# Patient Record
Sex: FEMALE | Race: BLACK OR AFRICAN AMERICAN | Employment: OTHER | URBAN - METROPOLITAN AREA
[De-identification: names, ages, dates, MRNs, and addresses within clinical notes are randomized per-mention and may not be internally consistent; named-entity substitution may affect disease eponyms.]

---

## 2017-06-05 ENCOUNTER — HOSPITAL ENCOUNTER (OUTPATIENT)
Dept: LAB | Age: 77
Discharge: HOME | End: 2017-06-05

## 2017-06-05 ENCOUNTER — OFFICE VISIT (OUTPATIENT)
Dept: FAMILY MEDICINE CLINIC | Age: 77
End: 2017-06-05

## 2017-06-05 VITALS
WEIGHT: 155.8 LBS | OXYGEN SATURATION: 99 % | DIASTOLIC BLOOD PRESSURE: 71 MMHG | SYSTOLIC BLOOD PRESSURE: 143 MMHG | TEMPERATURE: 98.1 F | BODY MASS INDEX: 28.5 KG/M2 | HEART RATE: 73 BPM

## 2017-06-05 DIAGNOSIS — E11.9 TYPE 2 DIABETES MELLITUS WITHOUT COMPLICATION, WITHOUT LONG-TERM CURRENT USE OF INSULIN (HCC): ICD-10-CM

## 2017-06-05 DIAGNOSIS — E03.9 UNSPECIFIED HYPOTHYROIDISM: ICD-10-CM

## 2017-06-05 DIAGNOSIS — Z13.1 SCREENING FOR DIABETES MELLITUS (DM): ICD-10-CM

## 2017-06-05 DIAGNOSIS — I10 ESSENTIAL HYPERTENSION: Primary | ICD-10-CM

## 2017-06-05 DIAGNOSIS — J45.20 MILD INTERMITTENT ASTHMA WITHOUT COMPLICATION: ICD-10-CM

## 2017-06-05 LAB
ALBUMIN SERPL BCP-MCNC: 4.2 G/DL (ref 3.5–5)
ALBUMIN/GLOB SERPL: 1.1 {RATIO} (ref 1.1–2.2)
ALP SERPL-CCNC: 87 U/L (ref 45–117)
ALT SERPL-CCNC: 23 U/L (ref 12–78)
ANION GAP BLD CALC-SCNC: 9 MMOL/L (ref 5–15)
AST SERPL W P-5'-P-CCNC: 21 U/L (ref 15–37)
BILIRUB SERPL-MCNC: 0.6 MG/DL (ref 0.2–1)
BUN SERPL-MCNC: 11 MG/DL (ref 6–20)
BUN/CREAT SERPL: 11 (ref 12–20)
CALCIUM SERPL-MCNC: 10.5 MG/DL (ref 8.5–10.1)
CHLORIDE SERPL-SCNC: 101 MMOL/L (ref 97–108)
CO2 SERPL-SCNC: 32 MMOL/L (ref 21–32)
CREAT SERPL-MCNC: 1.03 MG/DL (ref 0.55–1.02)
ERYTHROCYTE [DISTWIDTH] IN BLOOD BY AUTOMATED COUNT: 14.4 % (ref 11.5–14.5)
EST. AVERAGE GLUCOSE BLD GHB EST-MCNC: 126 MG/DL
GLOBULIN SER CALC-MCNC: 3.7 G/DL (ref 2–4)
GLUCOSE POC: NORMAL MG/DL
GLUCOSE SERPL-MCNC: 58 MG/DL (ref 65–100)
HBA1C MFR BLD: 6 % (ref 4.2–6.3)
HCT VFR BLD AUTO: 41.5 % (ref 35–47)
HGB BLD-MCNC: 14.3 G/DL (ref 11.5–16)
MCH RBC QN AUTO: 28.7 PG (ref 26–34)
MCHC RBC AUTO-ENTMCNC: 34.5 G/DL (ref 30–36.5)
MCV RBC AUTO: 83.3 FL (ref 80–99)
PLATELET # BLD AUTO: 371 K/UL (ref 150–400)
POTASSIUM SERPL-SCNC: 4 MMOL/L (ref 3.5–5.1)
PROT SERPL-MCNC: 7.9 G/DL (ref 6.4–8.2)
RBC # BLD AUTO: 4.98 M/UL (ref 3.8–5.2)
SODIUM SERPL-SCNC: 142 MMOL/L (ref 136–145)
TSH SERPL DL<=0.05 MIU/L-ACNC: 0.12 UIU/ML (ref 0.36–3.74)
WBC # BLD AUTO: 7.3 K/UL (ref 3.6–11)

## 2017-06-05 PROCEDURE — 80053 COMPREHEN METABOLIC PANEL: CPT | Performed by: NURSE PRACTITIONER

## 2017-06-05 PROCEDURE — 85027 COMPLETE CBC AUTOMATED: CPT | Performed by: NURSE PRACTITIONER

## 2017-06-05 PROCEDURE — 83036 HEMOGLOBIN GLYCOSYLATED A1C: CPT | Performed by: NURSE PRACTITIONER

## 2017-06-05 PROCEDURE — 84443 ASSAY THYROID STIM HORMONE: CPT | Performed by: NURSE PRACTITIONER

## 2017-06-05 RX ORDER — HYDROCHLOROTHIAZIDE 12.5 MG/1
12.5 TABLET ORAL DAILY
Qty: 90 TAB | Refills: 1 | Status: SHIPPED | OUTPATIENT
Start: 2017-06-05 | End: 2017-11-10 | Stop reason: SDUPTHER

## 2017-06-05 RX ORDER — LEVOTHYROXINE SODIUM 88 UG/1
88 TABLET ORAL DAILY
Qty: 30 TAB | Refills: 5 | Status: SHIPPED | OUTPATIENT
Start: 2017-06-05 | End: 2017-06-29 | Stop reason: SDUPTHER

## 2017-06-05 RX ORDER — METFORMIN HYDROCHLORIDE 1000 MG/1
1000 TABLET ORAL 2 TIMES DAILY WITH MEALS
Qty: 180 TAB | Refills: 1 | Status: SHIPPED | OUTPATIENT
Start: 2017-06-05 | End: 2017-11-10 | Stop reason: SDUPTHER

## 2017-06-05 RX ORDER — VALSARTAN 160 MG/1
160 TABLET ORAL DAILY
Qty: 90 TAB | Refills: 1 | Status: SHIPPED | OUTPATIENT
Start: 2017-06-05 | End: 2017-11-10 | Stop reason: SDUPTHER

## 2017-06-05 RX ORDER — AMLODIPINE BESYLATE 10 MG/1
10 TABLET ORAL DAILY
Qty: 90 TAB | Refills: 1 | Status: SHIPPED | OUTPATIENT
Start: 2017-06-05 | End: 2018-01-26 | Stop reason: SDUPTHER

## 2017-06-05 NOTE — PROGRESS NOTES
Subjective:     Chief Complaint   Patient presents with    Joint Pain    Medication Refill        She  is a 67 y.o. female who presents for evaluation of chronic cough and asthma exacerbation. Was evaluated in ED in [facility] and given neb Tx and new Rx for allergy medication. Returned from [place] one week ago and notes her cough has been productive  For the last 3-4 weeks. Was previously dry. C/o of widespread joint pains s/p fall approx 13 years ago. Has widespread knee, ankle and hip pains. Was previously on Exforge HCTZ 160/10/12. 5. Notes infrequent albuterol use, last given in [facility] clinic/ED otherwise MDI was last used 3-4 months ago. Overall feels like cough is getting better. Took malaria prophylaxis while in [place]. No widespread chills/flu/fever S&S. Does not routinely check sugars, has been on Metformin dose x 11 years. Needs refills of her current HTN, DM and thyroid Rx.       ROS  Gen - no fever/chills  Resp - no dyspnea or cough  CV - no chest pain or GILBERT  Rest per HPI    Past Medical History:   Diagnosis Date    Asthma     CAD (coronary artery disease)     high cholestrol    Diabetes ([facility] [address]     Diabetes (HCC)     NIDDM    Hypertension     Other ill-defined conditions     hypothyroidism    Other ill-defined conditions     increased cholesterol    Thyroid disease      Past Surgical History:   Procedure Laterality Date    HX GYN      2 c-sections    HX GYN       x 2    HX OTHER SURGICAL      thyroid removed    HX OTHER SURGICAL      thyroidectomy - partial    [place] EGD INTRMURAL NEEDLE ASPIR/BIOP ALTERED ANATOMY  10/31/2011          Current Outpatient Prescriptions on File Prior to Visit   Medication Sig Dispense Refill    metFORMIN (GLUCOPHAGE) 1,000 mg tablet Take 1,000 mg by mouth two (2) times a day.  rosuvastatin (CRESTOR) 10 mg tablet Take 10 mg by mouth nightly.       HYDROcodone-acetaminophen (NORCO) 5-325 mg per tablet Take 1 Tab by mouth every four (4) hours as needed for Pain. Max Daily Amount: 6 Tabs. 20 Tab 0    amlodipine (NORVASC) 10 mg tablet Take 10 mg by mouth daily.  candesartan-hydrochlorothiazide (ATACAND HCT) 16-12.5 mg per tablet Take 1 Tab by mouth daily.  metformin (GLUCOPHAGE) 1,000 mg tablet Take 1,000 mg by mouth two (2) times daily (with meals).  OTHER Gliclazide per pt for diabetes. Takes 60 mg before breakfast. Will bring medication in to verify. Medication from home of [place].  rosuvastatin (CRESTOR) 10 mg tablet Take 10 mg by mouth daily.  levothyroxine (SYNTHROID) 100 mcg tablet Take 100 mcg by mouth daily.  MULTIVITAMIN W-MINERALS/LUTEIN (CENTRUM SILVER PO) Take  by mouth. Takes one po daily       OTHER Salamol CFC free inhaler as needed. Medication from hometown of [facility].  ALBUTEROL SULFATE (VENTOLIN PO) Take  by mouth as needed.  rosuvastatin (CRESTOR) 5 mg tablet Take 10 mg by mouth daily.  candesartan-hydrochlorothiazide (ATACAND HCT) 16-12.5 mg per tablet Take 1 Tab by mouth daily.  amlodipine (NORVASC) 10 mg tablet Take 10 mg by mouth daily.  metformin (GLUCOPHAGE) 1,000 mg tablet Take 1,000 mg by mouth two (2) times a day. No current facility-administered medications on file prior to visit.          Objective:     Vitals:    06/05/17 1005   BP: 143/71   Pulse: 73   Temp: 98.1 °F (36.7 °C)   TempSrc: Oral   SpO2: 99%   Weight: 155 lb 12.8 oz (70.7 kg)       Physical Examination:  General appearance - alert, well appearing, and in no distress  Eyes -sclera anicteric  Neck - supple, no significant adenopathy, no thyromegaly  Chest - clear to auscultation, no wheezes, rales or rhonchi, symmetric air entry  Heart - normal rate, regular rhythm, normal S1, S2, no murmurs, rubs, clicks or gallops  Neurological - alert, oriented, no focal findings or movement disorder noted  Abdomen-BS present/WNL x 4 quads, non-tender/distended, soft,no organomegaly      Recent Results (from the past 12 hour(s))   AMB POC GLUCOSE BLOOD, BY GLUCOSE MONITORING DEVICE    Collection Time: 06/05/17 10:10 AM   Result Value Ref Range    Glucose POC 93 Non-fasting mg/dL       Assessment/ Plan:   Adis Chavez was seen today for joint pain and medication refill. Diagnoses and all orders for this visit:    Essential hypertension  -     valsartan (DIOVAN) 160 mg tablet; Take 1 Tab by mouth daily. -     hydroCHLOROthiazide (HYDRODIURIL) 12.5 mg tablet; Take 1 Tab by mouth daily. -     amLODIPine (NORVASC) 10 mg tablet; Take 1 Tab by mouth daily. Screening for diabetes mellitus (DM)  -     AMB POC GLUCOSE BLOOD, BY GLUCOSE MONITORING DEVICE    Mild intermittent asthma without complication  -     METABOLIC PANEL, COMPREHENSIVE; Future  -     CBC W/O DIFF; Future  -     XR CHEST PA LAT; Future    Unspecified hypothyroidism  -     TSH 3RD GENERATION; Future  -     levothyroxine (SYNTHROID) 88 mcg tablet; Take 1 Tab by mouth daily. Type 2 diabetes mellitus without complication, without long-term current use of insulin (HCC)  -     HEMOGLOBIN A1C WITH EAG; Future  -     METABOLIC PANEL, COMPREHENSIVE; Future  -     metFORMIN (GLUCOPHAGE) 1,000 mg tablet; Take 1 Tab by mouth two (2) times daily (with meals). Glucose well controlled and Pt was not observed coughing nor w/ SOB. Lungs CTA. Discussed getting CXR in 2-3 weeks if she is not still feeling improvement. Check labs. RTC in 6-8 weeks for f/u, dose adjustment of Rx. Change POC based on labs. Tylenol PRN for pains/aches. I have discussed the diagnosis with the patient and the intended plan as seen in the above orders. The patient has received an after-visit summary and questions were answered concerning future plans. I have discussed medication side effects and warnings with the patient as well. The patient verbalizes understanding and agreement with the plan.     Follow-up Disposition: Not on File

## 2017-06-05 NOTE — MR AVS SNAPSHOT
Visit Information Date & Time Provider Department Dept. Phone Encounter #  
 6/5/2017  9:45 AM Harmeet García NP [facility] [address] [facility] [address] [id number] Follow-up Instructions Return in about 6 weeks (around 7/17/2017). Upcoming Health Maintenance Date Due  
 FOOT EXAM Q1 9/4/1950 MICROALBUMIN Q1 9[birth year]4/1950 EYE EXAM RETINAL OR DILATED Q1 9/4/1949 DTaP/Tdap/Td series (1 - Tdap) 9/4/1960 ZOSTER VACCINE AGE 60> 9/4/1999 GLAUCOMA SCREENING Q2Y 9/4/2004 OSTEOPOROSIS SCREENING (DEXA) 9/4/2004 Pneumococcal 65+ Low/Medium Risk (1 of 2 - PCV13) 9/4/2004 MEDICARE YEARLY EXAM 9/4/2004 HEMOGLOBIN A1C Q6M 4/29/2012 LIPID PANEL Q1 10/29/2012 INFLUENZA AGE 9 TO ADULT 8/1/2017 Allergies as of 6/5/2017  Review Complete On: 6/5/2017 By: Harmeet García NP Severity Noted Reaction Type Reactions Aspirin  10/28/2011    Other (comments) Pt states she is asthmatic and \"knows not to take ASA\" Aspirin  11/04/2011    Unknown (comments) Unsure of reaction Shellfish Containing Products  11/04/2011    Other (comments) Asthma attack Shellfish Derived  04/25/2016    Hives Current Immunizations  Never Reviewed No immunizations on file. Not reviewed this visit You Were Diagnosed With   
  
 Codes Comments Screening for diabetes mellitus (DM)    -  Primary ICD-10-CM: Z13.1 ICD-9-CM: V77.1 Essential hypertension     ICD-10-CM: I10 
ICD-9-CM: 401.9 Mild intermittent asthma without complication     OAE-97-WK: J45.20 ICD-9-CM: 493.90 Unspecified hypothyroidism     ICD-10-CM: E03.9 ICD-9-CM: 900. 9 Type 2 diabetes mellitus without complication, without long-term current use of insulin (HCC)     ICD-10-CM: E11.9 ICD-9-CM: 250.00 Vitals  BP Pulse Temp Weight(growth percentile) SpO2 BMI  
 143/71 (BP 1 Location: Left arm, BP Patient Position: Sitting) 73 98.1 °F (36.7 °C) (Oral) 155 lb 12.8 oz (70.7 kg) 99% 28.5 kg/m2 OB Status Smoking Status Postmenopausal Never Smoker Vitals History BMI and BSA Data Body Mass Index Body Surface Area 28.5 kg/m 2 1.76 m 2 Preferred Pharmacy Pharmacy Name Phone [facility] [address] [place] [address], [address] [place] [address] [phone] Your Updated Medication List  
  
   
This list is accurate as of: 6/5/17 11:19 AM.  Always use your most recent med list. amLODIPine 10 mg tablet Commonly known as:  Delphina Peat Take 1 Tab by mouth daily. CENTRUM SILVER PO Take  by mouth. Takes one po daily  
  
 hydroCHLOROthiazide 12.5 mg tablet Commonly known as:  HYDRODIURIL Take 1 Tab by mouth daily. levothyroxine 88 mcg tablet Commonly known as:  SYNTHROID Take 1 Tab by mouth daily. metFORMIN 1,000 mg tablet Commonly known as:  GLUCOPHAGE Take 1 Tab by mouth two (2) times daily (with meals). OTHER Salamol CFC free inhaler as needed. Medication from [facility]. valsartan 160 mg tablet Commonly known as:  DIOVAN Take 1 Tab by mouth daily. VENTOLIN PO Take  by mouth as needed. Prescriptions Sent to Pharmacy Refills  
 metFORMIN (GLUCOPHAGE) 1,000 mg tablet 1 Sig: Take 1 Tab by mouth two (2) times daily (with meals). Class: Normal  
 Pharmacy: [address] Ph #: [phone] Route: Oral  
 levothyroxine (SYNTHROID) 88 mcg tablet 5 Sig: Take 1 Tab by mouth daily. Class: Normal  
 Pharmacy: [address] Ph #: [phone] Route: Oral  
 valsartan (DIOVAN) 160 mg tablet 1 Sig: Take 1 Tab by mouth daily. Class: Normal  
 Pharmacy: [address] Ph #: [phone]  Route: Oral  
 hydroCHLOROthiazide (HYDRODIURIL) 12.5 mg tablet 1  
 Sig: Take 1 Tab by mouth daily. Class: Normal  
 Pharmacy: [facility], [address] [place] [address] Ph #: [phone] Route: Oral  
 amLODIPine (NORVASC) 10 mg tablet 1 Sig: Take 1 Tab by mouth daily. Class: Normal  
 Pharmacy: [facility], [address] [place] [address] Ph #: [phone] Route: Oral  
  
We Performed the Following AMB POC GLUCOSE BLOOD, BY GLUCOSE MONITORING DEVICE [65495 CPT(R)] Follow-up Instructions Return in about 6 weeks (around 7/17/2017). To-Do List   
 06/05/2017 Lab:  CBC W/O DIFF   
  
 06/05/2017 Lab:  HEMOGLOBIN A1C WITH EAG   
  
 06/05/2017 Lab:  METABOLIC PANEL, COMPREHENSIVE   
  
 06/05/2017 Lab:  TSH 3RD GENERATION   
  
 06/08/2017 Imaging:  XR CHEST PA LAT Patient Instructions Learning About Asthma Triggers What are triggers? When you have asthma, certain things can make your symptoms worse. These are called triggers. They include: · Cigarette smoke or air pollution. · Things you are allergic to, such as: 
¨ Pollen, mold, or dust mites. ¨ Pet hair, skin, or saliva. · Illnesses, like colds, flu, or pneumonia. · Exercise. · Dry, cold air. How do triggers affect asthma? Triggers can make it harder for your lungs to work as they should and can lead to sudden difficulty breathing and other symptoms. When you are around a trigger, an asthma attack is more likely. If your symptoms are severe, you may need emergency treatment or have to go to the hospital for treatment. If you know what your triggers are and can avoid them, you may be able to prevent asthma attacks, reduce how often you have them, and make them less severe. What can you do to avoid triggers? The first thing is to know your triggers. When you are having symptoms, note the things around you that might be causing them.  Then look for patterns in what may be triggering your symptoms. Record your triggers on a piece of paper or in an asthma diary. When you have your list of possible triggers, work with your doctor to find ways to avoid them. You also can check how well your lungs are working by measuring your peak expiratory flow (PEF) throughout the day. Your PEF may drop when you are near things that trigger symptoms. Here are some ways to avoid a few common triggers. · Do not smoke or allow others to smoke around you. If you need help quitting, talk to your doctor about stop-smoking programs and medicines. These can increase your chances of quitting for good. · If there is a lot of pollution, pollen, or dust outside, stay at home and keep your windows closed. Use an air conditioner or air filter in your home. Check your local weather report or newspaper for air quality and pollen reports. · Get a flu shot every year. Talk to your doctor about getting a pneumococcal shot. Wash your hands often to prevent infections. · Avoid exercising outdoors in cold weather. If you are outdoors in cold weather, wear a scarf around your face and breathe through your nose. How can you manage an asthma attack? · If you have an asthma action plan, follow the plan. In general: ¨ Use your quick-relief inhaler as directed by your doctor. If your symptoms do not get better after you use your medicine, have someone take you to the emergency room. Call an ambulance if needed. ¨ If your doctor has given you other inhaled medicines or steroid pills, take them as directed. Where can you learn more? Go to [employer].be Enter J811 in the search box to learn more about \"Learning About Asthma Triggers. \"  
© 9136-0740 Healthwise, Incorporated. Care instructions adapted under license by [employer] (which disclaims liability or warranty for this information).  This care instruction is for use with your licensed healthcare professional. If you have questions about a medical condition or this instruction, always ask your healthcare professional. [address] any warranty or liability for your use of this information. Content Version: 01.5.224566; Last Revised: February 23, 2012 Introducing [facility] SERVICES! [employer] introduces TradeBlock patient portal. Now you can access parts of your medical record, email your doctor's office, and request medication refills online. 1. In your internet browser, go to [email or web address] 2. Click on the First Time User? Click Here link in the Sign In box. You will see the New Member Sign Up page. 3. Enter your TradeBlock Access Code exactly as it appears below. You will not need to use this code after youve completed the sign-up process. If you do not sign up before the expiration date, you must request a new code. · TradeBlock Access Code: [email or web address] Expires: 9/3/2017 11:19 AM 
 
4. Enter the last four digits of your Social Security Number (xxxx) and Date of Birth (mm/dd/yyyy) as indicated and click Submit. You will be taken to the next sign-up page. 5. Create a TradeBlock ID. This will be your TradeBlock login ID and cannot be changed, so think of one that is secure and easy to remember. 6. Create a TradeBlock password. You can change your password at any time. 7. Enter your Password Reset Question and Answer. This can be used at a later time if you forget your password. 8. Enter your e-mail address. You will receive e-mail notification when new information is available in [address] 9. Click Sign Up. You can now view and download portions of your medical record. 10. Click the Download Summary menu link to download a portable copy of your medical information. If you have questions, please visit the Frequently Asked Questions section of the TradeBlock website.  Remember, TradeBlock is NOT to be used for urgent needs. For medical emergencies, dial 911. Now available from your iPhone and Android! Please provide this summary of care documentation to your next provider. Your primary care clinician is listed as NONE. If you have any questions after today's visit, please call [phone].

## 2017-06-05 NOTE — PROGRESS NOTES
AVS printed and reviewed. Discussed CXR if needed and reviewed [address] pt registration/testing as a walk in client Mon-Fri 8a to5p. Discussed billing w/ hospital tests and encouraged applying for financial assistance for hospital bills . Pt shows  Care Card and encouraged to apply again once a bill is generated. Good Rx coupon given and discussed and Abdi's reviewed. Instructed to schedule f/u appt at registration prior to leaving today.

## 2017-06-05 NOTE — PROGRESS NOTES
Patient was an extremely difficult blood draw as veins were not easy to find at all. I sent what I could draw.

## 2017-06-05 NOTE — PROGRESS NOTES
Results for orders placed or performed in visit on 06/05/17   AMB POC GLUCOSE BLOOD, BY GLUCOSE MONITORING DEVICE   Result Value Ref Range    Glucose POC 93 Non-fasting mg/dL

## 2017-06-05 NOTE — PATIENT INSTRUCTIONS
Learning About Asthma Triggers  What are triggers? When you have asthma, certain things can make your symptoms worse. These are called triggers. They include:  · Cigarette smoke or air pollution. · Things you are allergic to, such as:  ¨ Pollen, mold, or dust mites. ¨ Pet hair, skin, or saliva. · Illnesses, like colds, flu, or pneumonia. · Exercise. · Dry, cold air. How do triggers affect asthma? Triggers can make it harder for your lungs to work as they should and can lead to sudden difficulty breathing and other symptoms. When you are around a trigger, an asthma attack is more likely. If your symptoms are severe, you may need emergency treatment or have to go to the hospital for treatment. If you know what your triggers are and can avoid them, you may be able to prevent asthma attacks, reduce how often you have them, and make them less severe. What can you do to avoid triggers? The first thing is to know your triggers. When you are having symptoms, note the things around you that might be causing them. Then look for patterns in what may be triggering your symptoms. Record your triggers on a piece of paper or in an asthma diary. When you have your list of possible triggers, work with your doctor to find ways to avoid them. You also can check how well your lungs are working by measuring your peak expiratory flow (PEF) throughout the day. Your PEF may drop when you are near things that trigger symptoms. Here are some ways to avoid a few common triggers. · Do not smoke or allow others to smoke around you. If you need help quitting, talk to your doctor about stop-smoking programs and medicines. These can increase your chances of quitting for good. · If there is a lot of pollution, pollen, or dust outside, stay at home and keep your windows closed. Use an air conditioner or air filter in your home. Check your local weather report or newspaper for air quality and pollen reports.   · Get a flu shot every year. Talk to your doctor about getting a pneumococcal shot. Wash your hands often to prevent infections. · Avoid exercising outdoors in cold weather. If you are outdoors in cold weather, wear a scarf around your face and breathe through your nose. How can you manage an asthma attack? · If you have an asthma action plan, follow the plan. In general:  ¨ Use your quick-relief inhaler as directed by your doctor. If your symptoms do not get better after you use your medicine, have someone take you to the emergency room. Call an ambulance if needed. ¨ If your doctor has given you other inhaled medicines or steroid pills, take them as directed. Where can you learn more? Go to [employer].be  Enter M564 in the search box to learn more about \"Learning About Asthma Triggers. \"   © 8908-6333 Healthwise, Incorporated. Care instructions adapted under license by [employer] (which disclaims liability or warranty for this information). This care instruction is for use with your licensed healthcare professional. If you have questions about a medical condition or this instruction, always ask your healthcare professional. [place] [address] any warranty or liability for your use of this information. Content Version: 57.7.490703;  Last Revised: February 23, 2012

## 2017-06-28 ENCOUNTER — OFFICE VISIT (OUTPATIENT)
Dept: FAMILY MEDICINE CLINIC | Age: 77
End: 2017-06-28

## 2017-06-28 ENCOUNTER — HOSPITAL ENCOUNTER (OUTPATIENT)
Dept: LAB | Age: 77
Discharge: HOME | End: 2017-06-28

## 2017-06-28 VITALS
WEIGHT: 157 LBS | SYSTOLIC BLOOD PRESSURE: 147 MMHG | BODY MASS INDEX: 31.65 KG/M2 | OXYGEN SATURATION: 97 % | TEMPERATURE: 98.1 F | HEIGHT: 59 IN | DIASTOLIC BLOOD PRESSURE: 70 MMHG | HEART RATE: 93 BPM

## 2017-06-28 DIAGNOSIS — E03.9 UNSPECIFIED HYPOTHYROIDISM: ICD-10-CM

## 2017-06-28 DIAGNOSIS — R09.89 PHLEGM IN THROAT: Primary | ICD-10-CM

## 2017-06-28 DIAGNOSIS — M25.551 BILATERAL HIP PAIN: ICD-10-CM

## 2017-06-28 DIAGNOSIS — Z71.89 COUNSELING AND COORDINATION OF CARE: Primary | ICD-10-CM

## 2017-06-28 DIAGNOSIS — M25.552 BILATERAL HIP PAIN: ICD-10-CM

## 2017-06-28 PROCEDURE — 84443 ASSAY THYROID STIM HORMONE: CPT | Performed by: NURSE PRACTITIONER

## 2017-06-28 RX ORDER — LORATADINE 10 MG/1
10 TABLET ORAL DAILY
Qty: 30 TAB | Refills: 3 | Status: SHIPPED | OUTPATIENT
Start: 2017-06-28 | End: 2018-11-30 | Stop reason: SDUPTHER

## 2017-06-28 NOTE — PROGRESS NOTES
Patient seen for discharge. We reviewed the AVS, prescription and pharmacy location. The patient understands to try Tylenol BID for a month, and if her hip pain is not improved, then to have an xray done. The provider will enter the xray order. She was given the resource sheet for locations of [address] and states that her Care Card  last month. She has the phone number to call to try to renew it and also understands that she can talk with a CAV  if she has difficulty with the Care Card program or medical bills. The patient has a follow-up appointment scheduled for 17.  Ashlee Navarro RN

## 2017-06-28 NOTE — PROGRESS NOTES
Assessment/Plan:       ICD-10-CM ICD-9-CM    1. Phlegm in throat R09.89 786.4 loratadine (CLARITIN) 10 mg tablet   2. Unspecified hypothyroidism E03.9 244.9 TSH 3RD GENERATION   3. Bilateral hip pain M25.551 719.45 XR HIP LT W OR WO PELV 2-3 VWS    M25.552  XR HIP RT W OR WO PELV 2-3 VWS       [facility]  Subjective:     Chief Complaint   Patient presents with    Hip Pain     bilateral leg pain    LOW BACK PAIN    Tristin Benedict is a 67 y.o. [place]  female. Was away Jan through May. Came back in May. Doing this since 2002. HPI: no more cough. Still has phlegm in throat. [place], will start taking one from [address] in July. Glucose 94, fasting. For 2 weeks has pain with standing up. Previously has had pain with the right knee, now it is pain, and on both sides. Nothing tried. Problem is not able to  place. 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 Has had thyroidectomy and 2 c-sections  She  has a past medical history of Asthma; CAD (coronary artery disease); Diabetes ([address] Diabetes ([address] Hypertension; Other ill-defined conditions; Other ill-defined conditions; and Thyroid disease. She also has no past medical history of Unspecified sleep apnea. She has Acute pancreatitis, Hypokalemia, DM (diabetes mellitus) ([address] HTN (hypertension), Unspecified hypothyroidism, Asthma, and Other and unspecified hyperlipidemia on her problem list.   Review of Systems: Negative for: fever, chest pain, shortness of breath, leg swelling, exertional dyspnea, palpitations. She takes Centrum vitamins. Current Medications:   Current Outpatient Prescriptions on File Prior to Visit   Medication Sig    metFORMIN (GLUCOPHAGE) 1,000 mg tablet Take 1 Tab by mouth two (2) times daily (with meals).  levothyroxine (SYNTHROID) 88 mcg tablet Take 1 Tab by mouth daily.  valsartan (DIOVAN) 160 mg tablet Take 1 Tab by mouth daily.     hydroCHLOROthiazide (HYDRODIURIL) 12.5 mg tablet Take 1 Tab by mouth daily.  amLODIPine (NORVASC) 10 mg tablet Take 1 Tab by mouth daily.  MULTIVITAMIN W-MINERALS/LUTEIN (CENTRUM SILVER PO) Take  by mouth. Takes one po daily     OTHER Salamol CFC free inhaler as needed. Medication from [facility].  ALBUTEROL SULFATE (VENTOLIN PO) Take  by mouth as needed. No current facility-administered medications on file prior to visit. Past Surgical History: She  has a past surgical history that includes other surgical; gyn; egd intrmural needle aspir/biop altered anatomy (10/31/2011); other surgical; and gyn. Social and Family History: She  reports that she has never smoked. She has never used smokeless tobacco. She reports that she drinks alcohol. She reports that she does not use illicit drugs. ; family history includes Hypertension in her father. Objective:     Vitals:    06/28/17 0929   BP: 147/70   Pulse: 93   Temp: 98.1 °F (36.7 °C)   TempSrc: Oral   SpO2: 97%   Weight: 157 lb (71.2 kg)   Height: 4' 11.45\" (1.51 m)    No LMP recorded. Patient is postmenopausal.   Wt Readings from Last 2 Encounters:   06/28/17 157 lb (71.2 kg)   06/05/17 155 lb 12.8 oz (70.7 kg)     No results found for any visits on 06/28/17. Physical Examination: mucus streaking of posterior pharynx, poor dentition of molar noted. General appearance - well developed, no acute distress. Chest - clear to auscultation. Heart - regular rate and rhythm without murmurs, rubs, or gallops. Abdomen - bowel sounds present x 4, NT, ND. Extremities - pulses intact. No peripheral edema. Assessment/Plan:   Bridgette Carranza was seen today for hip pain and low back pain. Diagnoses and all orders for this visit:    Phlegm in throat  -     loratadine (CLARITIN) 10 mg tablet; Take 1 Tab by mouth daily. For phlegm in throat    Unspecified hypothyroidism  -     TSH 3RD GENERATION;  Future    Bilateral hip pain  -     XR HIP LT W OR WO PELV 2-3 VWS; Future  - XR HIP RT W OR WO PELV 2-3 VWS; Future    resolves if not standing in one place for too long. Follow-up Disposition:  Return if symptoms worsen or fail to improve. Marylou Mandel Conception, DNP, FNP-BC, BC-ADM  Lory Camp expressed understanding of this plan.

## 2017-06-29 ENCOUNTER — TELEPHONE (OUTPATIENT)
Dept: FAMILY MEDICINE CLINIC | Age: 77
End: 2017-06-29

## 2017-06-29 DIAGNOSIS — E03.9 UNSPECIFIED HYPOTHYROIDISM: ICD-10-CM

## 2017-06-29 LAB — TSH SERPL DL<=0.05 MIU/L-ACNC: 0.05 UIU/ML (ref 0.36–3.74)

## 2017-06-29 RX ORDER — LEVOTHYROXINE SODIUM 75 UG/1
75 TABLET ORAL DAILY
Qty: 30 TAB | Refills: 5 | Status: SHIPPED | OUTPATIENT
Start: 2017-06-29 | End: 2017-09-15 | Stop reason: SDUPTHER

## 2017-06-29 NOTE — TELEPHONE ENCOUNTER
I left a message on the patient's self-identified voice mail that based on her lab results, I have lowered her thyroid medication dose and sent it in to her pharmacy. I provided [phone] [facility] office phone number if questions.   Corrie Smiley DNP, MSN, FNP-BC, BC-ADM

## 2017-07-12 ENCOUNTER — OFFICE VISIT (OUTPATIENT)
Dept: FAMILY MEDICINE CLINIC | Age: 77
End: 2017-07-12

## 2017-07-12 VITALS
BODY MASS INDEX: 32.03 KG/M2 | SYSTOLIC BLOOD PRESSURE: 150 MMHG | HEART RATE: 91 BPM | OXYGEN SATURATION: 97 % | DIASTOLIC BLOOD PRESSURE: 90 MMHG | TEMPERATURE: 98.5 F | WEIGHT: 161 LBS

## 2017-07-12 DIAGNOSIS — E11.9 TYPE 2 DIABETES MELLITUS WITHOUT COMPLICATION, WITHOUT LONG-TERM CURRENT USE OF INSULIN (HCC): Primary | ICD-10-CM

## 2017-07-12 DIAGNOSIS — M25.552 PAIN OF BOTH HIP JOINTS: ICD-10-CM

## 2017-07-12 DIAGNOSIS — M25.551 PAIN OF BOTH HIP JOINTS: ICD-10-CM

## 2017-07-12 LAB — GLUCOSE POC: NORMAL MG/DL

## 2017-07-12 RX ORDER — ACETAMINOPHEN 500 MG
1000 TABLET ORAL
Qty: 100 TAB | Refills: 2 | COMMUNITY
Start: 2017-07-12 | End: 2017-09-13

## 2017-07-12 NOTE — PROGRESS NOTES
Coordination of Care  1. Have you been to the ER, urgent care clinic since your last visit? Hospitalized since your last visit? No    2. Have you seen or consulted any other health care providers outside of the [address] [place] [address] since your last visit? Include any pap smears or colon screening.  No    Medications  Medication Reconciliation Performed: yes  Patient does not know need refills     Learning Assessment Complete? yes  Results for orders placed or performed in visit on 07/12/17   AMB POC GLUCOSE BLOOD, BY GLUCOSE MONITORING DEVICE   Result Value Ref Range    Glucose POC 70 nonfasting mg/dL

## 2017-07-12 NOTE — PATIENT INSTRUCTIONS
Hip Pain: Care Instructions  Your Care Instructions  Hip pain may be caused by many things, including overuse, a fall, or a twisting movement. Another cause of hip pain is arthritis. Your pain may increase when you stand up, walk, or squat. The pain may come and go or may be constant. Home treatment can help relieve hip pain, swelling, and stiffness. If your pain is ongoing, you may need more tests and treatment. Follow-up care is a key part of your treatment and safety. Be sure to make and go to all appointments, and call your doctor if you are having problems. Its also a good idea to know your test results and keep a list of the medicines you take. How can you care for yourself at home? · Take pain medicines exactly as directed. ¨ If the doctor gave you a prescription medicine for pain, take it as prescribed. ¨ If you are not taking a prescription pain medicine, ask your doctor if you can take an over-the-counter medicine. · Rest and protect your hip. Take a break from any activity, including standing or walking, that may cause pain. · Put ice or a cold pack against your hip for 10 to 20 minutes at a time. Try to do this every 1 to 2 hours for the next 3 days (when you are awake) or until the swelling goes down. Put a thin cloth between the ice and your skin. · Sleep on your healthy side with a pillow between your knees, or sleep on your back with pillows under your knees. · If there is no swelling, you can put moist heat, a heating pad, or a warm cloth on your hip. Do gentle stretching exercises to help keep your hip flexible. · Learn how to prevent falls. Have your vision and hearing checked regularly. Wear slippers or shoes with a nonskid sole. · Stay at a healthy weight. · Wear comfortable shoes. When should you call for help? Call 911 anytime you think you may need emergency care. For example, call if:  · You have sudden chest pain and shortness of breath, or you cough up blood.   · You are not able to stand or walk or bear weight. · Your buttocks, legs, or feet feel numb or tingly. · Your leg or foot is cool or pale or changes color. · You have severe pain. Call your doctor now or seek immediate medical care if:  · You have signs of infection, such as:  ¨ Increased pain, swelling, warmth, or redness in the hip area. ¨ Red streaks leading from the hip area. ¨ Pus draining from the hip area. ¨ A fever. · You have signs of a blood clot, such as:  ¨ Pain in your calf, back of the knee, thigh, or groin. ¨ Redness and swelling in your leg or groin. · You are not able to bend, straighten, or move your leg normally. · You have trouble urinating or having bowel movements. Watch closely for changes in your health, and be sure to contact your doctor if:  · You do not get better as expected. Where can you learn more? Go to http://harmony-young.info/. Enter C592 in the search box to learn more about \"Hip Pain: Care Instructions. \"  Current as of: March 20, 2017  Content Version: 11.3  © 8254-2780 [employer]. Care instructions adapted under license by [employer] (which disclaims liability or warranty for this information). If you have questions about a medical condition or this instruction, always ask your healthcare professional. [place] [address] any warranty or liability for your use of this information.

## 2017-07-12 NOTE — PROGRESS NOTES
Avs discussed with Tomasa Howard by Discharge Nurse Alycia Baum LPN. Dicussed medications prescribed today. Info given to locations for xrays.    AVS printed and given to patient Alycia Baum LPN

## 2017-07-12 NOTE — PROGRESS NOTES
Subjective:     Chief Complaint   Patient presents with    Back Pain     back pain,  pain radiates to her legs, pain is worse in right leg   X about 2 weeks        She  is a 67 y.o. female who presents for evaluation of  Bilateral knee and hip pain. No recent fall or injury prior to change in pain. Poor relief from tylenol 325mg. Has not increased the dose. Exacerbated by activity/standing, relieved with rest.     Pt did not go for hip xrays ordered from [address] [place] [address] on . Worse in R shoulder and R hip than L side. No other attempted remedies. Is taking prior HTN Rx (Exforge) and started new dose of synthroid. ROS  Gen - no fever/chills  Resp - no dyspnea or cough  CV - no chest pain or GILBERT  Rest per HPI    Past Medical History:   Diagnosis Date    Asthma     CAD (coronary artery disease)     high cholestrol    Diabetes ([address]     Diabetes (HCC)     NIDDM    Hypertension     Other ill-defined conditions     hypothyroidism    Other ill-defined conditions     increased cholesterol    Thyroid disease      Past Surgical History:   Procedure Laterality Date    HX GYN      2 c-sections    HX GYN       x 2    HX OTHER SURGICAL      thyroid removed    HX OTHER SURGICAL      thyroidectomy - partial    [place] EGD INTRMURAL NEEDLE ASPIR/BIOP ALTERED ANATOMY  10/31/2011          Current Outpatient Prescriptions on File Prior to Visit   Medication Sig Dispense Refill    levothyroxine (SYNTHROID) 75 mcg tablet Take 1 Tab by mouth daily. 30 Tab 5    loratadine (CLARITIN) 10 mg tablet Take 1 Tab by mouth daily. For phlegm in throat 30 Tab 3    metFORMIN (GLUCOPHAGE) 1,000 mg tablet Take 1 Tab by mouth two (2) times daily (with meals). 180 Tab 1    valsartan (DIOVAN) 160 mg tablet Take 1 Tab by mouth daily. 90 Tab 1    hydroCHLOROthiazide (HYDRODIURIL) 12.5 mg tablet Take 1 Tab by mouth daily. 90 Tab 1    amLODIPine (NORVASC) 10 mg tablet Take 1 Tab by mouth daily.  90 Tab 1    MULTIVITAMIN W-MINERALS/LUTEIN (CENTRUM SILVER PO) Take  by mouth. Takes one po daily       OTHER Salamol CFC free inhaler as needed. Medication from [facility].  ALBUTEROL SULFATE (VENTOLIN PO) Take  by mouth as needed. No current facility-administered medications on file prior to visit. Objective:     Vitals:    07/12/17 0951 07/12/17 0954 07/12/17 1019   BP: 168/74 178/81 150/90   Pulse: 91 91    Temp: 98.5 °F (36.9 °C)     TempSrc: Oral     SpO2: 97%     Weight: 161 lb (73 kg)         Physical Examination:  General appearance - alert, well appearing, and in no distress  Eyes -sclera anicteric  Neck - supple, no significant adenopathy, no thyromegaly  Chest - clear to auscultation, no wheezes, rales or rhonchi, symmetric air entry  Heart - normal rate, regular rhythm, normal S1, S2, no murmurs, rubs, clicks or gallops  Neurological - alert, oriented, no focal findings or movement disorder noted    Recent Results (from the past 12 hour(s))   AMB POC GLUCOSE BLOOD, BY GLUCOSE MONITORING DEVICE    Collection Time: 07/12/17 10:01 AM   Result Value Ref Range    Glucose POC 70 nonfasting mg/dL       Assessment/ Plan:   Graciela Gill was seen today for back pain. Diagnoses and all orders for this visit:    Type 2 diabetes mellitus without complication, without long-term current use of insulin ([facility])  -     AMB POC GLUCOSE BLOOD, BY GLUCOSE MONITORING DEVICE    Pain of both hip joints     ? Etiology of Pt's pain. Recommend Pt go for bilateral hip x-rays ASAP and discuss results at f/u in 2-3 weeks. Increase tylenol to 500mg TID and if no relief, max 1000mg TID. Allergic to ASA avoid NSAIDs. I have discussed the diagnosis with the patient and the intended plan as seen in the above orders. The patient has received an after-visit summary and questions were answered concerning future plans. I have discussed medication side effects and warnings with the patient as well.   The patient verbalizes understanding and agreement with the plan. Follow-up Disposition:  Return in about 2 weeks (around 7/26/2017).

## 2017-07-14 ENCOUNTER — HOSPITAL ENCOUNTER (OUTPATIENT)
Dept: GENERAL RADIOLOGY | Age: 77
Discharge: HOME | End: 2017-07-14
Payer: SUBSIDIZED

## 2017-07-14 DIAGNOSIS — M25.551 BILATERAL HIP PAIN: ICD-10-CM

## 2017-07-14 DIAGNOSIS — M25.552 BILATERAL HIP PAIN: ICD-10-CM

## 2017-07-14 PROCEDURE — 73521 X-RAY EXAM HIPS BI 2 VIEWS: CPT

## 2017-07-15 NOTE — PROGRESS NOTES
If patient is still having the hip pain, she needs to return for further evaluation. The x-ray did not show any broken bones.

## 2017-07-15 NOTE — PROGRESS NOTES
Dr. Vasyl Soares, in this 67year old female, is there concern for cancer with a calcified fibroid? If patient returns: review signs/symptoms. Focus PE on GYN evaluation of the fibroid and additional recommendations per Dr. Vasyl Soares.

## 2017-07-17 NOTE — PROGRESS NOTES
No, calcified usually indicates it hasn't changed in a long time and is sometimes seen in older women.

## 2017-07-17 NOTE — PROGRESS NOTES
Telephone call made to the patient to review the provider's result note. The patient states she is still having hip pain and would like to reschedule her 07-27-17 appointment because her children are not able to drive her that day. There are no open appointments anytime soon, so she plans to make the line this week because she wants to be seen again as soon as possible. Per her request, I canceled her 07-27-17 provider appointment at [facility].  Elizabeth Rudolph RN

## 2017-07-21 ENCOUNTER — OFFICE VISIT (OUTPATIENT)
Dept: FAMILY MEDICINE CLINIC | Age: 77
End: 2017-07-21

## 2017-07-21 VITALS
WEIGHT: 160 LBS | DIASTOLIC BLOOD PRESSURE: 83 MMHG | BODY MASS INDEX: 31.83 KG/M2 | TEMPERATURE: 98.7 F | SYSTOLIC BLOOD PRESSURE: 175 MMHG | HEART RATE: 94 BPM

## 2017-07-21 DIAGNOSIS — Z71.89 COUNSELING AND COORDINATION OF CARE: Primary | ICD-10-CM

## 2017-07-21 DIAGNOSIS — I10 ESSENTIAL HYPERTENSION: Primary | ICD-10-CM

## 2017-07-21 DIAGNOSIS — M16.0 PRIMARY OSTEOARTHRITIS OF BOTH HIPS: ICD-10-CM

## 2017-07-21 NOTE — PROGRESS NOTES
Met with Yannick Ng and completed [facility] application pending income verification.   Federica Naik

## 2017-07-21 NOTE — PROGRESS NOTES
Subjective:     Chief Complaint   Patient presents with    Other     Lab result        She  is a 67 y.o. female who presents for evaluation of continued bilateral hip pain. Has stopped taking Methyldopa from her [place] but is still taking Ex-forge. Will run out mid Aug.     Started taking new synthroid dose approx 2 weeks ago. Does not notice any symptoms with thyroid dose change. Notes improvement in taking 1g Tylenol BID but pain is still present, worse with standing. Has trouble standing in [employer] r/t pain. Went for x-rays earlier last week. ROS  Gen - no fever/chills  Resp - no dyspnea or cough  CV - no chest pain or GILBERT  Rest per HPI    Past Medical History:   Diagnosis Date    Asthma     CAD (coronary artery disease)     high cholestrol    Diabetes ([address]     Diabetes (HCC)     NIDDM    Hypertension     Other ill-defined conditions     hypothyroidism    Other ill-defined conditions     increased cholesterol    Thyroid disease      Past Surgical History:   Procedure Laterality Date    HX GYN      2 c-sections    HX GYN       x 2    HX OTHER SURGICAL      thyroid removed    HX OTHER SURGICAL      thyroidectomy - partial    [place] EGD INTRMURAL NEEDLE ASPIR/BIOP ALTERED ANATOMY  10/31/2011          Current Outpatient Prescriptions on File Prior to Visit   Medication Sig Dispense Refill    acetaminophen (TYLENOL) 500 mg tablet Take 2 Tabs by mouth three (3) times daily as needed for Pain. 100 Tab 2    levothyroxine (SYNTHROID) 75 mcg tablet Take 1 Tab by mouth daily. 30 Tab 5    loratadine (CLARITIN) 10 mg tablet Take 1 Tab by mouth daily. For phlegm in throat 30 Tab 3    metFORMIN (GLUCOPHAGE) 1,000 mg tablet Take 1 Tab by mouth two (2) times daily (with meals). 180 Tab 1    valsartan (DIOVAN) 160 mg tablet Take 1 Tab by mouth daily. 90 Tab 1    hydroCHLOROthiazide (HYDRODIURIL) 12.5 mg tablet Take 1 Tab by mouth daily.  90 Tab 1    amLODIPine (NORVASC) 10 mg tablet Take 1 Tab by mouth daily. 90 Tab 1    MULTIVITAMIN W-MINERALS/LUTEIN (CENTRUM SILVER PO) Take  by mouth. Takes one po daily       OTHER Salamol CFC free inhaler as needed. Medication from [facility].  ALBUTEROL SULFATE (VENTOLIN PO) Take  by mouth as needed. No current facility-administered medications on file prior to visit. Objective:     Vitals:    07/21/17 1108 07/21/17 1114   BP: 178/77 175/83   Pulse: 94 94   Temp: 98.7 °F (37.1 °C)    TempSrc: Oral    Weight: 160 lb (72.6 kg)        Physical Examination:  General appearance - alert, well appearing, and in no distress  Eyes -sclera anicteric  Neck - supple, no significant adenopathy, no thyromegaly  Chest - clear to auscultation, no wheezes, rales or rhonchi, symmetric air entry  Heart - normal rate, regular rhythm, normal S1, S2, no murmurs, rubs, clicks or gallops  Neurological - alert, oriented, no focal findings or movement disorder noted    7/14 film   EXAM:  XR HIPS BI W AP PELV     INDICATION:   Pain when standing up or standing in place.     COMPARISON: 4/25/2016.     FINDINGS: An AP view of the pelvis and frogleg lateral views of both hips  demonstrate no acute fracture or dislocation. Note is made of slight subluxation  at the pubic symphysis, unchanged. Minimal degenerative changes are seen in the  right hip. Degenerative changes are noted in the lower lumbar spine. A calcified  fibroid projects in the pelvis.     IMPRESSION   IMPRESSION:  Minimal degenerative changes in the right hip. Please see above  report. Assessment/ Plan:   Lasha Duran was seen today for other. Diagnoses and all orders for this visit:    Essential hypertension    Primary osteoarthritis of both hips  -     REFERRAL TO PHYSICAL THERAPY      Advised Pt to swap to US version/ingredients of ex-forge ASAP and we would recheck her BP in 4-6 weeks.      Refer to PT via AN for OA confirmed on x-rays of hips to improve strength/functioning and reduce pain. Check BMP and TSH at f/u to confirm renal tolerance and thyroid responding. Titrate tylenol to TID at 1g as tolerated. I have discussed the diagnosis with the patient and the intended plan as seen in the above orders. The patient has received an after-visit summary and questions were answered concerning future plans. I have discussed medication side effects and warnings with the patient as well. The patient verbalizes understanding and agreement with the plan. Follow-up Disposition:  Return in about 6 weeks (around 9/1/2017).

## 2017-07-21 NOTE — PROGRESS NOTES
Patient seen for discharge and no  needed per the patient. We reviewed the AVS and at her request, called her [address] on [facility] to make sure that she can still  the blood pressure medications ordered for her on 06-05-17. I spoke with Rachel Villasenor, pharmacist, who stated that they would have the amlodipine, HCTZ and Valsartan ready for the patient this afternoon. A Good Rx coupon for each prescription was printed and given to the patient. We also discussed the referral to Physical Therapy through [facility]. She will go now to registration to schedule a 4-6 week provider follow-up appointment and then to see Lisa for [facility].  Zenaida Hawley RN

## 2017-07-21 NOTE — PATIENT INSTRUCTIONS
Osteoarthritis: Care Instructions  Your Care Instructions    Arthritis is a common health problem in which the joints are inflamed. There are several kinds of arthritis. Osteoarthritis is caused by a breakdown of cartilage, the hard, thick tissue that cushions the joints. It causes pain, stiffness, and swelling, often in the spine, fingers, hips, and knees. Osteoarthritis can happen at any age, but it is most common in older people. Osteoarthritis never goes away completely, but it can be controlled. Medicine and home treatment can reduce the pain and prevent the arthritis from getting worse. Follow-up care is a key part of your treatment and safety. Be sure to make and go to all appointments, and call your doctor if you are having problems. It's also a good idea to know your test results and keep a list of the medicines you take. How can you care for yourself at home? · Take a warm shower or bath in the morning to relieve stiffness. Avoid sitting still afterwards. · If the joint is not swollen, use moist heat, like a warm, damp towel, for 20 to 30 minutes, 2 or 3 times a day. Do not use heat on a swollen joint. · If the joint is swollen, use ice or cold packs for 10 to 20 minutes, once an hour. Cold will help relieve pain and reduce inflammation. Put a thin cloth between the ice and your skin. · To prevent stiffness, gently move the joint through its full range of motion several times a day. · If the joint hurts, avoid activities that put a strain on it for a few days. Take rest breaks throughout the day. · Get regular exercise. Walking, swimming, yoga, biking, tl chi, and water aerobics are good exercises that are gentle on the joints. · Reach and stay at a healthy weight. If you need to lose or maintain weight, regular exercise and a healthy diet will help. Extra weight can strain the joints, especially the knees and hips, and make the pain worse. Losing even a few pounds may help.   · Take pain medicines exactly as directed. ¨ If the doctor gave you a prescription medicine for pain, take it as prescribed. ¨ If you are not taking a prescription pain medicine, ask your doctor if you can take an over-the-counter medicine. When should you call for help? Call your doctor now or seek immediate medical care if:  · The pain is so bad that you cannot use the joint. · You have sudden back pain with weakness in your legs or loss of bowel or bladder control. · Your stools are black and tarlike or have streaks of blood. · You have severe pain and swelling in more than one joint. Watch closely for changes in your health, and be sure to contact your doctor if:  · You have side effects from the medicines, like belly pain, ongoing heartburn, or nausea. · Joint pain continues for more than 6 weeks, and home treatment is not helping. Where can you learn more? Go to http://harmony-young.info/. Enter K878 in the search box to learn more about \"Osteoarthritis: Care Instructions. \"  Current as of: November 28, 2016  Content Version: 11.3  © 6227-7520 [employer]. Care instructions adapted under license by [employer] (which disclaims liability or warranty for this information). If you have questions about a medical condition or this instruction, always ask your healthcare professional. [address] any warranty or liability for your use of this information.

## 2017-09-13 ENCOUNTER — OFFICE VISIT (OUTPATIENT)
Dept: FAMILY MEDICINE CLINIC | Age: 77
End: 2017-09-13

## 2017-09-13 ENCOUNTER — HOSPITAL ENCOUNTER (OUTPATIENT)
Dept: LAB | Age: 77
Discharge: HOME | End: 2017-09-13

## 2017-09-13 VITALS
HEART RATE: 90 BPM | BODY MASS INDEX: 32.63 KG/M2 | DIASTOLIC BLOOD PRESSURE: 83 MMHG | SYSTOLIC BLOOD PRESSURE: 166 MMHG | WEIGHT: 164 LBS | TEMPERATURE: 98.1 F

## 2017-09-13 DIAGNOSIS — E11.9 TYPE 2 DIABETES MELLITUS WITHOUT COMPLICATION, WITHOUT LONG-TERM CURRENT USE OF INSULIN (HCC): Primary | ICD-10-CM

## 2017-09-13 DIAGNOSIS — I10 HTN, GOAL BELOW 140/90: ICD-10-CM

## 2017-09-13 DIAGNOSIS — M54.50 LOW BACK PAIN AT MULTIPLE SITES: ICD-10-CM

## 2017-09-13 DIAGNOSIS — E03.9 ACQUIRED HYPOTHYROIDISM: ICD-10-CM

## 2017-09-13 DIAGNOSIS — Z23 ENCOUNTER FOR IMMUNIZATION: ICD-10-CM

## 2017-09-13 LAB — GLUCOSE POC: NORMAL MG/DL

## 2017-09-13 PROCEDURE — 80061 LIPID PANEL: CPT | Performed by: FAMILY MEDICINE

## 2017-09-13 PROCEDURE — 82043 UR ALBUMIN QUANTITATIVE: CPT | Performed by: FAMILY MEDICINE

## 2017-09-13 PROCEDURE — 84443 ASSAY THYROID STIM HORMONE: CPT | Performed by: FAMILY MEDICINE

## 2017-09-13 PROCEDURE — 83036 HEMOGLOBIN GLYCOSYLATED A1C: CPT | Performed by: FAMILY MEDICINE

## 2017-09-13 RX ORDER — ACETAMINOPHEN AND CODEINE PHOSPHATE 300; 30 MG/1; MG/1
1 TABLET ORAL
Qty: 30 TAB | Refills: 0 | Status: SHIPPED | OUTPATIENT
Start: 2017-09-13 | End: 2017-12-29

## 2017-09-13 RX ORDER — CYCLOBENZAPRINE HCL 5 MG
5 TABLET ORAL
Qty: 30 TAB | Refills: 0 | Status: SHIPPED | OUTPATIENT
Start: 2017-09-13 | End: 2017-12-29 | Stop reason: SDUPTHER

## 2017-09-13 NOTE — PROGRESS NOTES
Deneen Smith is a 65 y.o. female    Issues discussed today include:    1) DMII:  Pt reports her sugar is generally well controlled. She checks about once daily, ranging . Taking metformin 1000mg bid. Attempting to follow a DM diet. 2) HTN:  Chronic. She checks her BP once daily and brings a log today with readings in 130-140/80s. Taking valsartan 160mg daily and norvasc 10mg daily. BP high today. Daughter says it is not usually like this, but she is a bit stressed due to being at the clinic and waiting. 3) Hypothyroidism:  Chronic. Taking levothyroxine 75mcg daily. Last TSH 3 months ago was low, so med dose was lowered from 88 mcg to 75mcg. Pt denies weight change, tremor, stool changes. Needs refill on levothyroxine in ~ 2 weeks. 4) Low back pain:  Chronic, comes and goes. Located in midline lower back without radiation. Per daughter some days she cannot get out of bed for the pain. She has tried tylenol without relief. She is doing PT and home exercise assignments without relief. Denies h/o trauma or injury to the back. No incontinence or numbness, weakness. She and daughter ask if there is anything else she can take to be able to do therapy and get out and about when the pain is severe. Data reviewed or ordered today:       Other problems include:  Patient Active Problem List   Diagnosis Code    Acute pancreatitis K85.90    Hypokalemia E87.6    DM (diabetes mellitus) ([facility] [address] E11.9    HTN (hypertension) I10    Unspecified hypothyroidism E03.9    Asthma J45.909    Mixed hyperlipidemia E78.2    Primary osteoarthritis of both hips M16.0       Medications:  Current Outpatient Prescriptions   Medication Sig Dispense Refill    cyclobenzaprine (FLEXERIL) 5 mg tablet Take 1 Tab by mouth nightly as needed for Muscle Spasm(s).  30 Tab 0    acetaminophen-codeine (TYLENOL-CODEINE #3) 300-30 mg per tablet Take 1 Tab by mouth every four (4) hours as needed for Pain (moderate to severe pain). Max Daily Amount: 6 Tabs. 30 Tab 0    levothyroxine (SYNTHROID) 75 mcg tablet Take 1 Tab by mouth daily. 90 Tab 1    loratadine (CLARITIN) 10 mg tablet Take 1 Tab by mouth daily. For phlegm in throat 30 Tab 3    metFORMIN (GLUCOPHAGE) 1,000 mg tablet Take 1 Tab by mouth two (2) times daily (with meals). 180 Tab 1    valsartan (DIOVAN) 160 mg tablet Take 1 Tab by mouth daily. 90 Tab 1    hydroCHLOROthiazide (HYDRODIURIL) 12.5 mg tablet Take 1 Tab by mouth daily. 90 Tab 1    amLODIPine (NORVASC) 10 mg tablet Take 1 Tab by mouth daily. 90 Tab 1    MULTIVITAMIN W-MINERALS/LUTEIN (CENTRUM SILVER PO) Take  by mouth. Takes one po daily       OTHER Salamol CFC free inhaler as needed. Medication from [facility].  ALBUTEROL SULFATE (VENTOLIN PO) Take  by mouth as needed. Allergies: Allergies   Allergen Reactions    Aspirin Other (comments)     Pt states she is asthmatic and \"knows not to take ASA\"    Aspirin Unknown (comments)     Unsure of reaction    Shellfish Containing Products Other (comments)     Asthma attack    Shellfish Derived Hives       LMP:  No LMP recorded. Patient is postmenopausal.    Social History     Social History    Marital status:      Spouse name: N/A    Number of children: N/A    Years of education: N/A     Occupational History    Not on file.      Social History Main Topics    Smoking status: Never Smoker    Smokeless tobacco: Never Used    Alcohol use Yes      Comment: 1 glass of wine on Sundays    Drug use: No    Sexual activity: Not on file     Other Topics Concern    Not on file     Social History Narrative    ** Merged History Encounter **         ** Merged History Encounter **            Family History   Problem Relation Age of Onset    Hypertension Father        ROS:   Chest Pain:  No  SOB:  No      Physical Exam  Visit Vitals    /83 (BP 1 Location: Left arm, BP Patient Position: Sitting)    Pulse 90    Temp 98.1 °F (36.7 °C) (Oral)    Wt 164 lb (74.4 kg)    BMI 32.63 kg/m2     BP Readings from Last 3 Encounters:   09/13/17 166/83   07/21/17 175/83   07/12/17 150/90     Constitutional: Appears well,  No acute distress, Vitals noted  Psychiatric:  Affect normal, Alert and Oriented to person/place/time  Eyes:  Conjunctiva clear, no drainage  ENT:  External ears and nose normal, Teeth and gums appear healthy, Mucous membranes moist  Neck:  General inspection normal. Supple. Heart:  Normal HR, Normal S1 and S2,  Regular rhythm. No murmurs, rubs or gallops. No carotid bruit. Lungs:  Clear to auscultation, good respiratory effort, no wheezes, rales or rhonchi  Back: no obvious deformity, no CVAT, + tenderness to L3-5, no paraspinal or sciatic notch ttp, patella DTRs 1+ bilat  Extremities:  Without edema, good peripheral pulses  Skin:  Warm to palpation, without rashes        Assessment/Plan:      ICD-10-CM ICD-9-CM    1. Type 2 diabetes mellitus without complication, without long-term current use of insulin (HCC) E11.9 250.00 AMB POC GLUCOSE BLOOD, BY GLUCOSE MONITORING DEVICE      HEMOGLOBIN A1C WITH EAG      MICROALBUMIN, UR, RAND W/ MICROALBUMIN/CREA RATIO      LIPID PANEL      PNEUMOCOCCAL POLYSACCHARIDE VACCINE, 23-VALENT, ADULT OR IMMUNOSUPPRESSED PT DOSE,   2. HTN, goal below 140/90 I10 401.9    3. Acquired hypothyroidism E03.9 244.9 TSH 3RD GENERATION   4. Low back pain at multiple sites M54.5 724.2 cyclobenzaprine (FLEXERIL) 5 mg tablet      acetaminophen-codeine (TYLENOL-CODEINE #3) 300-30 mg per tablet   5. Encounter for immunization Z23 V03.89 PNEUMOCOCCAL POLYSACCHARIDE VACCINE, 23-VALENT, ADULT OR IMMUNOSUPPRESSED PT DOSE,      CANCELED: PNEUMOCOCCAL POLYSACCHARIDE VACCINE, 23-VALENT, ADULT OR IMMUNOSUPPRESSED PT DOSE,       DM - prev well controlled by A1c and home log. Due for q3mo A1c today - could consider spacing labs out to q6mo if continues to have good control. HTN - not at goal, repeat a bit better.  Continue checking at home and bring BP cuff to next appt to correlate with clinic cuff. Back pain - added flexeril at night prn and tylenol #3 only for more severe pain, prior to PT to help her participate fully    2nd/final geriatric PNA vaccine today    Follow-up Disposition:  Return in about 3 months (around 12/13/2017) for HTN and Diabetes . Consider f/u at [facility] to have appts with Deetta Speed and eliminate need to wait all day. Pt also has multiple medical problems requirement regular follow up, so the clinic may be more comfortable, convenient and suited to her needs.         MD Manolo Hanks Ladd Lessen

## 2017-09-13 NOTE — PROGRESS NOTES
Requests Pneumococcal vaccine; denies fever, egg allergy. [facility] given per protocol and [facility] VIS information sheet given, explained possible S/E. Reviewed sx indicating need to be seen in ER. Pt had no adverse reaction at time of discharge.  Za Meza RN

## 2017-09-13 NOTE — MR AVS SNAPSHOT
Visit Information Date & Time Provider Department Dept. Phone Encounter #  
 9/13/2017  1:15 PM Dg Hagan MD [address] [phone] [id number] Follow-up Instructions Return in about 3 months (around 12/13/2017) for HTN and Diabetes . Upcoming Health Maintenance Date Due  
 FOOT EXAM Q1 9/4/1950 MICROALBUMIN Q1 9/4/1950 EYE EXAM RETINAL OR DILATED Q1 9/4/1949 DTaP/Tdap/Td series (1 - Tdap) 9/4/1960 ZOSTER VACCINE AGE 60> 7/4/1999 GLAUCOMA SCREENING Q2Y 9/4/2004 OSTEOPOROSIS SCREENING (DEXA) 9/4/2004 Pneumococcal 65+ Low/Medium Risk (1 of 2 - PCV13) 9/4/2004 MEDICARE YEARLY EXAM 9/4/2004 LIPID PANEL Q1 10/29/2012 INFLUENZA AGE 9 TO ADULT 10/1/2017* HEMOGLOBIN A1C Q6M 12/5/2017 *Topic was postponed. The date shown is not the original due date. Allergies as of 9/13/2017  Review Complete On: 9/13/2017 By: Dg Hagan MD  
  
 Severity Noted Reaction Type Reactions Aspirin  10/28/2011    Other (comments) Pt states she is asthmatic and \"knows not to take ASA\" Aspirin  11/04/2011    Unknown (comments) Unsure of reaction Shellfish Containing Products  11/04/2011    Other (comments) Asthma attack Shellfish Derived  04/25/2016    Hives Current Immunizations  Never Reviewed Name Date Pneumococcal Polysaccharide (PPSV-23)  Incomplete Not reviewed this visit You Were Diagnosed With   
  
 Codes Comments Type 2 diabetes mellitus without complication, without long-term current use of insulin (HCC)    -  Primary ICD-10-CM: E11.9 ICD-9-CM: 250.00 Vitals BP Pulse Temp Weight(growth percentile) BMI OB Status 166/83 (BP 1 Location: Left arm, BP Patient Position: Sitting) 90 98.1 °F (36.7 °C) (Oral) 164 lb (74.4 kg) 32.63 kg/m2 Postmenopausal  
 Smoking Status Never Smoker Vitals History BMI and BSA Data Body Mass Index Body Surface Area  
 32.63 kg/m 2 1.77 m 2 Preferred Pharmacy Pharmacy Name Phone Rodrigo Mckeon [address], [address] [facility] [phone] Your Updated Medication List  
  
   
This list is accurate as of: 9/13/17  4:02 PM.  Always use your most recent med list.  
  
  
  
  
 acetaminophen-codeine 300-30 mg per tablet Commonly known as:  TYLENOL-CODEINE #3 Take 1 Tab by mouth every four (4) hours as needed for Pain (moderate to severe pain). Max Daily Amount: 6 Tabs. amLODIPine 10 mg tablet Commonly known as:  Cricket Rahul Take 1 Tab by mouth daily. CENTRUM SILVER PO Take  by mouth. Takes one po daily  
  
 cyclobenzaprine 5 mg tablet Commonly known as:  FLEXERIL Take 1 Tab by mouth nightly as needed for Muscle Spasm(s). hydroCHLOROthiazide 12.5 mg tablet Commonly known as:  HYDRODIURIL Take 1 Tab by mouth daily. levothyroxine 75 mcg tablet Commonly known as:  SYNTHROID Take 1 Tab by mouth daily. loratadine 10 mg tablet Commonly known as:  Aidee Galea Take 1 Tab by mouth daily. For phlegm in throat  
  
 metFORMIN 1,000 mg tablet Commonly known as:  GLUCOPHAGE Take 1 Tab by mouth two (2) times daily (with meals). OTHER Salamol CFC free inhaler as needed. Medication from [facility]. valsartan 160 mg tablet Commonly known as:  DIOVAN Take 1 Tab by mouth daily. VENTOLIN PO Take  by mouth as needed. Prescriptions Printed Refills  
 acetaminophen-codeine (TYLENOL-CODEINE #3) 300-30 mg per tablet 0 Sig: Take 1 Tab by mouth every four (4) hours as needed for Pain (moderate to severe pain). Max Daily Amount: 6 Tabs. Class: Print Route: Oral  
  
Prescriptions Sent to Pharmacy Refills  
 cyclobenzaprine (FLEXERIL) 5 mg tablet 0 Sig: Take 1 Tab by mouth nightly as needed for Muscle Spasm(s).   
 Class: Normal  
 Pharmacy: Mitchell Saldaña [address] [place] [address], [address] [facility] #: [phone] Route: Oral  
  
We Performed the Following AMB POC GLUCOSE BLOOD, BY GLUCOSE MONITORING DEVICE [12162 CPT(R)] HEMOGLOBIN A1C WITH EAG [04091 CPT(R)] LIPID PANEL [54279 CPT(R)] MICROALBUMIN, UR, RAND W/ MICROALBUMIN/CREA RATIO [id number] CPT(R)] PNEUMOCOCCAL POLYSACCHARIDE VACCINE, 23-VALENT, ADULT OR IMMUNOSUPPRESSED PT DOSE, [24653 CPT(R)] TSH 3RD GENERATION [83036 CPT(R)] Follow-up Instructions Return in about 3 months (around 12/13/2017) for HTN and Diabetes . Introducing [facility]! Karthik Adams introduces [employer] patient portal. Now you can access parts of your medical record, email your doctor's office, and request medication refills online. 1. In your internet browser, go to [email or web address] 2. Click on the First Time User? Click Here link in the Sign In box. You will see the New Member Sign Up page. 3. Enter your [employer] Access Code exactly as it appears below. You will not need to use this code after youve completed the sign-up process. If you do not sign up before the expiration date, you must request a new code. · [employer] Access Code: [email or web address] Expires: 12/12/2017  3:34 PM 
 
4. Enter the last four digits of your Social Security Number (xxxx) and Date of Birth (mm/dd/yyyy) as indicated and click Submit. You will be taken to the next sign-up page. 5. Create a [employer] ID. This will be your [employer] login ID and cannot be changed, so think of one that is secure and easy to remember. 6. Create a [employer] password. You can change your password at any time. 7. Enter your Password Reset Question and Answer. This can be used at a later time if you forget your password. 8. Enter your e-mail address. You will receive e-mail notification when new information is available in [address] 9. Click Sign Up. You can now view and download portions of your medical record. 10. Click the Download Summary menu link to download a portable copy of your medical information. If you have questions, please visit the Frequently Asked Questions section of the [employer] website. Remember, [employer] is NOT to be used for urgent needs. For medical emergencies, dial 911. Now available from your iPhone and Android! Please provide this summary of care documentation to your next provider. Your primary care clinician is listed as Fanny Valadez. Marilin Barney. If you have any questions after today's visit, please call [phone].

## 2017-09-13 NOTE — PROGRESS NOTES
Reviewed AVS, prescription and pharmacy location with patient and daughter. Hollychandrikan[address] coupon card given instructed patient to hand over to pharmacist for discount to take place. Pt aware that she must return to see the health provider in 3 months for follow up. Patient interested in doing [facility] financial screening, patient directed to registration to make appt. Patient verbalized understanding . No questions or concern from patient at this time.  Silvina Almendarez RN

## 2017-09-13 NOTE — PROGRESS NOTES
Results for orders placed or performed in visit on 09/13/17   AMB POC GLUCOSE BLOOD, BY GLUCOSE MONITORING DEVICE   Result Value Ref Range    Glucose POC 81 nonfasting mg/dL     Coordination of Care  1. Have you been to the ER, urgent care clinic since your last visit? Hospitalized since your last visit? No    2. Have you seen or consulted any other health care providers outside of the [address] [place] [address] since your last visit? Include any pap smears or colon screening. No    Medications  Medication Reconciliation Performed: no  Patient does need refills     Learning Assessment Complete?  yes

## 2017-09-14 LAB
CHOLEST SERPL-MCNC: 275 MG/DL
CREAT UR-MCNC: 69.5 MG/DL
EST. AVERAGE GLUCOSE BLD GHB EST-MCNC: 134 MG/DL
HBA1C MFR BLD: 6.3 % (ref 4.2–6.3)
HDLC SERPL-MCNC: 58 MG/DL
HDLC SERPL: 4.7 {RATIO} (ref 0–5)
LDLC SERPL CALC-MCNC: 169.6 MG/DL (ref 0–100)
LIPID PROFILE,FLP: ABNORMAL
MICROALBUMIN UR-MCNC: 2.27 MG/DL
MICROALBUMIN/CREAT UR-RTO: 33 MG/G (ref 0–30)
TRIGL SERPL-MCNC: 237 MG/DL (ref ?–150)
TSH SERPL DL<=0.05 MIU/L-ACNC: 0.43 UIU/ML (ref 0.36–3.74)
VLDLC SERPL CALC-MCNC: 47.4 MG/DL

## 2017-09-15 DIAGNOSIS — E03.9 UNSPECIFIED HYPOTHYROIDISM: ICD-10-CM

## 2017-09-15 RX ORDER — LEVOTHYROXINE SODIUM 75 UG/1
75 TABLET ORAL DAILY
Qty: 90 TAB | Refills: 1 | Status: SHIPPED | OUTPATIENT
Start: 2017-09-15 | End: 2018-06-28 | Stop reason: SDUPTHER

## 2017-09-15 NOTE — PROGRESS NOTES
A1c 6.3% - well controlled, similar to prior 3 months ago. Can likely space out A1c to q6 months if home glucose monitoring is reassuring and c/w continued control. Urine microalbumin - Urine protein test is nearly in normal range, so no strong evidence of early kidney damage  Lipid panel - tot chol 275,  both high. 10 yr ASCVD risk of 58.8%. Given age, I would recommend considering high intensity statin at next appointment after discussing risks and benefits with pt. Wonder why she is not already on it, perhaps didn't tolerate previously?   TSH - now in normal range, I have refilled levothyroxine at the same dose of 75mcg daily

## 2017-11-10 ENCOUNTER — OFFICE VISIT (OUTPATIENT)
Dept: FAMILY MEDICINE CLINIC | Age: 77
End: 2017-11-10

## 2017-11-10 VITALS
DIASTOLIC BLOOD PRESSURE: 77 MMHG | HEIGHT: 60 IN | HEART RATE: 93 BPM | BODY MASS INDEX: 31.8 KG/M2 | TEMPERATURE: 98.4 F | SYSTOLIC BLOOD PRESSURE: 147 MMHG | WEIGHT: 162 LBS

## 2017-11-10 DIAGNOSIS — E11.9 TYPE 2 DIABETES MELLITUS WITHOUT COMPLICATION, WITHOUT LONG-TERM CURRENT USE OF INSULIN (HCC): ICD-10-CM

## 2017-11-10 DIAGNOSIS — Z23 ENCOUNTER FOR IMMUNIZATION: ICD-10-CM

## 2017-11-10 DIAGNOSIS — M25.562 CHRONIC PAIN OF LEFT KNEE: ICD-10-CM

## 2017-11-10 DIAGNOSIS — E78.2 MIXED HYPERLIPIDEMIA: ICD-10-CM

## 2017-11-10 DIAGNOSIS — G89.29 CHRONIC PAIN OF LEFT KNEE: ICD-10-CM

## 2017-11-10 DIAGNOSIS — I10 ESSENTIAL HYPERTENSION: Primary | ICD-10-CM

## 2017-11-10 DIAGNOSIS — E03.9 ACQUIRED HYPOTHYROIDISM: ICD-10-CM

## 2017-11-10 LAB — GLUCOSE POC: 165 MG/DL

## 2017-11-10 RX ORDER — VALSARTAN 320 MG/1
320 TABLET ORAL DAILY
Qty: 90 TAB | Refills: 1 | Status: SHIPPED | OUTPATIENT
Start: 2017-11-10 | End: 2018-05-15 | Stop reason: SDUPTHER

## 2017-11-10 RX ORDER — ROSUVASTATIN CALCIUM 10 MG/1
10 TABLET, COATED ORAL
Qty: 30 TAB | Refills: 1 | Status: SHIPPED | OUTPATIENT
Start: 2017-11-10 | End: 2018-01-26 | Stop reason: SDUPTHER

## 2017-11-10 RX ORDER — METFORMIN HYDROCHLORIDE 1000 MG/1
1000 TABLET ORAL 2 TIMES DAILY WITH MEALS
Qty: 180 TAB | Refills: 1 | Status: SHIPPED | OUTPATIENT
Start: 2017-11-10 | End: 2018-06-27 | Stop reason: SDUPTHER

## 2017-11-10 RX ORDER — HYDROCHLOROTHIAZIDE 12.5 MG/1
12.5 TABLET ORAL DAILY
Qty: 90 TAB | Refills: 1 | Status: SHIPPED | OUTPATIENT
Start: 2017-11-10 | End: 2018-01-26 | Stop reason: SDUPTHER

## 2017-11-10 NOTE — PROGRESS NOTES
Alexis Roche seen at d/c, given AVS and reviewed today's visit with patient. Patient instructions by provider were read to patient and reviewed with her in great detail step by step including knee x-ray. This has been fully explained to the patient, who indicates understanding.

## 2017-11-10 NOTE — PROGRESS NOTES
Chief Complaint   Patient presents with    Diabetes    Knee Pain     right,     Back Pain     Coordination of Care  1. Have you been to the ER, urgent care clinic since your last visit? Hospitalized since your last visit? No    2. Have you seen or consulted any other health care providers outside of the [address] [place] [address] since your last visit? Include any pap smears or colon screening. No    Medications  Does the patient need refills? YES    Learning Assessment Complete? yes      Requests flu vaccine. Denies fever and egg allergy. VIS information sheet given to patient. Explained possible s/e. Reviewed s/sx indicating need to be seen in ER. Patient had no adverse reaction at time of discharge. Entered into VIIS.

## 2017-11-10 NOTE — PROGRESS NOTES
Assessment/Plan:       ICD-10-CM ICD-9-CM    1. Type 2 diabetes mellitus without complication, without long-term current use of insulin (HCC) E11.9 250.00 AMB POC GLUCOSE, QUANTITATIVE, BLOOD   2. Encounter for immunization Z23 V03.89 INFLUENZA VIRUS VAC QUAD,SPLIT,PRESV FREE SYRINGE IM    Greater than 50% of this 25 minute visit was spent in face-to-face counseling/coordination of care regarding diabetes management. Follow-up Disposition: Not on File Changes made:      [id number] [place], [place] - [address] [facility] [address]  Phone: [phone] Fax: [address]  [address] [place] [address]  Phone: [phone] Fax: [phone]      [facility]  Subjective:     Chief Complaint   Patient presents with    Diabetes    Knee Pain     right,     Back Pain    Merlin Smiling is a 65 y.o. BLACK OR  female who speaks [place]. She is taking a cough syrup twice a day. Last night/this morning or typical day, medications/food: fresh orange, bowl of cereal, milk. Took metformin and hypertensive medication and levothyroxine (takes this first). Work (in or outside the home): no   Physical Activity in addition to working? no and tries to walk in the morning but right knee hurts. She has done 10 weeks, once a week for physical therapy. Now pain in the left shoulder. Can't reach around behind her. Blood pressures as high as 159 over 100 but mostly 140's/ near 90. Measuring glucoses? yes  Outpatient Medications Prior to Visit   Medication Sig Dispense Refill    levothyroxine (SYNTHROID) 75 mcg tablet Take 1 Tab by mouth daily. 90 Tab 1    cyclobenzaprine (FLEXERIL) 5 mg tablet Take 1 Tab by mouth nightly as needed for Muscle Spasm(s).  30 Tab 0    acetaminophen-codeine (TYLENOL-CODEINE #3) 300-30 mg per tablet Take 1 Tab by mouth every four (4) hours as needed for Pain (moderate to severe pain). Max Daily Amount: 6 Tabs. 30 Tab 0    loratadine (CLARITIN) 10 mg tablet Take 1 Tab by mouth daily. For phlegm in throat 30 Tab 3    metFORMIN (GLUCOPHAGE) 1,000 mg tablet Take 1 Tab by mouth two (2) times daily (with meals). 180 Tab 1    valsartan (DIOVAN) 160 mg tablet Take 1 Tab by mouth daily. 90 Tab 1    hydroCHLOROthiazide (HYDRODIURIL) 12.5 mg tablet Take 1 Tab by mouth daily. 90 Tab 1    amLODIPine (NORVASC) 10 mg tablet Take 1 Tab by mouth daily. 90 Tab 1    MULTIVITAMIN W-MINERALS/LUTEIN (CENTRUM SILVER PO) Take  by mouth. Takes one po daily       OTHER Salamol CFC free inhaler as needed. Medication from [facility].  ALBUTEROL SULFATE (VENTOLIN PO) Take  by mouth as needed. No facility-administered medications prior to visit. Brought in medications? no Taking medications as prescribed? yes  ROS:   no polyuria or polydipsia, no chest pain, dyspnea or TIA's, no numbness, tingling or pain in extremities. Social History: She reports that she has never smoked. She has never used smokeless tobacco. She reports that she drinks alcohol. She reports that she does not use illicit drugs. Family History: Her family history includes Hypertension in her father. Surgical History:   Past Surgical History:   Procedure Laterality Date    HX GYN      2 c-sections    HX GYN       x 2    HX OTHER SURGICAL      thyroid removed    HX OTHER SURGICAL      thyroidectomy - partial    AR EGD INTRMURAL NEEDLE ASPIR/BIOP ALTERED ANATOMY  10/31/2011          Objective:     Vitals:    11/10/17 1002   BP: 147/77   Pulse: 93   Temp: 98.4 °F (36.9 °C)   TempSrc: Oral   Weight: 162 lb (73.5 kg)   Height: 4' 11.5\" (1.511 m)    No LMP recorded.  Patient is postmenopausal.  Results for orders placed or performed in visit on 11/10/17   AMB POC GLUCOSE, QUANTITATIVE, BLOOD   Result Value Ref Range    Glucose  mg/dL      Wt Readings from Last 2 Encounters:   11/10/17 162 lb (73.5 kg)   09/13/17 164 lb (74.4 kg)    Weight decreased; diamicron MR 14DL= glicazide (she lost her  and is staying)  Hemoglobin A1c   Date Value Ref Range Status   09/13/2017 6.3 4.2 - 6.3 % Final   06/05/2017 6.0 4.2 - 6.3 % Final    A1C not significantly changed;   Lab Results   Component Value Date/Time    Microalbumin/Creat ratio (mg/g creat) 33 09/13/2017 03:47 PM    Microalbumin,urine random 2.27 09/13/2017 03:47 PM    Creatinine 1.03 06/05/2017 11:42 AM    simvastatin, atorvastatin, crestor  Lab Results   Component Value Date/Time    GFR est AA >60 06/05/2017 11:42 AM    GFR est non-AA 52 06/05/2017 11:42 AM       Lab Results   Component Value Date/Time    Cholesterol, total 275 09/13/2017 03:47 PM    HDL Cholesterol 58 09/13/2017 03:47 PM    LDL, calculated 169.6 09/13/2017 03:47 PM    Triglyceride 237 09/13/2017 03:47 PM    CHOL/HDL Ratio 4.7 09/13/2017 03:47 PM      Lab Results   Component Value Date/Time    ALT (SGPT) 23 06/05/2017 11:42 AM    AST (SGOT) 21 06/05/2017 11:42 AM    Alk. phosphatase 87 06/05/2017 11:42 AM    Bilirubin, total 0.6 06/05/2017 11:42 AM     Constitutional: She appears well-developed. Eyes: EOM are normal. Pupils are equal, round, and reactive to light. Neck: Neck supple. No thyromegaly present. Cardiovascular: Normal rate, regular rhythm, normal heart sounds and intact distal pulses. No murmur heard. Pulmonary/Chest: Effort normal and breath sounds normal.   Musculoskeletal: She exhibits no edema. No ulcers of the lower extremities. Assessment/Plan:   Diagnoses and all orders for this visit:    1. Type 2 diabetes mellitus without complication, without long-term current use of insulin (HCC)  -     AMB POC GLUCOSE, QUANTITATIVE, BLOOD    2. Encounter for immunization  -     Influenza virus vaccine (QUADRIVALENT PRES FREE SYRINGE) IM (19699)    3 months follow up -   Diabetes Mellitus type 2, under Good control. Blood pressure under Fair control. Greater than 50% of this 25 minute visit was spent in face-to-face counseling/coordination of care regarding diabetes management. Follow-up Disposition: Not on File Changes made:   she was on exforge. 160.12.5,10  Add diovan, continue to measure bps, return if still 140s/90  Jamar Kenny DNP, FNP-BC, BC-ADM  Ivan Uribe expressed understanding of this plan.

## 2017-11-10 NOTE — MR AVS SNAPSHOT
Visit Information Date & Time Provider Department Dept. Phone Encounter #  
 11/10/2017  9:30 AM Gordo Villa, [address] [phone] [id number] Follow-up Instructions Return in about 3 months (around 2/10/2018) for hypertension, primary care needs. Upcoming Health Maintenance Date Due  
 FOOT EXAM Q1 9/4/1950 EYE EXAM RETINAL OR DILATED Q1 9/4/1949 DTaP/Tdap/Td series (1 - Tdap) 9/4/1960 ZOSTER VACCINE AGE 60> 7/4/1999 GLAUCOMA SCREENING Q2Y 9/4/2004 OSTEOPOROSIS SCREENING (DEXA) 9/4/2004 MEDICARE YEARLY EXAM 9/4/2004 Influenza Age 5 to Adult 8/1/2017 HEMOGLOBIN A1C Q6M 3/13/2018 Pneumococcal 65+ Low/Medium Risk (2 of 2 - PCV13) 9/13/2018 MICROALBUMIN Q1 9/13/2018 LIPID PANEL Q1 9/13/2018 Allergies as of 11/10/2017  Review Complete On: 11/10/2017 By: Gordo Villa NP Severity Noted Reaction Type Reactions Aspirin  10/28/2011    Other (comments) Pt states she is asthmatic and \"knows not to take ASA\" Aspirin  11/04/2011    Unknown (comments) Unsure of reaction Shellfish Containing Products  11/04/2011    Other (comments) Asthma attack Shellfish Derived  04/25/2016    Hives Current Immunizations  Never Reviewed Name Date Influenza Vaccine (Quad) PF  Incomplete Pneumococcal Polysaccharide (PPSV-23) 9/13/2017 Not reviewed this visit You Were Diagnosed With   
  
 Codes Comments Essential hypertension    -  Primary ICD-10-CM: I10 
ICD-9-CM: 401.9 Type 2 diabetes mellitus without complication, without long-term current use of insulin (HCC)     ICD-10-CM: E11.9 ICD-9-CM: 250.00 Encounter for immunization     ICD-10-CM: J22 ICD-9-CM: V03.89 Mixed hyperlipidemia     ICD-10-CM: E78.2 ICD-9-CM: 272.2 Acquired hypothyroidism     ICD-10-CM: E03.9 ICD-9-CM: 410. 9 Chronic pain of left knee     ICD-10-CM: M25.562, G89.29 ICD-9-CM: 719.46, 338.29 Vitals BP Pulse Temp Height(growth percentile) Weight(growth percentile) BMI  
 147/77 93 98.4 °F (36.9 °C) (Oral) 4' 11.5\" (1.511 m) 162 lb (73.5 kg) 32.17 kg/m2 OB Status Smoking Status Postmenopausal Never Smoker Vitals History BMI and BSA Data Body Mass Index Body Surface Area  
 32.17 kg/m 2 1.76 m 2 Preferred Pharmacy Pharmacy Name Phone Delroy Coyne [address] , [address] [facility] [phone] Your Updated Medication List  
  
   
This list is accurate as of: 11/10/17 11:04 AM.  Always use your most recent med list.  
  
  
  
  
 acetaminophen-codeine 300-30 mg per tablet Commonly known as:  TYLENOL-CODEINE #3 Take 1 Tab by mouth every four (4) hours as needed for Pain (moderate to severe pain). Max Daily Amount: 6 Tabs. amLODIPine 10 mg tablet Commonly known as:  Stevenson Del Take 1 Tab by mouth daily. CENTRUM SILVER PO Take  by mouth. Takes one po daily  
  
 cyclobenzaprine 5 mg tablet Commonly known as:  FLEXERIL Take 1 Tab by mouth nightly as needed for Muscle Spasm(s). hydroCHLOROthiazide 12.5 mg tablet Commonly known as:  HYDRODIURIL Take 1 Tab by mouth daily. levothyroxine 75 mcg tablet Commonly known as:  SYNTHROID Take 1 Tab by mouth daily. loratadine 10 mg tablet Commonly known as:  Garlan Baas Take 1 Tab by mouth daily. For phlegm in throat  
  
 metFORMIN 1,000 mg tablet Commonly known as:  GLUCOPHAGE Take 1 Tab by mouth two (2) times daily (with meals). OTHER Salamol CFC free inhaler as needed. Medication from [facility]. rosuvastatin 10 mg tablet Commonly known as:  CRESTOR Take 1 Tab by mouth nightly. valsartan 320 mg tablet Commonly known as:  DIOVAN Take 1 Tab by mouth daily. VENTOLIN PO Take  by mouth as needed. Prescriptions Sent to Pharmacy  Refills  
 rosuvastatin (CRESTOR) 10 mg tablet 1  
 Sig: Take 1 Tab by mouth nightly. Class: Normal  
 Pharmacy: [address] Ph #: [phone] Route: Oral  
 valsartan (DIOVAN) 320 mg tablet 1 Sig: Take 1 Tab by mouth daily. Class: Normal  
 Pharmacy: [address] Ph #: [phone] Route: Oral  
 metFORMIN (GLUCOPHAGE) 1,000 mg tablet 1 Sig: Take 1 Tab by mouth two (2) times daily (with meals). Class: Normal  
 Pharmacy: [address] Ph #: [phone] Route: Oral  
 hydroCHLOROthiazide (HYDRODIURIL) 12.5 mg tablet 1 Sig: Take 1 Tab by mouth daily. Class: Normal  
 Pharmacy: [address] Ph #: [phone] Route: Oral  
  
We Performed the Following AMB POC GLUCOSE, QUANTITATIVE, BLOOD [02229 CPT(R)] INFLUENZA VIRUS VAC QUAD,SPLIT,PRESV FREE SYRINGE IM [id number] CPT(R)] Follow-up Instructions Return in about 3 months (around 2/10/2018) for hypertension, primary care needs. To-Do List   
 11/10/2017 Imaging:  XR KNEE LT MAX 2 VWS Patient Instructions 1. You are not taking Diamicron (glicazide) or a medicine similar to it right now because YOUR DIABETES IS DOING WELL ON METFORMIN ONLY. We don't want to risk your glucose dropping too low. 2. We increased the dose of your Diovan to 320mg. You may take the pills you have in the house, 160mg, and take 2 of these until they run out, then buy the new dose of 320mg and take 1 daily. 3. Measure blood pressures. We want to see below 140 over below 90. Return sooner than 3 months if you are feeling dizzy or bad in any way, especially if your blood pressure is much lower while you are dizzy (for example, under 100 / under 60) 4. I have ordered your knee x-ray. Introducing [facility]!    
 [employer]ivid[employer] Sousa introduces Canva patient portal. Now you can access parts of your medical record, email your doctor's office, and request medication refills online. 1. In your internet browser, go to [email or web address] 2. Click on the First Time User? Click Here link in the Sign In box. You will see the New Member Sign Up page. 3. Enter your Miroi Access Code exactly as it appears below. You will not need to use this code after youve completed the sign-up process. If you do not sign up before the expiration date, you must request a new code. · Miroi Access Code: [email or web address] Expires: 12/12/2017  2:34 PM 
 
4. Enter the last four digits of your Social Security Number (xxxx) and Date of Birth (mm/dd/yyyy) as indicated and click Submit. You will be taken to the next sign-up page. 5. Create a Miroi ID. This will be your Miroi login ID and cannot be changed, so think of one that is secure and easy to remember. 6. Create a Miroi password. You can change your password at any time. 7. Enter your Password Reset Question and Answer. This can be used at a later time if you forget your password. 8. Enter your e-mail address. You will receive e-mail notification when new information is available in [address] 9. Click Sign Up. You can now view and download portions of your medical record. 10. Click the Download Summary menu link to download a portable copy of your medical information. If you have questions, please visit the Frequently Asked Questions section of the Miroi website. Remember, Miroi is NOT to be used for urgent needs. For medical emergencies, dial 911. Now available from your iPhone and Android! Please provide this summary of care documentation to your next provider. Your primary care clinician is listed as Moustahpa Alberto. If you have any questions after today's visit, please call [phone].

## 2017-11-10 NOTE — PATIENT INSTRUCTIONS
1. You are not taking Diamicron (glicazide) or a medicine similar to it right now because YOUR DIABETES IS DOING WELL ON METFORMIN ONLY. We don't want to risk your glucose dropping too low. 2. We increased the dose of your Diovan to 320mg. You may take the pills you have in the house, 160mg, and take 2 of these until they run out, then buy the new dose of 320mg and take 1 daily. 3. Measure blood pressures. We want to see below 140 over below 90. Return sooner than 3 months if you are feeling dizzy or bad in any way, especially if your blood pressure is much lower while you are dizzy (for example, under 100 / under 60)  4. I have ordered your knee x-ray.

## 2017-11-13 ENCOUNTER — TELEPHONE (OUTPATIENT)
Dept: FAMILY MEDICINE CLINIC | Age: 77
End: 2017-11-13

## 2017-11-13 NOTE — TELEPHONE ENCOUNTER
Telephone message received from the patient today stating that she saw a provider at [facility] on Friday, 11-10-17, for R knee pain. She is concerned that an xray of her L knee was ordered for her. Routing to the provider for review.  Kenroy Arboleda RN

## 2017-11-20 DIAGNOSIS — M25.561 CHRONIC PAIN OF RIGHT KNEE: Primary | ICD-10-CM

## 2017-11-20 DIAGNOSIS — G89.29 CHRONIC PAIN OF RIGHT KNEE: Primary | ICD-10-CM

## 2017-12-01 ENCOUNTER — HOSPITAL ENCOUNTER (OUTPATIENT)
Dept: GENERAL RADIOLOGY | Age: 77
Discharge: HOME | End: 2017-12-01
Payer: SUBSIDIZED

## 2017-12-01 DIAGNOSIS — G89.29 CHRONIC PAIN OF RIGHT KNEE: ICD-10-CM

## 2017-12-01 DIAGNOSIS — M25.561 CHRONIC PAIN OF RIGHT KNEE: ICD-10-CM

## 2017-12-01 PROCEDURE — 73562 X-RAY EXAM OF KNEE 3: CPT

## 2017-12-09 ENCOUNTER — TELEPHONE (OUTPATIENT)
Dept: FAMILY MEDICINE CLINIC | Age: 77
End: 2017-12-09

## 2017-12-29 ENCOUNTER — DOCUMENTATION ONLY (OUTPATIENT)
Dept: FAMILY MEDICINE CLINIC | Age: 77
End: 2017-12-29

## 2017-12-29 ENCOUNTER — OFFICE VISIT (OUTPATIENT)
Dept: FAMILY MEDICINE CLINIC | Age: 77
End: 2017-12-29

## 2017-12-29 ENCOUNTER — HOSPITAL ENCOUNTER (OUTPATIENT)
Dept: LAB | Age: 77
Discharge: HOME | End: 2017-12-29

## 2017-12-29 VITALS
HEIGHT: 60 IN | WEIGHT: 161 LBS | TEMPERATURE: 98.5 F | DIASTOLIC BLOOD PRESSURE: 82 MMHG | HEART RATE: 97 BPM | BODY MASS INDEX: 31.61 KG/M2 | SYSTOLIC BLOOD PRESSURE: 134 MMHG

## 2017-12-29 DIAGNOSIS — M54.50 LOW BACK PAIN AT MULTIPLE SITES: ICD-10-CM

## 2017-12-29 DIAGNOSIS — E11.9 TYPE 2 DIABETES MELLITUS WITHOUT COMPLICATION, WITHOUT LONG-TERM CURRENT USE OF INSULIN (HCC): Primary | ICD-10-CM

## 2017-12-29 DIAGNOSIS — M17.11 PRIMARY OSTEOARTHRITIS OF RIGHT KNEE: ICD-10-CM

## 2017-12-29 DIAGNOSIS — I10 ESSENTIAL HYPERTENSION: ICD-10-CM

## 2017-12-29 DIAGNOSIS — E11.9 TYPE 2 DIABETES MELLITUS WITHOUT COMPLICATION, WITHOUT LONG-TERM CURRENT USE OF INSULIN (HCC): ICD-10-CM

## 2017-12-29 PROBLEM — E11.21 TYPE 2 DIABETES MELLITUS WITH NEPHROPATHY (HCC): Status: ACTIVE | Noted: 2017-12-29

## 2017-12-29 LAB
EST. AVERAGE GLUCOSE BLD GHB EST-MCNC: 137 MG/DL
GLUCOSE POC: 197 MG/DL
HBA1C MFR BLD: 6.4 % (ref 4.2–6.3)

## 2017-12-29 PROCEDURE — 83036 HEMOGLOBIN GLYCOSYLATED A1C: CPT | Performed by: NURSE PRACTITIONER

## 2017-12-29 RX ORDER — GLIPIZIDE 5 MG/1
TABLET ORAL
Qty: 30 TAB | Refills: 1 | Status: SHIPPED | OUTPATIENT
Start: 2017-12-29 | End: 2018-11-30

## 2017-12-29 RX ORDER — CYCLOBENZAPRINE HCL 5 MG
5 TABLET ORAL
Qty: 30 TAB | Refills: 0 | Status: SHIPPED | OUTPATIENT
Start: 2017-12-29 | End: 2018-02-23

## 2017-12-29 RX ORDER — TRIAMCINOLONE ACETONIDE 40 MG/ML
40 INJECTION, SUSPENSION INTRA-ARTICULAR; INTRAMUSCULAR ONCE
Qty: 1 ML | Refills: 0
Start: 2017-12-29 | End: 2017-12-29

## 2017-12-29 NOTE — PROGRESS NOTES
Assessment/Plan:       ICD-10-CM ICD-9-CM    1. Type 2 diabetes mellitus without complication, without long-term current use of insulin (HCC) E11.9 250.00 AMB POC GLUCOSE, QUANTITATIVE, BLOOD      HEMOGLOBIN A1C WITH EAG   2. Low back pain at multiple sites M54.5 724.2 cyclobenzaprine (FLEXERIL) 5 mg tablet   3. Essential hypertension I10 401.9    A1c today and STEROID INJECTION OF KNEE done by Dr. Leonel Nichole #[id number] - [place], [address] [place] [address] [address]  [address] [place] [address]  Phone: [phone] Fax: [address]  [address] [place] [address]  Phone: [phone] Fax: [phone]      [facility]  Subjective:     Chief Complaint   Patient presents with    Results     pt is here to follow up on knee xray results    Abby Leon is a 65 y.o. BLACK OR  female. HPI: she had tylenol with codeine and had a bad reaction and itching. She  has a past medical history of Asthma; CAD (coronary artery disease); Diabetes ([address] Diabetes ([address] Hypertension; Other ill-defined conditions(799.89); Other ill-defined conditions(799.89); and Thyroid disease. She also has no past medical history of Unspecified sleep apnea. She has Acute pancreatitis, Hypokalemia, DM (diabetes mellitus) ([address] HTN (hypertension), Acquired hypothyroidism, Asthma, Mixed hyperlipidemia, Primary osteoarthritis of both hips, and Type 2 diabetes mellitus with nephropathy ([address] on her problem list.   OK for injection, muscle spasms of back. 118/72, 134/82 for bp; bs 122 before eating  BP from home measurements has improved. Review of Systems: Negative for: fever, chest pain, shortness of breath, leg swelling, exertional dyspnea, palpitations.   Current Medications:   Current Outpatient Prescriptions on File Prior to Visit   Medication Sig    rosuvastatin (CRESTOR) 10 mg tablet Take 1 Tab by mouth nightly.  valsartan (DIOVAN) 320 mg tablet Take 1 Tab by mouth daily.  metFORMIN (GLUCOPHAGE) 1,000 mg tablet Take 1 Tab by mouth two (2) times daily (with meals).  hydroCHLOROthiazide (HYDRODIURIL) 12.5 mg tablet Take 1 Tab by mouth daily.  levothyroxine (SYNTHROID) 75 mcg tablet Take 1 Tab by mouth daily.  loratadine (CLARITIN) 10 mg tablet Take 1 Tab by mouth daily. For phlegm in throat    amLODIPine (NORVASC) 10 mg tablet Take 1 Tab by mouth daily.  MULTIVITAMIN W-MINERALS/LUTEIN (CENTRUM SILVER PO) Take  by mouth. Takes one po daily     OTHER Salamol CFC free inhaler as needed. Medication from [facility].  ALBUTEROL SULFATE (VENTOLIN PO) Take  by mouth as needed. No current facility-administered medications on file prior to visit. Past Surgical History: She  has a past surgical history that includes hx other surgical; hx gyn; pr egd intrmural needle aspir/biop altered anatomy (10/31/2011); hx other surgical; and hx gyn. Social and Family History: She  reports that she has never smoked. She has never used smokeless tobacco. She reports that she drinks alcohol. She reports that she does not use illicit drugs. ; family history includes Hypertension in her father. Objective:     Vitals:    12/29/17 0947 12/29/17 1035   BP: 153/72 134/82   Pulse: 97    Temp: 98.5 °F (36.9 °C)    TempSrc: Oral    Weight: 161 lb (73 kg)    Height: 4' 11.5\" (1.511 m)     No LMP recorded. Patient is postmenopausal.   Wt Readings from Last 2 Encounters:   12/29/17 161 lb (73 kg)   11/10/17 162 lb (73.5 kg)     Results for orders placed or performed in visit on 12/29/17   AMB POC GLUCOSE, QUANTITATIVE, BLOOD   Result Value Ref Range    Glucose  mg/dL    after eating. Today she ate:  1 fresh orange, 1 croissant with chicken salad, coffee no sugar and creamer (plain). Levothyrox (between 4 and 6 am) all 3 bp drugs, metformin after the meal, and centrum silver.  She doesn't always wait 4 hours in between levothyxox and vitamin. Current Outpatient Prescriptions   Medication Sig Dispense Refill    cyclobenzaprine (FLEXERIL) 5 mg tablet Take 1 Tab by mouth nightly as needed for Muscle Spasm(s). 30 Tab 0    rosuvastatin (CRESTOR) 10 mg tablet Take 1 Tab by mouth nightly. 30 Tab 1    valsartan (DIOVAN) 320 mg tablet Take 1 Tab by mouth daily. 90 Tab 1    metFORMIN (GLUCOPHAGE) 1,000 mg tablet Take 1 Tab by mouth two (2) times daily (with meals). 180 Tab 1    hydroCHLOROthiazide (HYDRODIURIL) 12.5 mg tablet Take 1 Tab by mouth daily. 90 Tab 1    levothyroxine (SYNTHROID) 75 mcg tablet Take 1 Tab by mouth daily. 90 Tab 1    loratadine (CLARITIN) 10 mg tablet Take 1 Tab by mouth daily. For phlegm in throat 30 Tab 3    amLODIPine (NORVASC) 10 mg tablet Take 1 Tab by mouth daily. 90 Tab 1    MULTIVITAMIN W-MINERALS/LUTEIN (CENTRUM SILVER PO) Take  by mouth. Takes one po daily       OTHER Salamol CFC free inhaler as needed. Medication from [facility].  ALBUTEROL SULFATE (VENTOLIN PO) Take  by mouth as needed. Physical Examination:  General appearance - well developed, no acute distress. Chest - clear to auscultation. Heart - regular rate and rhythm without murmurs, rubs, or gallops. Abdomen - bowel sounds present x 4, NT, ND. Extremities - pulses intact. No peripheral edema. Assessment/Plan:   Diagnoses and all orders for this visit:    1. Type 2 diabetes mellitus without complication, without long-term current use of insulin ([facility])  -     AMB POC GLUCOSE, QUANTITATIVE, BLOOD  -     HEMOGLOBIN A1C WITH EAG; Future    2. Low back pain at multiple sites  -     cyclobenzaprine (FLEXERIL) 5 mg tablet; Take 1 Tab by mouth nightly as needed for Muscle Spasm(s).     3. Essential hypertension    HTN controlled by home measurements; A1c today and STEROID INJECTION OF KNEE done by Dr. Velma Riley  Follow-up Disposition: Not on File   Carla Leung, EUNICE, FNP-BC, BC-ADM  Jason Pollard expressed understanding of this plan. Add glipizide 5mg   If glucose in morning is 140 or more, take glipizide 5mg, THEN eat, THEN take metformin.

## 2017-12-29 NOTE — PROGRESS NOTES
Pt in need of steroid injection today so performed this today after reviewing chart. Knee x-rays c/w with OA and small effusion. Pt interested in steroid injection today. Last A1C 6.3%  Pt mentioned that glu had been creeping up    VSS  R knee with mild effusion and + crepitus    Procedure:  After verbal consent was obtained, risks and benefits of the procedure were discussed with the patient and alternatives discussed. Cleansed with alcohol pads. Injected mix of 1% lidocaine 2 ml and Kennalog 40 mg into right knee via suprapatellar approach.  The procedure was well tolerated with no complications    [facility]  OFFICE PROCEDURE PROGRESS NOTE         Chart reviewed for the following:   Simran CISSE MD, have reviewed the History, Physical and updated the Allergic reactions for [address] performed immediately prior to start of procedure:   Simran CISSE MD, have performed the following reviews on [address] prior to the start of the procedure:            * Patient was identified by name and date of birth   * Agreement on procedure being performed was verified  * Risks and Benefits explained to the patient  * Procedure site verified and marked as necessary  * Patient was positioned for comfort  * Consent was signed and verified     Time: 10:55 am  Date of procedure: 12/29/2017  Procedure performed by:  Simran Preciado MD  Provider assisted by: Ivania Willingham NP  Patient assisted by: self  How tolerated by patient: tolerated the procedure well with no complications  Post Procedural Pain Scale: 0 - No Hurt

## 2017-12-29 NOTE — PROGRESS NOTES
Chief Complaint   Patient presents with    Results     pt is here to follow up on knee xray results

## 2017-12-29 NOTE — MR AVS SNAPSHOT
Visit Information Date & Time Provider Department Dept. Phone Encounter #  
 12/29/2017  9:30 AM Frankey Lana, [place] [phone] [id number] Follow-up Instructions Return in about 4 weeks (around 1/26/2018) for primary care needs LK. Upcoming Health Maintenance Date Due  
 FOOT EXAM Q1 9/4/1950 EYE EXAM RETINAL OR DILATED Q1 9/4/1949 DTaP/Tdap/Td series (1 - Tdap) 9/4/1960 ZOSTER VACCINE AGE 60> 7/4/1999 GLAUCOMA SCREENING Q2Y 9/4/2004 OSTEOPOROSIS SCREENING (DEXA) 9/4/2004 MEDICARE YEARLY EXAM 9/4/2004 HEMOGLOBIN A1C Q6M 3/13/2018 Pneumococcal 65+ Low/Medium Risk (2 of 2 - PCV13) 9/13/2018 MICROALBUMIN Q1 9/13/2018 LIPID PANEL Q1 9/13/2018 Allergies as of 12/29/2017  Review Complete On: 12/29/2017 By: Fernandez Leyva LPN Severity Noted Reaction Type Reactions Aspirin  10/28/2011    Other (comments) Pt states she is asthmatic and \"knows not to take ASA\" Aspirin  11/04/2011    Unknown (comments) Unsure of reaction Codeine  12/29/2017    Itching Also nausea, diarrhea Shellfish Containing Products  11/04/2011    Other (comments) Asthma attack Shellfish Derived  04/25/2016    Hives Current Immunizations  Never Reviewed Name Date Influenza Vaccine (Quad) PF 11/10/2017 Pneumococcal Polysaccharide (PPSV-23) 9/13/2017 Not reviewed this visit You Were Diagnosed With   
  
 Codes Comments Type 2 diabetes mellitus without complication, without long-term current use of insulin (HCC)    -  Primary ICD-10-CM: E11.9 ICD-9-CM: 250.00 Low back pain at multiple sites     ICD-10-CM: M54.5 ICD-9-CM: 724.2 Essential hypertension     ICD-10-CM: I10 
ICD-9-CM: 401.9 Primary osteoarthritis of right knee     ICD-10-CM: M17.11 ICD-9-CM: 715.16 Vitals BP Pulse Temp Height(growth percentile) Weight(growth percentile) BMI 134/82 97 98.5 °F (36.9 °C) (Oral) 4' 11.5\" (1.511 m) 161 lb (73 kg) 31.97 kg/m2 OB Status Smoking Status Postmenopausal Never Smoker Vitals History BMI and BSA Data Body Mass Index Body Surface Area  
 31.97 kg/m 2 1.75 m 2 Preferred Pharmacy Pharmacy Name Phone Tory Rosenbaum [address] , [address] [facility] [phone] Your Updated Medication List  
  
   
This list is accurate as of: 12/29/17 11:31 AM.  Always use your most recent med list. amLODIPine 10 mg tablet Commonly known as:  [place] Otter Take 1 Tab by mouth daily. CENTRUM SILVER PO Take  by mouth. Takes one po daily  
  
 cyclobenzaprine 5 mg tablet Commonly known as:  FLEXERIL Take 1 Tab by mouth nightly as needed for Muscle Spasm(s). glipiZIDE 5 mg tablet Commonly known as:  Edgar Clapper 1 po qday BEFORE breakfast IF blood sugar measures 140 or higher  
  
 hydroCHLOROthiazide 12.5 mg tablet Commonly known as:  HYDRODIURIL Take 1 Tab by mouth daily. levothyroxine 75 mcg tablet Commonly known as:  SYNTHROID Take 1 Tab by mouth daily. loratadine 10 mg tablet Commonly known as:  Mike Lieu Take 1 Tab by mouth daily. For phlegm in throat  
  
 metFORMIN 1,000 mg tablet Commonly known as:  GLUCOPHAGE Take 1 Tab by mouth two (2) times daily (with meals). OTHER Salamol CFC free inhaler as needed. Medication from [facility]. rosuvastatin 10 mg tablet Commonly known as:  CRESTOR Take 1 Tab by mouth nightly. triamcinolone acetonide 40 mg/mL injection Commonly known as:  KENALOG  
1 mL by IntraMUSCular route once for 1 dose. valsartan 320 mg tablet Commonly known as:  DIOVAN Take 1 Tab by mouth daily. VENTOLIN PO Take  by mouth as needed. Prescriptions Sent to Pharmacy  Refills  
 cyclobenzaprine (FLEXERIL) 5 mg tablet 0  
 Sig: Take 1 Tab by mouth nightly as needed for Muscle Spasm(s). Class: Normal  
 Pharmacy: [address], [address] [place] [address] Ph #: [phone] Route: Oral  
 glipiZIDE (GLUCOTROL) 5 mg tablet 1 Si po qday BEFORE breakfast IF blood sugar measures 140 or higher Class: Normal  
 Pharmacy: [address], [address] [place] [address] Ph #: [phone] We Performed the Following AMB POC GLUCOSE, QUANTITATIVE, BLOOD [62190 CPT(R)] [place] DRAIN/INJECT LARGE JOINT/BURSA [id number] CPT(R)] TRIAMCINOLONE ACETONIDE INJ [ [facility]] Follow-up Instructions Return in about 4 weeks (around 2018) for primary care needs LK. To-Do List   
 2017 Lab:  HEMOGLOBIN A1C WITH EAG Patient Instructions Dental information Introducing [facility] HE[facility]! [employer] introduces Nightingale patient portal. Now you can access parts of your medical record, email your doctor's office, and request medication refills online. 1. In your internet browser, go to [email or web address] 2. Click on the First Time User? Click Here link in the Sign In box. You will see the New Member Sign Up page. 3. Enter your Nightingale Access Code exactly as it appears below. You will not need to use this code after youve completed the sign-up process. If you do not sign up before the expiration date, you must request a new code. · Nightingale Access Code: [email or web address] Expires: 3/29/2018 11:31 AM 
 
4. Enter the last four digits of your Social Security Number (xxxx) and Date of Birth (mm/dd/yyyy) as indicated and click Submit. You will be taken to the next sign-up page. 5. Create a AthleteNetworkt ID. This will be your Nightingale login ID and cannot be changed, so think of one that is secure and easy to remember. 6. Create a Nightingale password. You can change your password at any time. 7. Enter your Password Reset Question and Answer. This can be used at a later time if you forget your password. 8. Enter your e-mail address. You will receive e-mail notification when new information is available in [address] 9. Click Sign Up. You can now view and download portions of your medical record. 10. Click the Download Summary menu link to download a portable copy of your medical information. If you have questions, please visit the Frequently Asked Questions section of the [employer] website. Remember, [employer] is NOT to be used for urgent needs. For medical emergencies, dial 911. Now available from your iPhone and Android! Please provide this summary of care documentation to your next provider. Your primary care clinician is listed as Abbe Galvez. Elvira Cole. If you have any questions after today's visit, please call [phone].

## 2018-01-26 ENCOUNTER — OFFICE VISIT (OUTPATIENT)
Dept: FAMILY MEDICINE CLINIC | Age: 78
End: 2018-01-26

## 2018-01-26 ENCOUNTER — HOSPITAL ENCOUNTER (OUTPATIENT)
Dept: LAB | Age: 78
Discharge: HOME | End: 2018-01-26

## 2018-01-26 VITALS
HEART RATE: 93 BPM | TEMPERATURE: 98.3 F | DIASTOLIC BLOOD PRESSURE: 79 MMHG | BODY MASS INDEX: 30.58 KG/M2 | WEIGHT: 154 LBS | SYSTOLIC BLOOD PRESSURE: 157 MMHG

## 2018-01-26 DIAGNOSIS — Z87.19 HISTORY OF PANCREATITIS: ICD-10-CM

## 2018-01-26 DIAGNOSIS — I10 ESSENTIAL HYPERTENSION: ICD-10-CM

## 2018-01-26 DIAGNOSIS — M51.36 DDD (DEGENERATIVE DISC DISEASE), LUMBAR: ICD-10-CM

## 2018-01-26 DIAGNOSIS — I25.10 CORONARY ARTERY DISEASE DUE TO LIPID RICH PLAQUE: ICD-10-CM

## 2018-01-26 DIAGNOSIS — E11.21 TYPE 2 DIABETES MELLITUS WITH NEPHROPATHY (HCC): ICD-10-CM

## 2018-01-26 DIAGNOSIS — I25.83 CORONARY ARTERY DISEASE DUE TO LIPID RICH PLAQUE: ICD-10-CM

## 2018-01-26 DIAGNOSIS — M16.0 PRIMARY OSTEOARTHRITIS OF BOTH HIPS: Primary | ICD-10-CM

## 2018-01-26 DIAGNOSIS — M79.10 MYALGIA: ICD-10-CM

## 2018-01-26 DIAGNOSIS — E78.2 MIXED HYPERLIPIDEMIA: ICD-10-CM

## 2018-01-26 LAB
CHOLEST SERPL-MCNC: 232 MG/DL
CK SERPL-CCNC: 68 U/L (ref 26–192)
GLUCOSE POC: NORMAL MG/DL
HDLC SERPL-MCNC: 66 MG/DL
HDLC SERPL: 3.5 {RATIO} (ref 0–5)
LDLC SERPL CALC-MCNC: 140.6 MG/DL (ref 0–100)
LIPID PROFILE,FLP: ABNORMAL
TRIGL SERPL-MCNC: 127 MG/DL (ref ?–150)
VLDLC SERPL CALC-MCNC: 25.4 MG/DL

## 2018-01-26 PROCEDURE — 80061 LIPID PANEL: CPT | Performed by: FAMILY MEDICINE

## 2018-01-26 PROCEDURE — 82550 ASSAY OF CK (CPK): CPT | Performed by: FAMILY MEDICINE

## 2018-01-26 RX ORDER — MELOXICAM 15 MG/1
15 TABLET ORAL
Qty: 30 TAB | Refills: 0 | Status: SHIPPED | OUTPATIENT
Start: 2018-01-26 | End: 2018-02-23 | Stop reason: ALTCHOICE

## 2018-01-26 RX ORDER — AMLODIPINE BESYLATE 10 MG/1
10 TABLET ORAL DAILY
Qty: 90 TAB | Refills: 1 | Status: SHIPPED | OUTPATIENT
Start: 2018-01-26 | End: 2018-08-08 | Stop reason: SDUPTHER

## 2018-01-26 RX ORDER — ROSUVASTATIN CALCIUM 20 MG/1
20 TABLET, COATED ORAL
Qty: 30 TAB | Refills: 5 | Status: SHIPPED | OUTPATIENT
Start: 2018-01-26 | End: 2018-02-23 | Stop reason: SDUPTHER

## 2018-01-26 RX ORDER — HYDROCHLOROTHIAZIDE 12.5 MG/1
12.5 TABLET ORAL DAILY
Qty: 90 TAB | Refills: 1 | Status: SHIPPED | OUTPATIENT
Start: 2018-01-26 | End: 2018-08-08

## 2018-01-26 NOTE — PROGRESS NOTES
Subjective:     Chief Complaint   Patient presents with    Groin Pain     pain goes to the lower back and down the leg x 2 weeks        She  is a 65 y.o. female who presents for evaluation of:  R Low back and R hip pain and right lower calf pain x 2 weeks. Pain has come and gone for years. No weakness or numbness. Feels like it is hard to walk. Worse with transitioning from sitting to standing. Seems to last about 1.5 hrs and then resolves spontaneously. Not using APAP. At last appt, we performed steroid injection into R knee. No falls. X-rays - 2017 - bilat hips show OA, L spine DDD, and calcified fibroid    Discussed pancreatitis hx. Chart reviewed. ? If this was the cause for setting off her DM. DM is controlled with Metformin and Glipizide. ROS  Gen - no fever/chills  Resp - no dyspnea or cough  CV - no chest pain or GILBERT  Rest per HPI    Past Medical History:   Diagnosis Date    Asthma     CAD (coronary artery disease)     high cholestrol    Diabetes ([facility] [address] [facility]     History of pancreatitis 2018    Hypertension     Mixed hyperlipidemia 10/28/2011    Thyroid disease     s/p partial thyroidectomy     Past Surgical History:   Procedure Laterality Date    HX GYN       x 2    HX OTHER SURGICAL      thyroidectomy - partial    [place] EGD INTRMURAL NEEDLE ASPIR/BIOP ALTERED ANATOMY  10/31/2011          Current Outpatient Prescriptions on File Prior to Visit   Medication Sig Dispense Refill    cyclobenzaprine (FLEXERIL) 5 mg tablet Take 1 Tab by mouth nightly as needed for Muscle Spasm(s). 30 Tab 0    glipiZIDE (GLUCOTROL) 5 mg tablet 1 po qday BEFORE breakfast IF blood sugar measures 140 or higher 30 Tab 1    valsartan (DIOVAN) 320 mg tablet Take 1 Tab by mouth daily. 90 Tab 1    metFORMIN (GLUCOPHAGE) 1,000 mg tablet Take 1 Tab by mouth two (2) times daily (with meals). 180 Tab 1    levothyroxine (SYNTHROID) 75 mcg tablet Take 1 Tab by mouth daily.  90 Tab 1    loratadine (CLARITIN) 10 mg tablet Take 1 Tab by mouth daily. For phlegm in throat 30 Tab 3    MULTIVITAMIN W-MINERALS/LUTEIN (CENTRUM SILVER PO) Take  by mouth. Takes one po daily       OTHER Salamol CFC free inhaler as needed. Medication from [facility].  ALBUTEROL SULFATE (VENTOLIN PO) Take  by mouth as needed. No current facility-administered medications on file prior to visit. Objective:     Vitals:    01/26/18 1049   BP: 157/79   Pulse: 93   Temp: 98.3 °F (36.8 °C)   TempSrc: Oral   Weight: 154 lb (69.9 kg)       Physical Examination:  General appearance - alert, well appearing, and in no distress  Eyes -sclera anicteric  Neck - supple, no significant adenopathy, no thyromegaly, no bruits  Chest - clear to auscultation, no wheezes, rales or rhonchi, symmetric air entry  Heart - normal rate, regular rhythm, normal S1, S2, no murmurs, rubs, clicks or gallops  Neurological - alert, oriented, no focal findings or movement disorder noted  Back - mild ttp over R lumbar paraspinals  Extr- no edema, nml strength and sensation, no pain with int/ext rotation of hips    Assessment/ Plan:   Diagnoses and all orders for this visit:    1. Primary osteoarthritis of both hips  2. DDD (degenerative disc disease), lumbar  - Encouraged APAP use. Will use Mobic acutely x 3 days. Given home stretches to try  -     meloxicam (MOBIC) 15 mg tablet; Take 1 Tab by mouth daily (after breakfast). Take x 3 days and then stop. May use daily after this as needed. 3. Type 2 diabetes mellitus with nephropathy ([address] - well controlled, on Metformin and Glipizide  -     AMB POC GLUCOSE BLOOD, BY GLUCOSE MONITORING DEVICE  -     LIPID PANEL; Future    4. Essential hypertension - BP high today, recheck at next appt and refilling meds  -     amLODIPine (NORVASC) 10 mg tablet; Take 1 Tab by mouth daily. -     hydroCHLOROthiazide (HYDRODIURIL) 12.5 mg tablet; Take 1 Tab by mouth daily.     5. Mixed hyperlipidemia - LDL high at last check given hx  -     LIPID PANEL; Future  -     rosuvastatin (CRESTOR) 20 mg tablet; Take 1 Tab by mouth nightly. Indications: atherosclerotic cardiovascular disease    6. History of pancreatitis - clarified hx and dx  Comments:  2011 - initially concern for pancreatic ca but lipase resolved to nml, pancreatic bx was neg,  was neg    7. Coronary artery disease due to lipid rich plaque - last  in setting of atherosclerosis of abd aorta and well controlled DM so increasing Crestor dose  Comments:  atherosclerosis of abd aorta noted on CT abd/pelv in 2011  Orders:  -     LIPID PANEL; Future  -     rosuvastatin (CRESTOR) 20 mg tablet; Take 1 Tab by mouth nightly. Indications: atherosclerotic cardiovascular disease    8. Myalgia - r/o relation to statin since increasing dose today  -     CK; Future     I have discussed the diagnosis with the patient and the intended plan as seen in the above orders. The patient has received an after-visit summary and questions were answered concerning future plans. I have discussed medication side effects and warnings with the patient as well. The patient verbalizes understanding and agreement with the plan. Follow-up Disposition:  Return in about 4 weeks (around 2/23/2018), or if symptoms worsen or fail to improve, for Regular Follow up.

## 2018-01-26 NOTE — PROGRESS NOTES
Coordination of Care  1. Have you been to the ER, urgent care clinic since your last visit? Hospitalized since your last visit? No    2. Have you seen or consulted any other health care providers outside of the [address] [place] [address] since your last visit? Include any pap smears or colon screening. No    Medications  Does the patient need refills?  YES    Learning Assessment Complete? yes  Results for orders placed or performed in visit on 01/26/18   AMB POC GLUCOSE BLOOD, BY GLUCOSE MONITORING DEVICE   Result Value Ref Range    Glucose POC F 104 mg/dL

## 2018-01-26 NOTE — MR AVS SNAPSHOT
[address] [address] 
[phone] Patient: Nicole Sanches MRN: [id number] Visit Information Date & Time Provider Department Dept. Phone Encounter #  
 1/26/2018 10:50 AM Lindsay Christian, [address] [phone] [id number] Follow-up Instructions Return in about 4 weeks (around 2/23/2018), or if symptoms worsen or fail to improve, for Regular Follow up. Upcoming Health Maintenance Date Due  
 FOOT EXAM Q1 9/4/1950 EYE EXAM RETINAL OR DILATED Q1 9/4/1949 DTaP/Tdap/Td series (1 - Tdap) 9/4/1960 ZOSTER VACCINE AGE 60> 7/4/1999 GLAUCOMA SCREENING Q2Y 9/4/2004 OSTEOPOROSIS SCREENING (DEXA) 9/4/2004 MEDICARE YEARLY EXAM 9/4/2004 HEMOGLOBIN A1C Q6M 6/29/2018 Pneumococcal 65+ Low/Medium Risk (2 of 2 - PCV13) 9/13/2018 MICROALBUMIN Q1 9/13/2018 LIPID PANEL Q1 9/13/2018 Allergies as of 1/26/2018  Review Complete On: 1/26/2018 By: Lindsay Christian MD  
  
 Severity Noted Reaction Type Reactions Aspirin  10/28/2011    Other (comments) Pt states she is asthmatic and \"knows not to take ASA\" Aspirin  11/04/2011    Unknown (comments) Unsure of reaction Codeine  12/29/2017    Itching Also nausea, diarrhea Shellfish Containing Products  11/04/2011    Other (comments) Asthma attack Shellfish Derived  04/25/2016    Hives Current Immunizations  Never Reviewed Name Date Influenza Vaccine (Quad) PF 11/10/2017 Pneumococcal Polysaccharide (PPSV-23) 9/13/2017 Not reviewed this visit You Were Diagnosed With   
  
 Codes Comments Primary osteoarthritis of both hips    -  Primary ICD-10-CM: M16.0 ICD-9-CM: 715.15   
 DDD (degenerative disc disease), lumbar     ICD-10-CM: M51.36 
ICD-9-CM: 722.52  Type 2 diabetes mellitus with nephropathy (HCC)     ICD-10-CM: E11.21 
ICD-9-CM: 250.40, 583.81   
 Essential hypertension     ICD-10-CM: I10 
ICD-9-CM: 401.9 Mixed hyperlipidemia     ICD-10-CM: E78.2 ICD-9-CM: 272.2 History of pancreatitis     ICD-10-CM: Z87.19 ICD-9-CM: V12.79 2011 - initially concern for pancreatic ca but lipase resolved to nml, pancreatic bx was neg,  was neg Coronary artery disease due to lipid rich plaque     ICD-10-CM: I25.10, I25.83 ICD-9-CM: 414.00, 414.3 atherosclerosis of abd aorta noted on CT abd/pelv in 2011 Myalgia     ICD-10-CM: M79.1 ICD-9-CM: 729.1 Vitals BP Pulse Temp Weight(growth percentile) BMI OB Status 157/79 (BP 1 Location: Left arm, BP Patient Position: Sitting) 93 98.3 °F (36.8 °C) (Oral) 154 lb (69.9 kg) 30.58 kg/m2 Postmenopausal  
 Smoking Status Never Smoker Vitals History BMI and BSA Data Body Mass Index Body Surface Area 30.58 kg/m 2 1.71 m 2 Preferred Pharmacy Pharmacy Name Phone Andie Sofia [address] [facility], [address] [place] [address] [phone] Your Updated Medication List  
  
   
This list is accurate as of: 1/26/18 11:38 AM.  Always use your most recent med list. amLODIPine 10 mg tablet Commonly known as:  Chelsea Arreaga Take 1 Tab by mouth daily. CENTRUM SILVER PO Take  by mouth. Takes one po daily  
  
 cyclobenzaprine 5 mg tablet Commonly known as:  FLEXERIL Take 1 Tab by mouth nightly as needed for Muscle Spasm(s). glipiZIDE 5 mg tablet Commonly known as:  Arnold Hodgkin 1 po qday BEFORE breakfast IF blood sugar measures 140 or higher  
  
 hydroCHLOROthiazide 12.5 mg tablet Commonly known as:  HYDRODIURIL Take 1 Tab by mouth daily. levothyroxine 75 mcg tablet Commonly known as:  SYNTHROID Take 1 Tab by mouth daily. loratadine 10 mg tablet Commonly known as:  Marck Gonzalezrick Take 1 Tab by mouth daily. For phlegm in throat  
  
 meloxicam 15 mg tablet Commonly known as:  MOBIC  
 Take 1 Tab by mouth daily (after breakfast). Take x 3 days and then stop. May use daily after this as needed. metFORMIN 1,000 mg tablet Commonly known as:  GLUCOPHAGE Take 1 Tab by mouth two (2) times daily (with meals). OTHER Salamol CFC free inhaler as needed. Medication from [facility]. rosuvastatin 20 mg tablet Commonly known as:  CRESTOR Take 1 Tab by mouth nightly. Indications: atherosclerotic cardiovascular disease  
  
 valsartan 320 mg tablet Commonly known as:  DIOVAN Take 1 Tab by mouth daily. VENTOLIN PO Take  by mouth as needed. Prescriptions Sent to Pharmacy Refills  
 rosuvastatin (CRESTOR) 20 mg tablet 5 Sig: Take 1 Tab by mouth nightly. Indications: atherosclerotic cardiovascular disease Class: Normal  
 Pharmacy: [address] Ph #: [phone] Route: Oral  
 meloxicam (MOBIC) 15 mg tablet 0 Sig: Take 1 Tab by mouth daily (after breakfast). Take x 3 days and then stop. May use daily after this as needed. Class: Normal  
 Pharmacy: [address] Ph #: [phone] Route: Oral  
 amLODIPine (NORVASC) 10 mg tablet 1 Sig: Take 1 Tab by mouth daily. Class: Normal  
 Pharmacy: [address] Ph #: [phone] Route: Oral  
 hydroCHLOROthiazide (HYDRODIURIL) 12.5 mg tablet 1 Sig: Take 1 Tab by mouth daily. Class: Normal  
 Pharmacy: [address] Ph #: [phone] Route: Oral  
  
We Performed the Following AMB POC GLUCOSE BLOOD, BY GLUCOSE MONITORING DEVICE [17355 CPT(R)] Follow-up Instructions Return in about 4 weeks (around 2/23/2018), or if symptoms worsen or fail to improve, for Regular Follow up. To-Do List   
 01/26/2018 Lab:  CK   
  
 01/26/2018   Lab:  LIPID PANEL   
  
  
 Introducing [facility]! [employer] introduces [facility] patient portal. Now you can access parts of your medical record, email your doctor's office, and request medication refills online. 1. In your internet browser, go to [email or web address] 2. Click on the First Time User? Click Here link in the Sign In box. You will see the New Member Sign Up page. 3. Enter your [facility] Access Code exactly as it appears below. You will not need to use this code after youve completed the sign-up process. If you do not sign up before the expiration date, you must request a new code. · [facility] Access Code: [email or web address] Expires: 3/29/2018 11:31 AM 
 
4. Enter the last four digits of your Social Security Number (xxxx) and Date of Birth (mm/dd/yyyy) as indicated and click Submit. You will be taken to the next sign-up page. 5. Create a [facility] ID. This will be your [facility] login ID and cannot be changed, so think of one that is secure and easy to remember. 6. Create a [facility] password. You can change your password at any time. 7. Enter your Password Reset Question and Answer. This can be used at a later time if you forget your password. 8. Enter your e-mail address. You will receive e-mail notification when new information is available in [address] 9. Click Sign Up. You can now view and download portions of your medical record. 10. Click the Download Summary menu link to download a portable copy of your medical information. If you have questions, please visit the Frequently Asked Questions section of the [facility] website. Remember, [facility] is NOT to be used for urgent needs. For medical emergencies, dial 911. Now available from your iPhone and Android! Please provide this summary of care documentation to your next provider. Your primary care clinician is listed as Girish Butt. If you have any questions after today's visit, please call [phone].

## 2018-02-23 ENCOUNTER — OFFICE VISIT (OUTPATIENT)
Dept: FAMILY MEDICINE CLINIC | Age: 78
End: 2018-02-23

## 2018-02-23 VITALS
WEIGHT: 153 LBS | SYSTOLIC BLOOD PRESSURE: 152 MMHG | BODY MASS INDEX: 30.39 KG/M2 | HEART RATE: 106 BPM | TEMPERATURE: 98.6 F | OXYGEN SATURATION: 99 % | DIASTOLIC BLOOD PRESSURE: 82 MMHG

## 2018-02-23 DIAGNOSIS — E11.21 TYPE 2 DIABETES MELLITUS WITH NEPHROPATHY (HCC): Primary | ICD-10-CM

## 2018-02-23 DIAGNOSIS — I10 HTN, GOAL BELOW 140/90: ICD-10-CM

## 2018-02-23 DIAGNOSIS — E78.2 MIXED HYPERLIPIDEMIA: ICD-10-CM

## 2018-02-23 DIAGNOSIS — J45.901 ASTHMA WITH ACUTE EXACERBATION, UNSPECIFIED ASTHMA SEVERITY, UNSPECIFIED WHETHER PERSISTENT: ICD-10-CM

## 2018-02-23 DIAGNOSIS — I25.10 CORONARY ARTERY DISEASE DUE TO LIPID RICH PLAQUE: ICD-10-CM

## 2018-02-23 DIAGNOSIS — I25.83 CORONARY ARTERY DISEASE DUE TO LIPID RICH PLAQUE: ICD-10-CM

## 2018-02-23 LAB — GLUCOSE POC: NORMAL MG/DL

## 2018-02-23 RX ORDER — FLUTICASONE PROPIONATE AND SALMETEROL 250; 50 UG/1; UG/1
1 POWDER RESPIRATORY (INHALATION) EVERY 12 HOURS
Qty: 1 INHALER | Refills: 1 | Status: SHIPPED | OUTPATIENT
Start: 2018-02-23 | End: 2022-03-07

## 2018-02-23 RX ORDER — ROSUVASTATIN CALCIUM 20 MG/1
20 TABLET, COATED ORAL
Qty: 30 TAB | Refills: 5 | Status: SHIPPED | OUTPATIENT
Start: 2018-02-23 | End: 2018-08-08 | Stop reason: SDUPTHER

## 2018-02-23 RX ORDER — ALBUTEROL SULFATE 90 UG/1
1 AEROSOL, METERED RESPIRATORY (INHALATION)
Qty: 1 INHALER | COMMUNITY
Start: 2018-02-23 | End: 2018-08-08 | Stop reason: SDUPTHER

## 2018-02-23 RX ORDER — PREDNISONE 20 MG/1
20 TABLET ORAL
Qty: 5 TAB | Refills: 0 | Status: SHIPPED | OUTPATIENT
Start: 2018-02-23 | End: 2018-08-08

## 2018-02-23 NOTE — PROGRESS NOTES
[facility] pharmacy handout given and explained to patient. Patient plans to  at [facility] location. F/S faxed to [facility]. Requested pt pickup on Monday from [facility].

## 2018-02-23 NOTE — MR AVS SNAPSHOT
[address] [address] 
[phone] Patient: Melynda Merlin MRN: [id number] Visit Information Date & Time Provider Department Dept. Phone Encounter #  
 2/23/2018 10:50 AM Arianna Cortez, [address] [facility] [address] [phone] [id number] Upcoming Health Maintenance Date Due  
 FOOT EXAM Q1 9/4/1950 EYE EXAM RETINAL OR DILATED Q1 9/4/1949 DTaP/Tdap/Td series (1 - Tdap) 9/4/1960 ZOSTER VACCINE AGE 60> 7/4/1999 GLAUCOMA SCREENING Q2Y 9/4/2004 OSTEOPOROSIS SCREENING (DEXA) 9/4/2004 MEDICARE YEARLY EXAM 9/4/2004 HEMOGLOBIN A1C Q6M 6/29/2018 Pneumococcal 65+ Low/Medium Risk (2 of 2 - PCV13) 9/13/2018 MICROALBUMIN Q1 9/13/2018 LIPID PANEL Q1 1/26/2019 Allergies as of 2/23/2018  Review Complete On: 1/26/2018 By: Mayela Crowe MD  
  
 Severity Noted Reaction Type Reactions Aspirin  10/28/2011    Other (comments) Pt states she is asthmatic and \"knows not to take ASA\" Aspirin  11/04/2011    Unknown (comments) Unsure of reaction Codeine  12/29/2017    Itching Also nausea, diarrhea Shellfish Containing Products  11/04/2011    Other (comments) Asthma attack Shellfish Derived  04/25/2016    Hives Current Immunizations  Never Reviewed Name Date Influenza Vaccine (Quad) PF 11/10/2017 Pneumococcal Polysaccharide (PPSV-23) 9/13/2017 Not reviewed this visit You Were Diagnosed With   
  
 Codes Comments Type 2 diabetes mellitus with nephropathy ([facility] [address] [address]    -  Primary ICD-10-CM: E11.21 
ICD-9-CM: 250.40, 583.81 Vitals BP Pulse Temp Weight(growth percentile) SpO2 BMI  
 152/82 (BP 1 Location: Left arm, BP Patient Position: Sitting) (!) 106 98.6 °F (37 °C) (Oral) 153 lb (69.4 kg) 99% 30.39 kg/m2 OB Status Smoking Status Postmenopausal Never Smoker Vitals History BMI and BSA Data Body Mass Index Body Surface Area  
 30.39 kg/m 2 1.71 m 2 Preferred Pharmacy Pharmacy Name Phone [address]Bianca Jansen [address] [place] [address] [phone] Your Updated Medication List  
  
   
This list is accurate as of 2/23/18 11:27 AM.  Always use your most recent med list. amLODIPine 10 mg tablet Commonly known as:  Krista Danial Take 1 Tab by mouth daily. CENTRUM SILVER PO Take  by mouth. Takes one po daily  
  
 fluticasone-salmeterol 250-50 mcg/dose diskus inhaler Commonly known as:  ADVAIR Take 1 Puff by inhalation every twelve (12) hours. glipiZIDE 5 mg tablet Commonly known as:  Ritesh Gianotti 1 po qday BEFORE breakfast IF blood sugar measures 140 or higher  
  
 hydroCHLOROthiazide 12.5 mg tablet Commonly known as:  HYDRODIURIL Take 1 Tab by mouth daily. levothyroxine 75 mcg tablet Commonly known as:  SYNTHROID Take 1 Tab by mouth daily. loratadine 10 mg tablet Commonly known as:  Beverley Calvo Take 1 Tab by mouth daily. For phlegm in throat  
  
 metFORMIN 1,000 mg tablet Commonly known as:  GLUCOPHAGE Take 1 Tab by mouth two (2) times daily (with meals). OTHER Salamol CFC free inhaler as needed. Medication from [facility]. predniSONE 20 mg tablet Commonly known as:  Marine Sandor Take 1 Tab by mouth daily (with breakfast). rosuvastatin 20 mg tablet Commonly known as:  CRESTOR Take 1 Tab by mouth nightly. Indications: atherosclerotic cardiovascular disease  
  
 valsartan 320 mg tablet Commonly known as:  DIOVAN Take 1 Tab by mouth daily. VENTOLIN PO Take  by mouth as needed. Prescriptions Sent to Pharmacy Refills  
 predniSONE (DELTASONE) 20 mg tablet 0 Sig: Take 1 Tab by mouth daily (with breakfast). Class: Normal  
 Pharmacy: Ava Cody [address] [place] [address], [address] [place] [address] #: [phone]  Route: Oral  
 fluticasone-salmeterol (ADVAIR) 250-50 mcg/dose diskus inhaler 1 Sig: Take 1 Puff by inhalation every twelve (12) hours. Class: Normal  
 Pharmacy: [address] [place] [address] #: [phone] Route: Inhalation We Performed the Following AMB POC GLUCOSE BLOOD, BY GLUCOSE MONITORING DEVICE [71526 CPT(R)] Introducing [address]! Elina Arellano introduces [employer] patient portal. Now you can access parts of your medical record, email your doctor's office, and request medication refills online. 1. In your internet browser, go to [email or web address] 2. Click on the First Time User? Click Here link in the Sign In box. You will see the New Member Sign Up page. 3. Enter your [employer] Access Code exactly as it appears below. You will not need to use this code after youve completed the sign-up process. If you do not sign up before the expiration date, you must request a new code. · [employer] Access Code: [email or web address] Expires: 3/29/2018 11:31 AM 
 
4. Enter the last four digits of your Social Security Number (xxxx) and Date of Birth (mm/dd/yyyy) as indicated and click Submit. You will be taken to the next sign-up page. 5. Create a [employer] ID. This will be your [employer] login ID and cannot be changed, so think of one that is secure and easy to remember. 6. Create a [employer] password. You can change your password at any time. 7. Enter your Password Reset Question and Answer. This can be used at a later time if you forget your password. 8. Enter your e-mail address. You will receive e-mail notification when new information is available in [address] 9. Click Sign Up. You can now view and download portions of your medical record. 10. Click the Download Summary menu link to download a portable copy of your medical information.  
 
If you have questions, please visit the Frequently Asked Questions section of the [employer]. Remember, [employer] is NOT to be used for urgent needs. For medical emergencies, dial 911. Now available from your iPhone and Android! Please provide this summary of care documentation to your next provider. Your primary care clinician is listed as Jacque Campbell. Monty Bishop. If you have any questions after today's visit, please call [phone].

## 2018-02-23 NOTE — PROGRESS NOTES
Coordination of Care  1. Have you been to the ER, urgent care clinic since your last visit? Hospitalized since your last visit? No    2. Have you seen or consulted any other health care providers outside of the [address] [place] [address] since your last visit? Include any pap smears or colon screening. No    Does the patient need refills?  YES    Learning Assessment Complete? yes  Results for orders placed or performed in visit on 02/23/18   AMB POC GLUCOSE BLOOD, BY GLUCOSE MONITORING DEVICE   Result Value Ref Range    Glucose POC  mg/dL

## 2018-02-23 NOTE — PROGRESS NOTES
Subjective: Susan Chowdary is a 65 y.o. female seen for follow up of diabetes. She also has hypertension , CAD and hyperlipidemia. Just had crestor increased. Diabetic Review of Systems - medication compliance: compliant all of the time, home glucose monitoring: is performed regularly. Glucoses fasting low 100s. Bps at home 140/70s or less. Other symptoms and concerns: reports h/o asthma with exaccerbation in [place] several times a year, last in may. When she gets sx, she starts advair in addition to the ventolin. Reports a cough for the past week with wheezing, needs to use ventolin tid. Normally would have also started advair at this time, has the discus with her, but it . The cough is productive of clear sputum, and she has had no fever. .    Patient Active Problem List   Diagnosis Code    Hypokalemia E87.6    DM (diabetes mellitus) ([facility] E11.9    HTN (hypertension) I10    Acquired hypothyroidism E03.9    Asthma J45.909    Mixed hyperlipidemia E78.2    Primary osteoarthritis of both hips M16.0    Type 2 diabetes mellitus with nephropathy ([facility] [address] [facility] E11.21    History of pancreatitis Z87.19     Social History   Substance Use Topics    Smoking status: Never Smoker    Smokeless tobacco: Never Used    Alcohol use Yes      Comment: 1 glass of wine on Sundays        Lab Results  Component Value Date/Time   Cholesterol, total 232 (H) 2018 11:50 AM   HDL Cholesterol 66 2018 11:50 AM   LDL, calculated 140.6 (H) 2018 11:50 AM   Triglyceride 127 2018 11:50 AM   CHOL/HDL Ratio 3.5 2018 11:50 AM     Lab Results  Component Value Date/Time   GFR est non-AA 52 (L) 2017 11:42 AM   GFR est AA >60 2017 11:42 AM   Creatinine 1.03 (H) 2017 11:42 AM   BUN 11 2017 11:42 AM   Sodium 142 2017 11:42 AM   Potassium 4.0 2017 11:42 AM   Chloride 101 2017 11:42 AM   CO2 32 2017 11:42 AM   Magnesium 1.5 (L) 10/29/2011 06:45 AM        Lab Results   Component Value Date/Time    Hemoglobin A1c 6.4 (H) 12/29/2017 11:46 AM    Hemoglobin A1c 6.3 09/13/2017 03:47 PM    Hemoglobin A1c 6.0 06/05/2017 11:42 AM         Objective:     Vitals 2/23/2018 1/26/2018 12/29/2017 12/29/2017 12/29/2017 11/10/2017 9/13/2017   Blood Pressure 152/82 157/79 134/82 153/72 - 147/77 166/83   Pulse 106 93 - 97 - 93 90   Temp 98.6 98.3 - 98.5 - 98.4 -   Resp - - - - - - -   Height - - - 4' 11.5\" - 4' 11.5\" -   Weight 153 lb 154 lb - 161 lb - 162 lb -   SpO2 99 - - - - - -   BSA - - - 1.75 m2 - 1.76 m2 -   BMI - - - 31.97 kg/m2 - 32.17 kg/m2 -   BP comment - - - - home blood pressure measurement taken by patient this heavenfaye - -   Some recent data might be hidden     Appearance: alert, well appearing, and in no distress. Exam: heart sounds normal rate, regular rhythm, normal S1, S2, no murmurs, rubs, clicks or gallops, chest clear  Lab review:    Assessment/Plan:     diabetes well controlled, hypertension stable, hyperlipidemia needs further observation. Asthma exaccerbation, with nl lung exam currently. Will start a small dose of pred for 3 days, at which time she will be able to get the advair. Can stop pred then. Discussed that glucoses will be higher while on pred and should check glucoses regularly       ICD-10-CM ICD-9-CM    1. Type 2 diabetes mellitus with nephropathy (HCC) E11.21 250.40 AMB POC GLUCOSE BLOOD, BY GLUCOSE MONITORING DEVICE     583.81    2. Mixed hyperlipidemia E78.2 272.2 rosuvastatin (CRESTOR) 20 mg tablet   3.  Coronary artery disease due to lipid rich plaque I25.10 414.00 rosuvastatin (CRESTOR) 20 mg tablet    I25.83 414.3     atherosclerosis of abd aorta noted on CT abd/pelv in 2011

## 2018-05-15 DIAGNOSIS — I10 ESSENTIAL HYPERTENSION: ICD-10-CM

## 2018-05-15 RX ORDER — VALSARTAN 320 MG/1
TABLET ORAL
Qty: 90 TAB | Refills: 0 | Status: SHIPPED | OUTPATIENT
Start: 2018-05-15 | End: 2018-05-17 | Stop reason: SDUPTHER

## 2018-05-17 RX ORDER — VALSARTAN 320 MG/1
TABLET ORAL
Qty: 90 TAB | Refills: 0 | Status: SHIPPED | OUTPATIENT
Start: 2018-05-17 | End: 2018-08-08 | Stop reason: SDUPTHER

## 2018-06-27 DIAGNOSIS — E03.9 HYPOTHYROIDISM: ICD-10-CM

## 2018-06-27 DIAGNOSIS — E11.9 TYPE 2 DIABETES MELLITUS WITHOUT COMPLICATION, WITHOUT LONG-TERM CURRENT USE OF INSULIN (HCC): ICD-10-CM

## 2018-06-28 RX ORDER — LEVOTHYROXINE SODIUM 75 UG/1
TABLET ORAL
Qty: 30 TAB | Refills: 4 | Status: SHIPPED | OUTPATIENT
Start: 2018-06-28 | End: 2018-11-26 | Stop reason: SDUPTHER

## 2018-06-28 RX ORDER — METFORMIN HYDROCHLORIDE 1000 MG/1
TABLET ORAL
Qty: 180 TAB | Refills: 0 | Status: SHIPPED | OUTPATIENT
Start: 2018-06-28 | End: 2018-09-24 | Stop reason: SDUPTHER

## 2018-08-08 ENCOUNTER — OFFICE VISIT (OUTPATIENT)
Dept: FAMILY MEDICINE CLINIC | Age: 78
End: 2018-08-08

## 2018-08-08 VITALS
HEART RATE: 88 BPM | WEIGHT: 155 LBS | HEIGHT: 59 IN | OXYGEN SATURATION: 97 % | BODY MASS INDEX: 31.25 KG/M2 | DIASTOLIC BLOOD PRESSURE: 61 MMHG | TEMPERATURE: 98.7 F | SYSTOLIC BLOOD PRESSURE: 133 MMHG

## 2018-08-08 DIAGNOSIS — M16.0 PRIMARY OSTEOARTHRITIS OF BOTH HIPS: ICD-10-CM

## 2018-08-08 DIAGNOSIS — E78.2 MIXED HYPERLIPIDEMIA: ICD-10-CM

## 2018-08-08 DIAGNOSIS — I10 ESSENTIAL HYPERTENSION: ICD-10-CM

## 2018-08-08 DIAGNOSIS — M51.36 DDD (DEGENERATIVE DISC DISEASE), LUMBAR: ICD-10-CM

## 2018-08-08 DIAGNOSIS — I25.83 CORONARY ARTERY DISEASE DUE TO LIPID RICH PLAQUE: ICD-10-CM

## 2018-08-08 DIAGNOSIS — I25.10 CORONARY ARTERY DISEASE DUE TO LIPID RICH PLAQUE: ICD-10-CM

## 2018-08-08 DIAGNOSIS — J45.20 MILD INTERMITTENT ASTHMA WITHOUT COMPLICATION: ICD-10-CM

## 2018-08-08 DIAGNOSIS — E11.9 TYPE 2 DIABETES MELLITUS WITHOUT COMPLICATION, WITHOUT LONG-TERM CURRENT USE OF INSULIN (HCC): Primary | ICD-10-CM

## 2018-08-08 RX ORDER — ROSUVASTATIN CALCIUM 20 MG/1
20 TABLET, COATED ORAL
Qty: 30 TAB | Refills: 5 | Status: SHIPPED | OUTPATIENT
Start: 2018-08-08 | End: 2018-11-30 | Stop reason: SDUPTHER

## 2018-08-08 RX ORDER — VALSARTAN 320 MG/1
TABLET ORAL
Qty: 90 TAB | Refills: 1 | Status: SHIPPED | OUTPATIENT
Start: 2018-08-08 | End: 2018-08-15 | Stop reason: RX

## 2018-08-08 RX ORDER — HYDROCHLOROTHIAZIDE 12.5 MG/1
12.5 TABLET ORAL DAILY
Qty: 90 TAB | Refills: 1 | Status: SHIPPED | OUTPATIENT
Start: 2018-08-08 | End: 2018-09-06

## 2018-08-08 RX ORDER — ALBUTEROL SULFATE 90 UG/1
1 AEROSOL, METERED RESPIRATORY (INHALATION)
Qty: 1 INHALER | Refills: 1 | Status: SHIPPED | OUTPATIENT
Start: 2018-08-08 | End: 2018-08-08 | Stop reason: SDUPTHER

## 2018-08-08 RX ORDER — HYDROCHLOROTHIAZIDE 12.5 MG/1
12.5 CAPSULE ORAL DAILY
Qty: 90 CAP | Refills: 1 | Status: SHIPPED | OUTPATIENT
Start: 2018-08-08 | End: 2018-08-08 | Stop reason: CLARIF

## 2018-08-08 RX ORDER — MELOXICAM 15 MG/1
15 TABLET ORAL
Qty: 30 TAB | Refills: 0 | Status: SHIPPED | OUTPATIENT
Start: 2018-08-08 | End: 2020-11-23

## 2018-08-08 RX ORDER — ALBUTEROL SULFATE 90 UG/1
1 AEROSOL, METERED RESPIRATORY (INHALATION)
Qty: 1 INHALER | Refills: 1 | Status: SHIPPED | OUTPATIENT
Start: 2018-08-08 | End: 2020-12-07 | Stop reason: SDUPTHER

## 2018-08-08 RX ORDER — AMLODIPINE BESYLATE 10 MG/1
10 TABLET ORAL DAILY
Qty: 90 TAB | Refills: 1 | Status: SHIPPED | OUTPATIENT
Start: 2018-08-08 | End: 2018-11-30 | Stop reason: SDUPTHER

## 2018-08-08 NOTE — PROGRESS NOTES
Coordination of Care  1. Have you been to the ER, urgent care clinic since your last visit? Hospitalized since your last visit? No    2. Have you seen or consulted any other health care providers outside of the [address] [place] [address] since your last visit? Include any pap smears or colon screening. No    Does the patient need refills? YES    Learning Assessment Complete?  yes  Depression Screening complete in the past 12 months? yes

## 2018-08-08 NOTE — PROGRESS NOTES
[address] - pt given appointment 8/30 at 11.15 am.  Gave pt referral form, asked that she take it with $15 fee to her appointment. [facility] - pt given address and hours of the pharmacy. She will  from [facility] location. Pt's SJO f/s faxed to [facility], it expires on 9/20/18.   Asked pt to make appt to return before that time to renew so she can continue to get Rx's from [facility]

## 2018-08-08 NOTE — MR AVS SNAPSHOT
[address] [address] 
[phone] Patient: Ricardo Salazar MRN: [id number] ZTC:[birth year] Visit Information Date & Time Provider Department Dept. Phone Encounter #  
 8/8/2018  3:35 PM Joao Leija, [facility]  Follow-up Instructions Return in about 4 weeks (around 9/5/2018). Upcoming Health Maintenance Date Due  
 FOOT EXAM Q1 9/4/1950 EYE EXAM RETINAL OR DILATED Q1 9/4/1950 DTaP/Tdap/Td series (1 - Tdap) 9/4/1960 ZOSTER VACCINE AGE 60> 7/4/1999 GLAUCOMA SCREENING Q2Y 9/4/2004 Bone Densitometry (Dexa) Screening 9/4/2004 HEMOGLOBIN A1C Q6M 6/29/2018 Influenza Age 5 to Adult 8/1/2018 Pneumococcal 65+ Low/Medium Risk (2 of 2 - PCV13) 9/13/2018 MICROALBUMIN Q1 9/13/2018 LIPID PANEL Q1 1/26/2019 Allergies as of 8/8/2018  Review Complete On: 8/8/2018 By: Joao Leija NP Severity Noted Reaction Type Reactions Aspirin  10/28/2011    Other (comments) Pt states she is asthmatic and \"knows not to take ASA\" Aspirin  11/04/2011    Unknown (comments) Unsure of reaction Codeine  12/29/2017    Itching Also nausea, diarrhea Shellfish Containing Products  11/04/2011    Other (comments) Asthma attack Shellfish Derived  04/25/2016    Hives Current Immunizations  Never Reviewed Name Date Influenza Vaccine (Quad) PF 11/10/2017 Pneumococcal Polysaccharide (PPSV-23) 9/13/2017 Not reviewed this visit You Were Diagnosed With   
  
 Codes Comments Type 2 diabetes mellitus without complication, without long-term current use of insulin (HCC)    -  Primary ICD-10-CM: E11.9 ICD-9-CM: 250.00 Essential hypertension     ICD-10-CM: I10 
ICD-9-CM: 401.9 Primary osteoarthritis of both hips     ICD-10-CM: M16.0 ICD-9-CM: 715.15 Mixed hyperlipidemia     ICD-10-CM: E78.2 ICD-9-CM: 272.2 Coronary artery disease due to lipid rich plaque     ICD-10-CM: I25.10, I25.83 ICD-9-CM: 414.00, 414.3 atherosclerosis of abd aorta noted on CT abd/pelv in 2011 DDD (degenerative disc disease), lumbar     ICD-10-CM: M51.36 
ICD-9-CM: 722.52 Vitals BP Pulse Temp Height(growth percentile) Weight(growth percentile) SpO2  
 133/61 (BP 1 Location: Left arm) 88 98.7 °F (37.1 °C) (Oral) 4' 11.49\" (1.511 m) 155 lb (70.3 kg) 97% BMI OB Status Smoking Status 30.79 kg/m2 Postmenopausal Never Smoker Vitals History BMI and BSA Data Body Mass Index Body Surface Area 30.79 kg/m 2 1.72 m 2 Preferred Pharmacy Pharmacy Name Phone [facility] [address] [place] [address] [phone] Your Updated Medication List  
  
   
This list is accurate as of 8/8/18  4:48 PM.  Always use your most recent med list.  
  
  
  
  
 albuterol 90 mcg/actuation inhaler Commonly known as:  PROVENTIL HFA, VENTOLIN HFA, PROAIR HFA Take 1 Puff by inhalation every four (4) hours as needed for Wheezing. amLODIPine 10 mg tablet Commonly known as:  Brantley Evan Take 1 Tab by mouth daily. CENTRUM SILVER PO Take  by mouth. Takes one po daily  
  
 fluticasone-salmeterol 250-50 mcg/dose diskus inhaler Commonly known as:  ADVAIR Take 1 Puff by inhalation every twelve (12) hours. glipiZIDE 5 mg tablet Commonly known as:  Sylwia Factor 1 po qday BEFORE breakfast IF blood sugar measures 140 or higher  
  
 hydroCHLOROthiazide 12.5 mg tablet Commonly known as:  HYDRODIURIL Take 1 Tab by mouth daily. levothyroxine 75 mcg tablet Commonly known as:  SYNTHROID  
TAKE ONE TABLET BY MOUTH DAILY  
  
 loratadine 10 mg tablet Commonly known as:  Hall Petri Take 1 Tab by mouth daily. For phlegm in throat  
  
 meloxicam 15 mg tablet Commonly known as:  MOBIC Take 1 Tab by mouth daily (after breakfast). Take x 3 days and then stop. May use daily after this as needed. metFORMIN 1,000 mg tablet Commonly known as:  GLUCOPHAGE  
TAKE ONE TABLET BY MOUTH TWICE A DAY WITH MEALS  
  
 OTHER Salamol CFC free inhaler as needed. Medication from [facility]. rosuvastatin 20 mg tablet Commonly known as:  CRESTOR Take 1 Tab by mouth nightly. Indications: atherosclerotic cardiovascular disease  
  
 valsartan 320 mg tablet Commonly known as:  DIOVAN  
TAKE ONE TABLET BY MOUTH DAILY Prescriptions Sent to Pharmacy Refills  
 valsartan (DIOVAN) 320 mg tablet 1 Sig: TAKE ONE TABLET BY MOUTH DAILY Class: Normal  
 Pharmacy: [address] Ph #: [phone]  
 amLODIPine (NORVASC) 10 mg tablet 1 Sig: Take 1 Tab by mouth daily. Class: Normal  
 Pharmacy: [address] Ph #: [phone] Route: Oral  
 hydroCHLOROthiazide (HYDRODIURIL) 12.5 mg tablet 1 Sig: Take 1 Tab by mouth daily. Class: Normal  
 Pharmacy: [address] Ph #: [phone] Route: Oral  
 albuterol (PROVENTIL HFA, VENTOLIN HFA, PROAIR HFA) 90 mcg/actuation inhaler 1 Sig: Take 1 Puff by inhalation every four (4) hours as needed for Wheezing. Class: Normal  
 Pharmacy: [address] Ph #: [phone] Route: Inhalation  
 rosuvastatin (CRESTOR) 20 mg tablet 5 Sig: Take 1 Tab by mouth nightly. Indications: atherosclerotic cardiovascular disease Class: Normal  
 Pharmacy: [address] Ph #: [phone] Route: Oral  
 meloxicam (MOBIC) 15 mg tablet 0 Sig: Take 1 Tab by mouth daily (after breakfast). Take x 3 days and then stop. May use daily after this as needed. Class: Normal  
 Pharmacy: [address] Ph #: [phone] Route: Oral  
  
We Performed the Following  DIABETES EYE EXAM [HM6 Custom] Follow-up Instructions Return in about 4 weeks (around 9/5/2018). Introducing [facility]! Jamil Costa introduces [employer] patient portal. Now you can access parts of your medical record, email your doctor's office, and request medication refills online. 1. In your internet browser, go to [email or web address] 2. Click on the First Time User? Click Here link in the Sign In box. You will see the New Member Sign Up page. 3. Enter your [employer] Access Code exactly as it appears below. You will not need to use this code after youve completed the sign-up process. If you do not sign up before the expiration date, you must request a new code. · [employer] Access Code: [email or web address] Expires: 11/6/2018  4:48 PM 
 
4. Enter the last four digits of your Social Security Number (xxxx) and Date of Birth (mm/dd/yyyy) as indicated and click Submit. You will be taken to the next sign-up page. 5. Create a [employer] ID. This will be your [employer] login ID and cannot be changed, so think of one that is secure and easy to remember. 6. Create a [employer] password. You can change your password at any time. 7. Enter your Password Reset Question and Answer. This can be used at a later time if you forget your password. 8. Enter your e-mail address. You will receive e-mail notification when new information is available in [address] 9. Click Sign Up. You can now view and download portions of your medical record. 10. Click the Download Summary menu link to download a portable copy of your medical information. If you have questions, please visit the Frequently Asked Questions section of the [employer] website. Remember, [employer] is NOT to be used for urgent needs. For medical emergencies, dial 911. Now available from your iPhone and Android! Please provide this summary of care documentation to your next provider. Your primary care clinician is listed as Bianca Castro. Davon England. If you have any questions after today's visit, please call [phone].

## 2018-08-08 NOTE — PROGRESS NOTES
Assessment/Plan:       ICD-10-CM ICD-9-CM    1. Type 2 diabetes mellitus without complication, without long-term current use of insulin (HCC) E11.9 250.00  DIABETES EYE EXAM   2. Essential hypertension I10 401.9 valsartan (DIOVAN) 320 mg tablet      amLODIPine (NORVASC) 10 mg tablet   3. Primary osteoarthritis of both hips M16.0 715.15 meloxicam (MOBIC) 15 mg tablet   4. Mixed hyperlipidemia E78.2 272.2 rosuvastatin (CRESTOR) 20 mg tablet   5. Coronary artery disease due to lipid rich plaque I25.10 414.00 rosuvastatin (CRESTOR) 20 mg tablet    I25.83 414.3     atherosclerosis of abd aorta noted on CT abd/pelv in 2011   6. DDD (degenerative disc disease), lumbar M51.36 722.52 meloxicam (MOBIC) 15 mg tablet   7. Mild intermittent asthma without complication U70.95 102.05      Follow-up Disposition:  Return in about 4 weeks (around 9/6/2018) for Financial screening before 9/20/18; follow up LK \"double slot\" [address] [facility] [address] [place] [address] [address]   [facility] [address]  Phone: [phone] Fax: 664 [facility]. 291, [address] [address]  Phone: [phone] Fax: [phone]    [address]  [address]  [address] [place] [address]  Phone: [phone] Fax: [phone]      [facility]  Subjective:     Chief Complaint   Patient presents with    Asthma     pt c/o coughing and SOB    Generalized Body Aches     pt c/o pain on back, hips, knees and legs    Alexander Holley is a 65 y.o. BLACK OR  female. HPI: knee injection from 7 months ago - provided pain relief inside the knee on the right - sometimes in the lower back and can't walk, then in the thigh and leg and both big toes. Itching on bottom of both feet.   She  has a past medical history of Asthma; CAD (coronary artery disease); Diabetes ([facility] [address] History of pancreatitis (1/26/2018); Hypertension; Mixed hyperlipidemia (10/28/2011); and Thyroid disease. She also has no past medical history of Unspecified sleep apnea. Review of Systems: Negative for: fever, chest pain, shortness of breath, leg swelling, exertional dyspnea, palpitations. Current Medications:   Current Outpatient Prescriptions on File Prior to Visit   Medication Sig    metFORMIN (GLUCOPHAGE) 1,000 mg tablet TAKE ONE TABLET BY MOUTH TWICE A DAY WITH MEALS    levothyroxine (SYNTHROID) 75 mcg tablet TAKE ONE TABLET BY MOUTH DAILY    fluticasone-salmeterol (ADVAIR) 250-50 mcg/dose diskus inhaler Take 1 Puff by inhalation every twelve (12) hours.  glipiZIDE (GLUCOTROL) 5 mg tablet 1 po qday BEFORE breakfast IF blood sugar measures 140 or higher    loratadine (CLARITIN) 10 mg tablet Take 1 Tab by mouth daily. For phlegm in throat    MULTIVITAMIN W-MINERALS/LUTEIN (CENTRUM SILVER PO) Take  by mouth. Takes one po daily     OTHER Salamol CFC free inhaler as needed. Medication from [facility]. No current facility-administered medications on file prior to visit. out of valsartan 320, hctz 12.5 and amlodipine 10mg. .  Took metformin today. Social History: She  reports that she has never smoked. She has never used smokeless tobacco. She reports that she drinks alcohol. She reports that she does not use illicit drugs. Objective:   She changed her glasses last May. Now she is having difficulty seeing again. -appointment for eyes? Patient has NOT received a phone call from her pharmacy or a letter from us concerning any problem with her valsartan, for which she is presently out of at this time. Vitals:    08/08/18 1548   BP: 133/61   Pulse: 88   Temp: 98.7 °F (37.1 °C)   TempSrc: Oral   SpO2: 97%   Weight: 155 lb (70.3 kg)   Height: 4' 11.49\" (1.511 m)    No LMP recorded.  Patient is postmenopausal.   Wt Readings from Last 2 Encounters:   08/08/18 155 lb (70.3 kg)   02/23/18 153 lb (69.4 kg)     No results found for any visits on 08/08/18. Physical Examination:  General appearance - well developed, no acute distress. Chest - clear to auscultation. Heart - regular rate and rhythm without murmurs, rubs, or gallops. Abdomen - bowel sounds present x 4, NT, ND. Extremities - pulses intact. No peripheral edema. Assessment/Plan:   Diagnoses and all orders for this visit:    1. Type 2 diabetes mellitus without complication, without long-term current use of insulin ([facility])  -      DIABETES EYE EXAM    2. Essential hypertension  -     valsartan (DIOVAN) 320 mg tablet; TAKE ONE TABLET BY MOUTH DAILY  -     amLODIPine (NORVASC) 10 mg tablet; Take 1 Tab by mouth daily. 3. Primary osteoarthritis of both hips  -     meloxicam (MOBIC) 15 mg tablet; Take 1 Tab by mouth daily (after breakfast). Take x 3 days and then stop. May use daily after this as needed. 4. Mixed hyperlipidemia  -     rosuvastatin (CRESTOR) 20 mg tablet; Take 1 Tab by mouth nightly. Indications: atherosclerotic cardiovascular disease    5. Coronary artery disease due to lipid rich plaque  Comments:  atherosclerosis of abd aorta noted on CT abd/pelv in 2011  Orders:  -     rosuvastatin (CRESTOR) 20 mg tablet; Take 1 Tab by mouth nightly. Indications: atherosclerotic cardiovascular disease    6. DDD (degenerative disc disease), lumbar  -     meloxicam (MOBIC) 15 mg tablet; Take 1 Tab by mouth daily (after breakfast). Take x 3 days and then stop. May use daily after this as needed. 7. Mild intermittent asthma without complication    Other orders  -     hydroCHLOROthiazide (HYDRODIURIL) 12.5 mg tablet; Take 1 Tab by mouth daily. -     albuterol (PROVENTIL HFA, VENTOLIN HFA, PROAIR HFA) 90 mcg/actuation inhaler; Take 1 Puff by inhalation every four (4) hours as needed for Wheezing. [facility] patient. Prefers [facility] location. She will need labs next time. She does not need to be fasting.   Follow-up Disposition:  Return in about 4 weeks (around 2018) for Financial screening before 18; follow up [facility] \"double slot\" [facility]. Jayne Price, DNP, FNP-BC, BC-ADM  Pain right hip, lateral leg  Comfortable sitting down, worse when standing. Needs non- ventolin inhaler. She is urinating more frequently - every hour. Urinating more for a year; before she was sleeping through the night; now she sleeps for 5 hours and then has to use the bathroom. Takes an afternoon nap to make up for this. Now she sleeps on 1 pillow. Since age 71. Jaswinder Mention expressed understanding of this plan.

## 2018-08-13 ENCOUNTER — TELEPHONE (OUTPATIENT)
Dept: FAMILY MEDICINE CLINIC | Age: 78
End: 2018-08-13

## 2018-08-13 NOTE — TELEPHONE ENCOUNTER
Patient called and left voicemail that she went to the pharmacy to  Diovan 320mg tablets and the pharmacy told her that the medication was not available. Patient states that she has run out of Diovan since this weekend but that she is taking the HCTZ and amlodipine. Halima Saleh have called [employer] on patient's chart, spoke with Valentine Almanza the pharmacist she tells me that Valsartan has been recalled and the companies that have not been recalled are on back order. Pharmacist recommends to switch to a different medication since they do not know when the medication will be available.      Thanks  Greg Gaytan, RN

## 2018-08-15 DIAGNOSIS — I10 ESSENTIAL HYPERTENSION: Primary | ICD-10-CM

## 2018-08-15 RX ORDER — IRBESARTAN 300 MG/1
300 TABLET ORAL
Qty: 30 TAB | Refills: 6 | Status: SHIPPED | OUTPATIENT
Start: 2018-08-15 | End: 2018-08-28 | Stop reason: SINTOL

## 2018-08-15 NOTE — TELEPHONE ENCOUNTER
Telephone call received from the patient asking again about her Diovan prescription. She understands that due to a recall and a shortage (per [employer]) that a substitute medication may need to be prescribed and that her message will be sent again to her provider. She stated she has been out of the [address] [facility] since Sunday, 08-12-18.  Susie Weber RN

## 2018-08-15 NOTE — TELEPHONE ENCOUNTER
Telephone call to patient to let her know I sent in a substitute medication for the diovan since it is not currently available. She will pick it up.

## 2018-08-27 ENCOUNTER — TELEPHONE (OUTPATIENT)
Dept: FAMILY MEDICINE CLINIC | Age: 78
End: 2018-08-27

## 2018-08-27 NOTE — TELEPHONE ENCOUNTER
Telephone call received from the patient who asked to speak with [employer]. She states that her blood pressure medicine is making her increasingly more tired and she would like a different prescription. Per chart review, the patient was seen at [facility] on 08-08-18. Routing to [employer] for review.  Gisela Narvaez RN

## 2018-08-28 DIAGNOSIS — I10 ESSENTIAL HYPERTENSION: Primary | ICD-10-CM

## 2018-08-28 RX ORDER — LOSARTAN POTASSIUM 100 MG/1
100 TABLET ORAL DAILY
Qty: 90 TAB | Refills: 0 | Status: SHIPPED | OUTPATIENT
Start: 2018-08-28 | End: 2018-09-24 | Stop reason: SDUPTHER

## 2018-08-29 NOTE — TELEPHONE ENCOUNTER
I will send in Losartan. If she is still feeling tired, I would like her to return very soon to be seen.

## 2018-08-29 NOTE — TELEPHONE ENCOUNTER
S/w with pt regarding rx being sent in. Discussed medication and the pharmacy it was sent to. Pt also advised of MATILDE Merino recommendation of returning to clinic if she is still feeling tired. Pt verbalized understanding and has no further questions.

## 2018-09-05 NOTE — TELEPHONE ENCOUNTER
Rtc call to pt, she states that the HCTZ has been recalled by the manufacture. She will need another rx sent to replace the HCTZ.

## 2018-09-06 DIAGNOSIS — I10 ESSENTIAL HYPERTENSION: Primary | ICD-10-CM

## 2018-09-06 RX ORDER — HYDROCHLOROTHIAZIDE 25 MG/1
12.5 TABLET ORAL DAILY
Qty: 30 TAB | Refills: 1 | Status: SHIPPED | OUTPATIENT
Start: 2018-09-06 | End: 2018-11-30 | Stop reason: SDUPTHER

## 2018-09-07 NOTE — TELEPHONE ENCOUNTER
Pt notified a new script was sent yesterday and reviewed 1/2 tab daily of HCTZ 25 mg. Pt said pharmacy had called her yesterday also.

## 2018-09-24 ENCOUNTER — HOSPITAL ENCOUNTER (OUTPATIENT)
Dept: LAB | Age: 78
Discharge: HOME | End: 2018-09-24

## 2018-09-24 ENCOUNTER — OFFICE VISIT (OUTPATIENT)
Dept: FAMILY MEDICINE CLINIC | Age: 78
End: 2018-09-24

## 2018-09-24 VITALS
DIASTOLIC BLOOD PRESSURE: 73 MMHG | SYSTOLIC BLOOD PRESSURE: 156 MMHG | TEMPERATURE: 98.8 F | BODY MASS INDEX: 31.45 KG/M2 | HEART RATE: 93 BPM | HEIGHT: 59 IN | WEIGHT: 156 LBS

## 2018-09-24 DIAGNOSIS — E78.2 MIXED HYPERLIPIDEMIA: ICD-10-CM

## 2018-09-24 DIAGNOSIS — E03.9 ACQUIRED HYPOTHYROIDISM: ICD-10-CM

## 2018-09-24 DIAGNOSIS — E11.9 TYPE 2 DIABETES MELLITUS WITHOUT COMPLICATION, WITHOUT LONG-TERM CURRENT USE OF INSULIN (HCC): ICD-10-CM

## 2018-09-24 DIAGNOSIS — I10 ESSENTIAL HYPERTENSION: Primary | ICD-10-CM

## 2018-09-24 LAB
CREAT UR-MCNC: 63 MG/DL
GLUCOSE POC: 136 MG/DL
MICROALBUMIN UR-MCNC: 1.25 MG/DL
MICROALBUMIN/CREAT UR-RTO: 20 MG/G (ref 0–30)

## 2018-09-24 PROCEDURE — 82043 UR ALBUMIN QUANTITATIVE: CPT | Performed by: NURSE PRACTITIONER

## 2018-09-24 RX ORDER — METFORMIN HYDROCHLORIDE 1000 MG/1
TABLET ORAL
Qty: 180 TAB | Refills: 0 | Status: SHIPPED | OUTPATIENT
Start: 2018-09-24 | End: 2018-11-30

## 2018-09-24 RX ORDER — LOSARTAN POTASSIUM 100 MG/1
100 TABLET ORAL DAILY
Qty: 90 TAB | Refills: 0 | Status: SHIPPED | OUTPATIENT
Start: 2018-09-24 | End: 2018-11-30 | Stop reason: SDUPTHER

## 2018-09-24 NOTE — PROGRESS NOTES
Assessment/Plan:   Return for fasting labs this week:  LIPID PANEL  TSH 3RD GENERATION  METABOLIC PANEL, COMPREHENSIVE  CBC WITH AUTOMATED DIFF    ICD-10-CM ICD-9-CM    1. Essential hypertension I10 401.9 losartan (COZAAR) 100 mg tablet   2. Acquired hypothyroidism E03.9 244.9    3. Mixed hyperlipidemia E78.2 272.2    4. Type 2 diabetes mellitus without complication, without long-term current use of insulin (HCC) E11.9 250.00 AMB POC GLUCOSE, QUANTITATIVE, BLOOD      HEMOGLOBIN A1C WITH EAG      MICROALBUMIN, UR, RAND W/ MICROALB/CREAT RATIO      metFORMIN (GLUCOPHAGE) 1,000 mg tablet      Hemoglobin A1c   Date Value Ref Range Status   12/29/2017 6.4 (H) 4.2 - 6.3 % Final   09/13/2017 6.3 4.2 - 6.3 % Final   06/05/2017 6.0 4.2 - 6.3 % Final   10/29/2011 5.6 4.2 - 6.3 % Final     Comment:     PLEASE NOTE NEW REFERENCE RANGE       Follow-up Disposition:  Return in about 2 months (around 11/24/2018) for hypertension, primary care needs. [id number] [place], [place] - [address] [facility]  [address] [facility] [address]  Phone: [phone] Fax: 228 [facility] [address] [place] [address], [address] [address] [place] [address]  Phone: [phone] Fax: [phone]    [facility]8 [address]  [address]  [address] [address]  Phone: [phone] Fax: [phone]      [facility]  Subjective:     Chief Complaint   Patient presents with    Diabetes    Rama Ang is a 77 y.o. BLACK OR  female who speaks [place].  used? no   HPI: doesn't have refills of metformin. Also needs losartan. Coughing for 3 weeks. Daughter bought some cough syrup. Took albuterol last week. Sleeps well. Cough is the same, not getting worse. No difficulty breathing, no pain in the chest.  Her urine is more yellow. Skin turgor is fair.   ROS:   No increased frequency of urination, no increased thirst; no chest pain, dyspnea or TIA's; no numbness, tingling or pain in extremities; no reports of hypoglycemia. Diabetes   Brought in medications? no   Last took medications: this morning; breakfast at 9; before breakfast took amlodipine, losartan, 1/2 tab hctz 25mg, centrum silver, metformin she took after eating. She is still taking the thyroid medication, takes fasting. Stopped taking cholesterol medication 1.5 weeks ago, read they can cause more problems. Taking medications as prescribed? no    Last ate: today     Medications:    Current Outpatient Prescriptions:     metFORMIN (GLUCOPHAGE) 1,000 mg tablet, TAKE ONE TABLET BY MOUTH TWICE A DAY WITH MEALS, Disp: 180 Tab, Rfl: 0    losartan (COZAAR) 100 mg tablet, Take 1 Tab by mouth daily. , Disp: 90 Tab, Rfl: 0    hydroCHLOROthiazide (HYDRODIURIL) 25 mg tablet, Take 0.5 Tabs by mouth daily. , Disp: 30 Tab, Rfl: 1    amLODIPine (NORVASC) 10 mg tablet, Take 1 Tab by mouth daily. , Disp: 90 Tab, Rfl: 1    rosuvastatin (CRESTOR) 20 mg tablet, Take 1 Tab by mouth nightly. Indications: atherosclerotic cardiovascular disease, Disp: 30 Tab, Rfl: 5    meloxicam (MOBIC) 15 mg tablet, Take 1 Tab by mouth daily (after breakfast). Take x 3 days and then stop. May use daily after this as needed. , Disp: 30 Tab, Rfl: 0    albuterol (PROVENTIL HFA, VENTOLIN HFA, PROAIR HFA) 90 mcg/actuation inhaler, Take 1 Puff by inhalation every four (4) hours as needed for Wheezing. SJO patient. Prefers [facility] 3. location. , Disp: 1 Inhaler, Rfl: 1    levothyroxine (SYNTHROID) 75 mcg tablet, TAKE ONE TABLET BY MOUTH DAILY, Disp: 30 Tab, Rfl: 4    fluticasone-salmeterol (ADVAIR) 250-50 mcg/dose diskus inhaler, Take 1 Puff by inhalation every twelve (12) hours. , Disp: 1 Inhaler, Rfl: 1    glipiZIDE (GLUCOTROL) 5 mg tablet, 1 po qday BEFORE breakfast IF blood sugar measures 140 or higher, Disp: 30 Tab, Rfl: 1    loratadine (CLARITIN) 10 mg tablet, Take 1 Tab by mouth daily. For phlegm in throat, Disp: 30 Tab, Rfl: 3    MULTIVITAMIN W-MINERALS/LUTEIN (CENTRUM SILVER PO), Take  by mouth. Takes one po daily , Disp: , Rfl:     OTHER, Salamol CFC free inhaler as needed. Medication from [facility]., Disp: , Rfl:   Social History: She reports that she has never smoked. She has never used smokeless tobacco. She reports that she drinks alcohol. She reports that she does not use illicit drugs. Family History: Her family history includes Hypertension in her father. Objective:     Vitals:    09/24/18 1035   BP: 156/73   Pulse: 93   Temp: 98.8 °F (37.1 °C)   TempSrc: Oral   Weight: 156 lb (70.8 kg)   Height: 4' 11.49\" (1.511 m)    No LMP recorded. Patient is postmenopausal.    Results for orders placed or performed in visit on 09/24/18   AMB POC GLUCOSE, QUANTITATIVE, BLOOD   Result Value Ref Range    Glucose  mg/dL    nonfasting  Wt Readings from Last 2 Encounters:   09/24/18 156 lb (70.8 kg)   08/08/18 155 lb (70.3 kg)    Weight not significantly changed; Hemoglobin A1c   Date Value Ref Range Status   12/29/2017 6.4 (H) 4.2 - 6.3 % Final   09/13/2017 6.3 4.2 - 6.3 % Final    A1C increased;   Lab Results   Component Value Date/Time    Microalbumin/Creat ratio (mg/g creat) 33 (H) 09/13/2017 03:47 PM    Microalbumin,urine random 2.27 09/13/2017 03:47 PM    Creatinine 1.03 (H) 06/05/2017 11:42 AM      Lab Results   Component Value Date/Time    GFR est AA >60 06/05/2017 11:42 AM    GFR est non-AA 52 (L) 06/05/2017 11:42 AM       Lab Results   Component Value Date/Time    Cholesterol, total 232 (H) 01/26/2018 11:50 AM    HDL Cholesterol 66 01/26/2018 11:50 AM    LDL, calculated 140.6 (H) 01/26/2018 11:50 AM    Triglyceride 127 01/26/2018 11:50 AM    CHOL/HDL Ratio 3.5 01/26/2018 11:50 AM      Lab Results   Component Value Date/Time    ALT (SGPT) 23 06/05/2017 11:42 AM    AST (SGOT) 21 06/05/2017 11:42 AM    Alk.  phosphatase 87 06/05/2017 11:42 AM    Bilirubin, total 0.6 06/05/2017 11:42 AM     Constitutional: She appears well-developed. Eyes: EOM are normal. Pupils are equal, round, and reactive to light. Neck: Neck supple. No thyromegaly present. Cardiovascular: Normal rate, regular rhythm, normal heart sounds and intact distal pulses. No murmur heard. Pulmonary/Chest: Effort normal and breath sounds normal.   Musculoskeletal: She exhibits no edema. No ulcers of the lower extremities. Assessment/Plan:   Diagnoses and all orders for this visit:    1. Essential hypertension  -     losartan (COZAAR) 100 mg tablet; Take 1 Tab by mouth daily. 2. Acquired hypothyroidism  -     TSH 3RD GENERATION; Future    3. Mixed hyperlipidemia  -     LIPID PANEL; Future    4. Type 2 diabetes mellitus without complication, without long-term current use of insulin (HCC)  -     AMB POC GLUCOSE, QUANTITATIVE, BLOOD  -     HEMOGLOBIN A1C WITH EAG; Future  -     MICROALBUMIN, UR, RAND W/ MICROALB/CREAT RATIO; Future  -     METABOLIC PANEL, COMPREHENSIVE; Future  -     CBC WITH AUTOMATED DIFF; Future  -     metFORMIN (GLUCOPHAGE) 1,000 mg tablet; TAKE ONE TABLET BY MOUTH TWICE A DAY WITH MEALS    Education provided: high LDL when not on statin, good response while taking statin. Statin shown to increase risk of developing diabetes but not cause and effect relationship. When one has diagnosis of diabetes, benefits of taking statin with no adverse side effects outweigh risks. SHE AGREES TO RESUME HER CHOLESTEROL MEDICATION. Diabetes Mellitus type 2, under uncertain control. Blood pressure under uncertain control. Greater than 50% of this 25 minute visit was spent in face-to-face counseling/coordination of care regarding diabetes management. Follow-up Disposition:  Return in about 2 months (around 11/24/2018) for hypertension, primary care needs. She will resume cholesterol medication.   Shima Tsang, EUNICE, FNP-BC, BC-ADM  Kyaw Han expressed understanding of this plan.

## 2018-09-24 NOTE — PROGRESS NOTES
Chief Complaint   Patient presents with    Diabetes     Coordination of Care  1. Have you been to the ER, urgent care clinic since your last visit? Hospitalized since your last visit? No    2. Have you seen or consulted any other health care providers outside of the [facility] since your last visit? Include any pap smears or colon screening. No    Does the patient need refills? YES    Learning Assessment Complete?  yes

## 2018-09-24 NOTE — MR AVS SNAPSHOT
Gabrielle Batista 
 
 
 [address] [facility] [address] [address] 
[phone] Patient: Regine Henriquez MRN: [id number] HTO:[birth year] Visit Information Date & Time Provider Department Dept. Phone Encounter #  
 9/24/2018 10:30 AM Josesito Thurman [facility] [phone] [id number] Follow-up Instructions Return in about 2 months (around 11/24/2018) for hypertension, primary care needs. Upcoming Health Maintenance Date Due  
 FOOT EXAM Q1 9/4/1950 DTaP/Tdap/Td series (1 - Tdap) 9/4/1960 ZOSTER VACCINE AGE 60> 7/4/1999 Bone Densitometry (Dexa) Screening 9/4/2004 HEMOGLOBIN A1C Q6M 6/29/2018 Influenza Age 5 to Adult 8/1/2018 Pneumococcal 65+ Low/Medium Risk (2 of 2 - PCV13) 9/13/2018 MICROALBUMIN Q1 9/13/2018 LIPID PANEL Q1 1/26/2019 EYE EXAM RETINAL OR DILATED Q1 8/8/2019 GLAUCOMA SCREENING Q2Y 8/8/2020 Allergies as of 9/24/2018  Review Complete On: 9/24/2018 By: Galdino Matt LPN Severity Noted Reaction Type Reactions Aspirin  10/28/2011    Other (comments) Pt states she is asthmatic and \"knows not to take ASA\" Aspirin  11/04/2011    Unknown (comments) Unsure of reaction Codeine  12/29/2017    Itching Also nausea, diarrhea Shellfish Containing Products  11/04/2011    Other (comments) Asthma attack Shellfish Derived  04/25/2016    Hives Current Immunizations  Never Reviewed Name Date Influenza Vaccine (Quad) PF 11/10/2017 Pneumococcal Polysaccharide (PPSV-23) 9/13/2017 Not reviewed this visit You Were Diagnosed With   
  
 Codes Comments Essential hypertension    -  Primary ICD-10-CM: I10 
ICD-9-CM: 401.9 Acquired hypothyroidism     ICD-10-CM: E03.9 ICD-9-CM: 244.9 Mixed hyperlipidemia     ICD-10-CM: E78.2 ICD-9-CM: 272.2  Type 2 diabetes mellitus without complication, without long-term current use of insulin ([facility]     ICD-10-CM: E11.9 ICD-9-CM: 250.00 Vitals BP Pulse Temp Height(growth percentile) Weight(growth percentile) BMI  
 156/73 93 98.8 °F (37.1 °C) (Oral) 4' 11.49\" (1.511 m) 156 lb (70.8 kg) 30.99 kg/m2 OB Status Smoking Status Postmenopausal Never Smoker BMI and BSA Data Body Mass Index Body Surface Area 30.99 kg/m 2 1.72 m 2 Preferred Pharmacy Pharmacy Name Phone [facility] [address] , [address] [facility] [phone] Your Updated Medication List  
  
   
This list is accurate as of 9/24/18 11:21 AM.  Always use your most recent med list.  
  
  
  
  
 albuterol 90 mcg/actuation inhaler Commonly known as:  PROVENTIL HFA, VENTOLIN HFA, PROAIR HFA Take 1 Puff by inhalation every four (4) hours as needed for Wheezing. SJO patient. Prefers [facility] location. amLODIPine 10 mg tablet Commonly known as:  Angie [place]ables Take 1 Tab by mouth daily. CENTRUM SILVER PO Take  by mouth. Takes one po daily  
  
 fluticasone-salmeterol 250-50 mcg/dose diskus inhaler Commonly known as:  ADVAIR Take 1 Puff by inhalation every twelve (12) hours. glipiZIDE 5 mg tablet Commonly known as:  Yanni Edgardo 1 po qday BEFORE breakfast IF blood sugar measures 140 or higher  
  
 hydroCHLOROthiazide 25 mg tablet Commonly known as:  HYDRODIURIL Take 0.5 Tabs by mouth daily. levothyroxine 75 mcg tablet Commonly known as:  SYNTHROID  
TAKE ONE TABLET BY MOUTH DAILY  
  
 loratadine 10 mg tablet Commonly known as:  [place] Take 1 Tab by mouth daily. For phlegm in throat  
  
 losartan 100 mg tablet Commonly known as:  COZAAR Take 1 Tab by mouth daily. meloxicam 15 mg tablet Commonly known as:  MOBIC Take 1 Tab by mouth daily (after breakfast). Take x 3 days and then stop. May use daily after this as needed. metFORMIN 1,000 mg tablet Commonly known as:  GLUCOPHAGE  
 TAKE ONE TABLET BY MOUTH TWICE A DAY WITH MEALS  
  
 OTHER Salamol CFC free inhaler as needed. Medication from [facility]. rosuvastatin 20 mg tablet Commonly known as:  CRESTOR Take 1 Tab by mouth nightly. Indications: atherosclerotic cardiovascular disease Prescriptions Sent to Pharmacy Refills  
 metFORMIN (GLUCOPHAGE) 1,000 mg tablet 0 Sig: TAKE ONE TABLET BY MOUTH TWICE A DAY WITH MEALS Class: Normal  
 Pharmacy: [facility] [address] [place] [address], [address] [place] [address] Ph #: [phone]  
 losartan (COZAAR) 100 mg tablet 0 Sig: Take 1 Tab by mouth daily. Class: Normal  
 Pharmacy: [facility] [address] [place] [address], [address] [place] [address] Ph #: [phone] Route: Oral  
  
We Performed the Following AMB POC GLUCOSE, QUANTITATIVE, BLOOD [50610 CPT(R)] Follow-up Instructions Return in about 2 months (around 11/24/2018) for hypertension, primary care needs. Introducing [facility]! Joana Dixon introduces [facility] patient portal. Now you can access parts of your medical record, email your doctor's office, and request medication refills online. 1. In your internet browser, go to [email or web address] 2. Click on the First Time User? Click Here link in the Sign In box. You will see the New Member Sign Up page. 3. Enter your [facility] Access Code exactly as it appears below. You will not need to use this code after youve completed the sign-up process. If you do not sign up before the expiration date, you must request a new code. · [facility] Access Code: [email or web address] Expires: 11/6/2018  4:48 PM 
 
4. Enter the last four digits of your Social Security Number (xxxx) and Date of Birth (mm/dd/yyyy) as indicated and click Submit. You will be taken to the next sign-up page. 5. Create a [facility] ID.  This will be your [facility] login ID and cannot be changed, so think of one that is secure and easy to remember. 6. Create a [facility] password. You can change your password at any time. 7. Enter your Password Reset Question and Answer. This can be used at a later time if you forget your password. 8. Enter your e-mail address. You will receive e-mail notification when new information is available in [address] 9. Click Sign Up. You can now view and download portions of your medical record. 10. Click the Download Summary menu link to download a portable copy of your medical information. If you have questions, please visit the Frequently Asked Questions section of the [employer] website. Remember, [facility] is NOT to be used for urgent needs. For medical emergencies, dial 911. Now available from your iPhone and Android! Please provide this summary of care documentation to your next provider. Your primary care clinician is listed as Ermias Alexander. If you have any questions after today's visit, please call [phone].

## 2018-09-26 ENCOUNTER — HOSPITAL ENCOUNTER (OUTPATIENT)
Dept: LAB | Age: 78
Discharge: HOME | End: 2018-09-26

## 2018-09-26 LAB
ALBUMIN SERPL-MCNC: 4.2 G/DL (ref 3.5–5)
ALBUMIN/GLOB SERPL: 1.2 {RATIO} (ref 1.1–2.2)
ALP SERPL-CCNC: 73 U/L (ref 45–117)
ALT SERPL-CCNC: 18 U/L (ref 12–78)
ANION GAP SERPL CALC-SCNC: 9 MMOL/L (ref 5–15)
AST SERPL-CCNC: 18 U/L (ref 15–37)
BASOPHILS # BLD: 0 K/UL (ref 0–0.1)
BASOPHILS NFR BLD: 1 % (ref 0–1)
BILIRUB SERPL-MCNC: 1.1 MG/DL (ref 0.2–1)
BUN SERPL-MCNC: 16 MG/DL (ref 6–20)
BUN/CREAT SERPL: 15 (ref 12–20)
CALCIUM SERPL-MCNC: 9.3 MG/DL (ref 8.5–10.1)
CHLORIDE SERPL-SCNC: 100 MMOL/L (ref 97–108)
CHOLEST SERPL-MCNC: 330 MG/DL
CO2 SERPL-SCNC: 30 MMOL/L (ref 21–32)
CREAT SERPL-MCNC: 1.05 MG/DL (ref 0.55–1.02)
DIFFERENTIAL METHOD BLD: ABNORMAL
EOSINOPHIL # BLD: 0.1 K/UL (ref 0–0.4)
EOSINOPHIL NFR BLD: 2 % (ref 0–7)
ERYTHROCYTE [DISTWIDTH] IN BLOOD BY AUTOMATED COUNT: 14.6 % (ref 11.5–14.5)
GLOBULIN SER CALC-MCNC: 3.4 G/DL (ref 2–4)
GLUCOSE SERPL-MCNC: 107 MG/DL (ref 65–100)
HCT VFR BLD AUTO: 42.2 % (ref 35–47)
HDLC SERPL-MCNC: 65 MG/DL
HDLC SERPL: 5.1 {RATIO} (ref 0–5)
HGB BLD-MCNC: 13.9 G/DL (ref 11.5–16)
IMM GRANULOCYTES # BLD: 0 K/UL (ref 0–0.04)
IMM GRANULOCYTES NFR BLD AUTO: 0 % (ref 0–0.5)
LDLC SERPL CALC-MCNC: 231.6 MG/DL (ref 0–100)
LIPID PROFILE,FLP: ABNORMAL
LYMPHOCYTES # BLD: 2.6 K/UL (ref 0.8–3.5)
LYMPHOCYTES NFR BLD: 39 % (ref 12–49)
MCH RBC QN AUTO: 28.3 PG (ref 26–34)
MCHC RBC AUTO-ENTMCNC: 32.9 G/DL (ref 30–36.5)
MCV RBC AUTO: 85.9 FL (ref 80–99)
MONOCYTES # BLD: 0.5 K/UL (ref 0–1)
MONOCYTES NFR BLD: 8 % (ref 5–13)
NEUTS SEG # BLD: 3.3 K/UL (ref 1.8–8)
NEUTS SEG NFR BLD: 50 % (ref 32–75)
NRBC # BLD: 0 K/UL (ref 0–0.01)
NRBC BLD-RTO: 0 PER 100 WBC
PLATELET # BLD AUTO: 339 K/UL (ref 150–400)
PMV BLD AUTO: 10.1 FL (ref 8.9–12.9)
POTASSIUM SERPL-SCNC: 3.7 MMOL/L (ref 3.5–5.1)
PROT SERPL-MCNC: 7.6 G/DL (ref 6.4–8.2)
RBC # BLD AUTO: 4.91 M/UL (ref 3.8–5.2)
SODIUM SERPL-SCNC: 139 MMOL/L (ref 136–145)
TRIGL SERPL-MCNC: 167 MG/DL (ref ?–150)
TSH SERPL DL<=0.05 MIU/L-ACNC: 0.94 UIU/ML (ref 0.36–3.74)
VLDLC SERPL CALC-MCNC: 33.4 MG/DL
WBC # BLD AUTO: 6.6 K/UL (ref 3.6–11)

## 2018-09-26 PROCEDURE — 85025 COMPLETE CBC W/AUTO DIFF WBC: CPT | Performed by: NURSE PRACTITIONER

## 2018-09-26 PROCEDURE — 80061 LIPID PANEL: CPT | Performed by: NURSE PRACTITIONER

## 2018-09-26 PROCEDURE — 83036 HEMOGLOBIN GLYCOSYLATED A1C: CPT | Performed by: NURSE PRACTITIONER

## 2018-09-26 PROCEDURE — 80053 COMPREHEN METABOLIC PANEL: CPT | Performed by: NURSE PRACTITIONER

## 2018-09-26 PROCEDURE — 84443 ASSAY THYROID STIM HORMONE: CPT | Performed by: NURSE PRACTITIONER

## 2018-09-27 LAB
EST. AVERAGE GLUCOSE BLD GHB EST-MCNC: 134 MG/DL
HBA1C MFR BLD: 6.3 % (ref 4.2–6.3)

## 2018-09-29 NOTE — PROGRESS NOTES
Has follow up soon. Normal/stable labs EXCEPT that his LDL is the highest it's ever been. Total chol also the highest it's ever been. Is he taking the Crestor?

## 2018-11-26 DIAGNOSIS — E03.9 HYPOTHYROIDISM: ICD-10-CM

## 2018-11-26 RX ORDER — LEVOTHYROXINE SODIUM 75 UG/1
TABLET ORAL
Qty: 30 TAB | Refills: 3 | Status: SHIPPED | OUTPATIENT
Start: 2018-11-26 | End: 2018-11-30

## 2018-11-30 ENCOUNTER — OFFICE VISIT (OUTPATIENT)
Dept: FAMILY MEDICINE CLINIC | Age: 78
End: 2018-11-30

## 2018-11-30 ENCOUNTER — HOSPITAL ENCOUNTER (OUTPATIENT)
Dept: LAB | Age: 78
Discharge: HOME | End: 2018-11-30

## 2018-11-30 VITALS
SYSTOLIC BLOOD PRESSURE: 159 MMHG | WEIGHT: 153 LBS | HEART RATE: 104 BPM | BODY MASS INDEX: 30.4 KG/M2 | DIASTOLIC BLOOD PRESSURE: 79 MMHG | TEMPERATURE: 98.9 F

## 2018-11-30 DIAGNOSIS — E11.9 TYPE 2 DIABETES MELLITUS WITHOUT COMPLICATION, WITHOUT LONG-TERM CURRENT USE OF INSULIN (HCC): ICD-10-CM

## 2018-11-30 DIAGNOSIS — E78.2 MIXED HYPERLIPIDEMIA: ICD-10-CM

## 2018-11-30 DIAGNOSIS — I25.83 CORONARY ARTERY DISEASE DUE TO LIPID RICH PLAQUE: ICD-10-CM

## 2018-11-30 DIAGNOSIS — E03.9 HYPOTHYROIDISM: ICD-10-CM

## 2018-11-30 DIAGNOSIS — I10 ESSENTIAL HYPERTENSION: ICD-10-CM

## 2018-11-30 DIAGNOSIS — I25.10 CORONARY ARTERY DISEASE DUE TO LIPID RICH PLAQUE: ICD-10-CM

## 2018-11-30 DIAGNOSIS — R09.89 PHLEGM IN THROAT: ICD-10-CM

## 2018-11-30 LAB
CHOLEST SERPL-MCNC: 325 MG/DL
HDLC SERPL-MCNC: 59 MG/DL
HDLC SERPL: 5.5 {RATIO} (ref 0–5)
LDLC SERPL CALC-MCNC: 237 MG/DL (ref 0–100)
LIPID PROFILE,FLP: ABNORMAL
TRIGL SERPL-MCNC: 145 MG/DL (ref ?–150)
VLDLC SERPL CALC-MCNC: 29 MG/DL

## 2018-11-30 PROCEDURE — 80061 LIPID PANEL: CPT

## 2018-11-30 RX ORDER — HYDROCHLOROTHIAZIDE 25 MG/1
12.5 TABLET ORAL DAILY
Qty: 30 TAB | Refills: 6 | Status: SHIPPED | OUTPATIENT
Start: 2018-11-30 | End: 2020-11-23

## 2018-11-30 RX ORDER — GLIPIZIDE 5 MG/1
TABLET ORAL
Qty: 30 TAB | Refills: 3 | Status: SHIPPED | OUTPATIENT
Start: 2018-11-30 | End: 2020-10-14 | Stop reason: SDUPTHER

## 2018-11-30 RX ORDER — LORATADINE 10 MG/1
10 TABLET ORAL DAILY
Qty: 30 TAB | Refills: 3 | Status: SHIPPED | OUTPATIENT
Start: 2018-11-30 | End: 2020-10-14 | Stop reason: SDUPTHER

## 2018-11-30 RX ORDER — LEVOTHYROXINE SODIUM 75 UG/1
TABLET ORAL
Qty: 90 TAB | Refills: 3 | Status: SHIPPED | OUTPATIENT
Start: 2018-11-30 | End: 2019-12-26

## 2018-11-30 RX ORDER — AMLODIPINE BESYLATE 10 MG/1
10 TABLET ORAL DAILY
Qty: 90 TAB | Refills: 1 | Status: SHIPPED | OUTPATIENT
Start: 2018-11-30 | End: 2020-10-14 | Stop reason: SDUPTHER

## 2018-11-30 RX ORDER — LOSARTAN POTASSIUM 100 MG/1
100 TABLET ORAL DAILY
Qty: 90 TAB | Refills: 1 | Status: SHIPPED | OUTPATIENT
Start: 2018-11-30 | End: 2020-10-14

## 2018-11-30 RX ORDER — ROSUVASTATIN CALCIUM 20 MG/1
20 TABLET, COATED ORAL
Qty: 30 TAB | Refills: 5 | Status: SHIPPED | OUTPATIENT
Start: 2018-11-30 | End: 2020-10-14

## 2018-11-30 RX ORDER — METFORMIN HYDROCHLORIDE 1000 MG/1
TABLET ORAL
Qty: 180 TAB | Refills: 1 | Status: SHIPPED | OUTPATIENT
Start: 2018-11-30 | End: 2020-10-14

## 2018-11-30 NOTE — PROGRESS NOTES
Chief Complaint   Patient presents with    Hypertension    Knee Pain     both knees. States it was getting better but now painis back. Right knee she feels a burning sensation and the left she feels like the knee cap slips out of place. Coordination of Care  1. Have you been to the ER, urgent care clinic since your last visit? Hospitalized since your last visit? No    2. Have you seen or consulted any other health care providers outside of the [facility] since your last visit? Include any pap smears or colon screening. No    Does the patient need refills?  YES    Learning Assessment Complete? yes    Pt request flu vaccine

## 2018-11-30 NOTE — PATIENT INSTRUCTIONS
1. Please try a knee brace for your LEFT knee. See if this helps you to walk easier. 2. You may benefit from another injection of the RIGHT knee. Last one was December, 2017.

## 2018-11-30 NOTE — PROGRESS NOTES
Assessment/Plan:       ICD-10-CM ICD-9-CM    1. Phlegm in throat R09.89 786.4 loratadine (CLARITIN) 10 mg tablet   2. Essential hypertension I10 401.9 amLODIPine (NORVASC) 10 mg tablet      losartan (COZAAR) 100 mg tablet   3. Mixed hyperlipidemia E78.2 272.2 rosuvastatin (CRESTOR) 20 mg tablet      LIPID PANEL   4. Coronary artery disease due to lipid rich plaque I25.10 414.00 rosuvastatin (CRESTOR) 20 mg tablet    I25.83 414.3     atherosclerosis of abd aorta noted on CT abd/pelv in 2011   5. Type 2 diabetes mellitus without complication, without long-term current use of insulin (HCC) E11.9 250.00 glipiZIDE (GLUCOTROL) 5 mg tablet      metFORMIN (GLUCOPHAGE) 1,000 mg tablet   6. Hypothyroidism E03.9 244.9 levothyroxine (SYNTHROID) 75 mcg tablet     Follow-up Disposition:  Return for To be connected with [employer] .     [id number] [place] [place] - [address] [facility] AT [facility]  [address] [facility] [address]  [facility] [address] [place] [address]  Phone: [phone] Fax: 228 [facility] [address] [facility], [address] [address] [place] [address]  Phone: [phone] Fax: [phone]    [address] [address] [address]  [address]  [address] [facility] [address]  Phone: [phone] Fax: [phone]    [address] [place], [address] [facility].  Ese Potter [address] [facility] [address]  Phone: [phone] Fax: [phone]      [facility] OUT[facility]ACH CLINIC  Subjective:     Chief Complaint   Patient presents with    Hypertension    Knee Pain     both knees. States it was getting better but now painis back. Right knee she feels a burning sensation and the left she feels like the knee cap slips out of place. Francisco Javier Esparza is a 77 y.o. BLACK OR  female. The left one slips. The right one good until 2 months ago. Had injection last December. HPI: needs flu vaccine. Taking Crestor?  Yes  Needs med refills. Wants another injection in right knee - Dr. Yolie Juárez did it last time. She  has a past medical history of Asthma, CAD (coronary artery disease), Diabetes ([address] History of pancreatitis, Hypertension, Mixed hyperlipidemia, and Thyroid disease. Review of Systems: Negative for: fever, chest pain, shortness of breath, leg swelling, exertional dyspnea, palpitations. Had coffee with cream this morning. Check nonfasting lipid panel  Current Medications:   Current Outpatient Medications on File Prior to Visit   Medication Sig    meloxicam (MOBIC) 15 mg tablet Take 1 Tab by mouth daily (after breakfast). Take x 3 days and then stop. May use daily after this as needed.  albuterol (PROVENTIL HFA, VENTOLIN HFA, PROAIR HFA) 90 mcg/actuation inhaler Take 1 Puff by inhalation every four (4) hours as needed for Wheezing. [facility] patient. [facility].  fluticasone-salmeterol (ADVAIR) 250-50 mcg/dose diskus inhaler Take 1 Puff by inhalation every twelve (12) hours.  MULTIVITAMIN W-MINERALS/LUTEIN (CENTRUM SILVER PO) Take  by mouth. Takes one po daily     OTHER Salamol CFC free inhaler as needed. Medication from [facility]. No current facility-administered medications on file prior to visit. Social History: She  reports that  has never smoked. she has never used smokeless tobacco. She reports that she drinks alcohol. She reports that she does not use drugs. Objective:     Vitals:    11/30/18 0933   BP: 159/79   Pulse: (!) 104   Temp: 98.9 °F (37.2 °C)   TempSrc: Oral   Weight: 153 lb (69.4 kg)    No LMP recorded. Patient is postmenopausal.   Wt Readings from Last 2 Encounters:   11/30/18 153 lb (69.4 kg)   09/24/18 156 lb (70.8 kg)   Weight loss noted. No results found for any visits on 11/30/18. Physical Examination: Left patella with + crepitus (worse with walking). General appearance - well developed, no acute distress. Chest - clear to auscultation.   Heart - regular rate and rhythm without murmurs, rubs, or gallops. Abdomen - bowel sounds present x 4, NT, ND. Extremities - pulses intact. No peripheral edema. Assessment/Plan:   Diagnoses and all orders for this visit:    1. Phlegm in throat  -     loratadine (CLARITIN) 10 mg tablet; Take 1 Tab by mouth daily. For phlegm in throat    2. Essential hypertension  -     amLODIPine (NORVASC) 10 mg tablet; Take 1 Tab by mouth daily. -     losartan (COZAAR) 100 mg tablet; Take 1 Tab by mouth daily. 3. Mixed hyperlipidemia  -     rosuvastatin (CRESTOR) 20 mg tablet; Take 1 Tab by mouth nightly. -     LIPID PANEL; Future    4. Coronary artery disease due to lipid rich plaque  Comments:  atherosclerosis of abd aorta noted on CT abd/pelv in 2011  Orders:  -     rosuvastatin (CRESTOR) 20 mg tablet; Take 1 Tab by mouth nightly. 5. Type 2 diabetes mellitus without complication, without long-term current use of insulin (HCC)  -     glipiZIDE (GLUCOTROL) 5 mg tablet; 1 po qday BEFORE breakfast IF blood sugar measures 140 or higher  -     metFORMIN (GLUCOPHAGE) 1,000 mg tablet; TAKE ONE TABLET BY MOUTH TWICE A DAY WITH MEALS    6. Hypothyroidism  -     levothyroxine (SYNTHROID) 75 mcg tablet; TAKE ONE TABLET BY MOUTH DAILY    Other orders  -     hydroCHLOROthiazide (HYDRODIURIL) 25 mg tablet; Take 0.5 Tabs by mouth daily. Follow-up Disposition:  Return for To be connected with [employer]. .   We have not x-rayed the left knee. She is taking Tylenol 1 po bid. Physical therapy  - nothing more they can do. Walk around. Now she has a productive cough. Does not feel choked when she is swallowing anything. Brings up thick phlegm. When she had this before, we tried claritin. She thinks this helped. Rand Sacks, EUNICE, FNP-BC, BC-ADM  Vladislav Wright expressed understanding of this plan.

## 2018-12-01 NOTE — PROGRESS NOTES
Your cholesterol is slightly worse. This would be consistent with not taking the Crestor prescription, or if you are taking it, that it is not working. Please discuss with your new doctor.

## 2019-12-19 DIAGNOSIS — E03.9 HYPOTHYROIDISM: ICD-10-CM

## 2019-12-19 RX ORDER — LEVOTHYROXINE SODIUM 75 UG/1
TABLET ORAL
Qty: 90 TAB | Refills: 2 | OUTPATIENT
Start: 2019-12-19

## 2019-12-19 NOTE — TELEPHONE ENCOUNTER
The note in the chart says she was planning to see another doctor. If she plans to see us, we need to plan a follow up appointment and when to return for labs.

## 2019-12-23 DIAGNOSIS — E03.9 HYPOTHYROIDISM: ICD-10-CM

## 2019-12-26 RX ORDER — LEVOTHYROXINE SODIUM 75 UG/1
TABLET ORAL
Qty: 90 TAB | Refills: 2 | Status: SHIPPED | OUTPATIENT
Start: 2019-12-26 | End: 2020-10-14 | Stop reason: SDUPTHER

## 2019-12-30 DIAGNOSIS — E03.9 HYPOTHYROIDISM: ICD-10-CM

## 2019-12-30 NOTE — TELEPHONE ENCOUNTER
The pt's LOV=11/30/18. Received a request for refillls in the [facility] office from the [address] [place] [address] for Levothyroxine. Routed to the provider for review.  Va Mays RN

## 2020-01-03 RX ORDER — LEVOTHYROXINE SODIUM 75 UG/1
TABLET ORAL
Qty: 90 TAB | Refills: 2 | OUTPATIENT
Start: 2020-01-03

## 2020-01-03 NOTE — TELEPHONE ENCOUNTER
On 12/26/19 I sent levothyrox 75 mg #90 to the patient's pharmacy. Will need appointment to return and I will plan fasting labs a week before this appointment.

## 2020-10-14 ENCOUNTER — VIRTUAL VISIT (OUTPATIENT)
Dept: FAMILY MEDICINE CLINIC | Age: 80
End: 2020-10-14

## 2020-10-14 DIAGNOSIS — E11.9 TYPE 2 DIABETES MELLITUS WITHOUT COMPLICATION, WITHOUT LONG-TERM CURRENT USE OF INSULIN (HCC): ICD-10-CM

## 2020-10-14 DIAGNOSIS — R09.89 PHLEGM IN THROAT: ICD-10-CM

## 2020-10-14 DIAGNOSIS — E03.9 HYPOTHYROIDISM: ICD-10-CM

## 2020-10-14 DIAGNOSIS — I25.10 CORONARY ARTERY DISEASE DUE TO LIPID RICH PLAQUE: ICD-10-CM

## 2020-10-14 DIAGNOSIS — I10 ESSENTIAL HYPERTENSION: ICD-10-CM

## 2020-10-14 DIAGNOSIS — E78.2 MIXED HYPERLIPIDEMIA: ICD-10-CM

## 2020-10-14 DIAGNOSIS — I25.83 CORONARY ARTERY DISEASE DUE TO LIPID RICH PLAQUE: ICD-10-CM

## 2020-10-14 PROCEDURE — 99442 PR PHYS/QHP TELEPHONE EVALUATION 11-20 MIN: CPT | Performed by: NURSE PRACTITIONER

## 2020-10-14 RX ORDER — LORATADINE 10 MG/1
10 TABLET ORAL DAILY
Qty: 90 TAB | Refills: 0 | Status: SHIPPED | OUTPATIENT
Start: 2020-10-14

## 2020-10-14 RX ORDER — LEVOTHYROXINE SODIUM 75 UG/1
75 TABLET ORAL
Qty: 90 TAB | Refills: 0 | Status: SHIPPED | OUTPATIENT
Start: 2020-10-14 | End: 2021-01-12

## 2020-10-14 RX ORDER — GLIPIZIDE 5 MG/1
TABLET ORAL
Qty: 90 TAB | Refills: 0 | Status: SHIPPED | OUTPATIENT
Start: 2020-10-14 | End: 2021-01-12

## 2020-10-14 RX ORDER — ATORVASTATIN CALCIUM 80 MG/1
80 TABLET, FILM COATED ORAL DAILY
COMMUNITY
End: 2020-10-14 | Stop reason: SDUPTHER

## 2020-10-14 RX ORDER — VALSARTAN 160 MG/1
TABLET ORAL DAILY
COMMUNITY
End: 2020-10-14 | Stop reason: SDUPTHER

## 2020-10-14 RX ORDER — AMLODIPINE BESYLATE 10 MG/1
10 TABLET ORAL DAILY
Qty: 90 TAB | Refills: 0 | Status: SHIPPED | OUTPATIENT
Start: 2020-10-14 | End: 2021-01-12

## 2020-10-14 RX ORDER — ATORVASTATIN CALCIUM 80 MG/1
80 TABLET, FILM COATED ORAL DAILY
Qty: 90 TAB | Refills: 0 | Status: SHIPPED | OUTPATIENT
Start: 2020-10-14 | End: 2021-08-24 | Stop reason: SDUPTHER

## 2020-10-14 RX ORDER — VALSARTAN 160 MG/1
160 TABLET ORAL DAILY
Qty: 90 TAB | Refills: 0 | Status: SHIPPED | OUTPATIENT
Start: 2020-10-14 | End: 2020-11-23 | Stop reason: DRUGHIGH

## 2020-10-14 NOTE — PROGRESS NOTES
Duration of call 15 minutes. Discharge nurse  Check-out Note: we need her most recent lab results;  may need goodrx coupons today   Return in about 1 week (around 10/21/2020) for return for F2F LK or other provider regarding right knee/leg pain;.     Discharge nurse encounter completed via telephoned call. Name and  verified. AVS, prescription and pharmacy location reviewed . Goodrx coupon codes sent via text per patient's request.When I asked the patient where she got her last blood work patient tells me that  she has [facility] and also sees a [facility]rologist. Nurse explained to patient that she has to chose either [facility] or [facility] for her chronic illnesses per policy and safety, patient states that she has to consult with her daughter and will let us know what she decides, discussed with patient that she can still see us for acute care (same day appt. ) if she is not able to get an appt. At [facility] . Will hold off on 1 week face to face appointment for knee/leg pain and requesting lab results per provider until patient let us know on her decision about following up care- will let Amy Godinez NP  Know. Of new plan. This has been fully explained to the patient, who indicates understanding and agrees with plan. No further questions at this time. Mara Britt RN    1:04 PM  Provider Robin Berman NP has been aware of the above and agrees with plan.  Mara Britt RN

## 2020-10-14 NOTE — PROGRESS NOTES
Coordination of Care  1. Have you been to the ER, urgent care clinic since your last visit? Hospitalized since your last visit? Yes When: 10/3/2020- [facility] Cyst left shoulder    2. Have you seen or consulted any other health care providers outside of the [address] [place] [address] since your last visit? Include any pap smears or colon screening. No    Does the patient need refills? YES    Learning Assessment Complete?  yes  Depression Screening complete in the past 12 months? yes

## 2020-10-14 NOTE — PROGRESS NOTES
Total Time: minutes: 11-20 minutes  Diagnoses and all orders for this visit:    1. Mixed hyperlipidemia  -     atorvastatin (LIPITOR) 80 mg tablet; Take 1 Tab by mouth daily. 1 po daily    2. Coronary artery disease due to lipid rich plaque  Comments:  atherosclerosis of abd aorta noted on CT abd/pelv in 2011    3. Hypothyroidism  -     levothyroxine (SYNTHROID) 75 mcg tablet; Take 1 Tab by mouth Daily (before breakfast). 4. Essential hypertension  -     valsartan (DIOVAN) 160 mg tablet; Take 1 Tab by mouth daily. A po daily  -     amLODIPine (NORVASC) 10 mg tablet; Take 1 Tab by mouth daily. 5. Phlegm in throat  -     loratadine (CLARITIN) 10 mg tablet; Take 1 Tab by mouth daily. For phlegm in throat    6. Type 2 diabetes mellitus without complication, without long-term current use of insulin (HCC)  -     glipiZIDE (GLUCOTROL) 5 mg tablet; 1 po qday BEFORE breakfast IF blood sugar measures 140 or higher      Follow-up and Dispositions    · Return in about 1 week (around 10/21/2020) for return for F2F LK or other provider regarding right knee/leg pain;. I communicated with the patient and/or health care decision maker about details of this discussion including any medical advice provided: we need her most recent lab results; return for F2F regarding right knee/leg pain; may need goodrx coupons today  Dionisio Vallecillo is a 79 y.o. female evaluated via telephone at [phone] on 10/14/2020. Patient identification verified with 2 identifiers. Consent: She and/or health care decision maker has provided verbal consent to proceed: Yes   Pursuant to the emergency declaration under the [facility], [facility] authority and the [employer] Act, this Virtual Telephone Visit was conducted to reduce the patient's risk of exposure to COVID-19.   : Sherry Barnett    Chief Complaint   Patient presents with   [address] PAIN     x several years with leg pain    Knee Pain     x 3 years    Medication Refill   -states has had labs done recently  Current Outpatient Medications   Medication Sig Dispense Refill    atorvastatin (LIPITOR) 80 mg tablet Take 80 mg by mouth daily. 1 po daily      valsartan (DIOVAN) 160 mg tablet Take  by mouth daily. A po daily      levothyroxine (SYNTHROID) 75 mcg tablet TAKE ONE TABLET BY MOUTH DAILY 90 Tab 2    loratadine (CLARITIN) 10 mg tablet Take 1 Tab by mouth daily. For phlegm in throat 30 Tab 3    amLODIPine (NORVASC) 10 mg tablet Take 1 Tab by mouth daily. 90 Tab 1    glipiZIDE (GLUCOTROL) 5 mg tablet 1 po qday BEFORE breakfast IF blood sugar measures 140 or higher 30 Tab 3     History of Present Illness  She slipped and fell. Difficult to walk and get up, the right knee. No recent injury. Now feels pain to the right groin, right side, right knee, with difficulty walking. Feeling this pain for 8 months but recently it has intensified. She went to physical therapy, reports she was told that one leg was shorter than the other. sprained left ankle and left knee 10-12 years ago. Return f2F for evaluation. She has had injections, last one 2017. States the knee is swollen. Has taken Tylenol \"for a long time\" and it doesn't work. She was put on sulfa/trimethoprim '800/100\". .  Had a sebaceous cyst on her left shoulder and it was removed. Tylenol 1000 mg bid recommended for pain as allergic to aspirin, unknown renal function. No physical exam performed due to telephone visit. I affirm this is a Patient Initiated Episode with a Patient who has not had a related appointment within my department in the past 7 days or scheduled within the next 24 hours. MATILDE Tillman Marisol expressed understanding and agreed to this plan.

## 2020-10-15 ENCOUNTER — TELEPHONE (OUTPATIENT)
Dept: FAMILY MEDICINE CLINIC | Age: 80
End: 2020-10-15

## 2020-10-15 NOTE — TELEPHONE ENCOUNTER
Abhishek from the pt. She called to confirm and let Shalini Gaming DNP know that she wanted to continue her care at the [place] [id number]. She is choosing the [facility] as her PCP. She wanted to make sure the provider got the message so her care can be coordinated per the pt's Dtr. The Dtr had  gotten on the phone after the pt and stated the pt does not want to continue care with the [facility]. This message was routed to the provider.  Linda Dickinson RN

## 2020-10-21 NOTE — TELEPHONE ENCOUNTER
This provider has received the message that both patient and daughter agree to have patient see the [facility]. [facility] and she will not return to receive care there.   Eva Garibay, NP

## 2020-11-09 ENCOUNTER — TELEPHONE (OUTPATIENT)
Dept: FAMILY MEDICINE CLINIC | Age: 80
End: 2020-11-09

## 2020-11-09 NOTE — TELEPHONE ENCOUNTER
Chart reviewed patient was seen on 10/14/2020 via virtual visit and per provider Shalini Gaming NP \"Return in about 1 week (around 10/21/2020) for return for F2F LK or other provider regarding right knee/leg pain\" at that time appointment was not schedule due to patient was going to think about where she was going to receive care [facility]. Patient has decided to continue with us for her health care needs. Can you please schedule face to face follow up appt. ?    Thanks   Cristin Crockett RN     Routing to [facility] front office

## 2020-11-11 NOTE — PROGRESS NOTES
11/12/2020  Assessment/Plan:   Diagnoses and all orders for this visit:    1. Diabetes mellitus type 2, diet-controlled (HCC)  -     AMB POC GLUCOSE BLOOD, BY GLUCOSE MONITORING DEVICE    2. Severe obesity ([address]    3. Primary osteoarthritis of both hips    4. DDD (degenerative disc disease), lumbar        Health Maintenance Due   Topic Date Due    Foot Exam Q1  09/04/1950    DTaP/Tdap/Td series (1 - Tdap) 09/04/1960    Shingrix Vaccine Age 50> (1 of 2) 09/04/1989    Bone Densitometry (Dexa) Screening  09/04/2004    A1C test (Diabetic or Prediabetic)  03/26/2019    MICROALBUMIN Q1  09/24/2019    Lipid Screen  11/30/2019    GLAUCOMA SCREENING Q2Y  08/30/2020    Eye Exam Retinal or Dilated  08/30/2020    Flu Vaccine (1) 09/01/2020     Paulina Villeda, MSN, RN, FNP-BC, BC-ADM  Jordana Stock expressed understanding of this plan. [facility]  Subjective: Jordana Stock is a 79 y.o. female. Chief Complaint   Patient presents with    Knee Pain     right side, goind to her hip    Back Pain   CC: Right knee/leg pain  History of Present Illness: there has been no injury. Review of Systems: Negative for: fever, chest pain, shortness of breath, leg swelling. Social History: She  reports that she has never smoked. She has never used smokeless tobacco. She reports current alcohol use. She reports that she does not use drugs. Current Medications: has a current medication list which includes the following prescription(s): atorvastatin, valsartan, levothyroxine, amlodipine, loratadine, glipizide, folic acid/multivit-min/lutein, hydrochlorothiazide, meloxicam, albuterol, fluticasone propion-salmeterol, and OTHER.   Objective:     Visit Vitals  BP (!) 187/95 (BP 1 Location: Left arm, BP Patient Position: Sitting)   Pulse (!) 106   Temp 97.2 °F (36.2 °C)   Ht 5' 0.24\" (1.53 m)   Wt 180 lb 12.8 oz (82 kg)   BMI 35.03 kg/m²      Wt Readings from Last 2 Encounters:   11/12/20 180 lb 12.8 oz (82 kg)   11/30/18 153 lb (69.4 kg)      Results for orders placed or performed in visit on 11/12/20   AMB POC GLUCOSE BLOOD, BY GLUCOSE MONITORING DEVICE   Result Value Ref Range    Glucose POC nf-194 mg/dL      Physical Examination:   General appearance - well developed, no acute distress. Chest - clear to auscultation. Heart - regular rate and rhythm without murmurs, rubs, or gallops. Abdomen - bowel sounds present x 4, NT, ND  Extremities - distal sensation intact, no CCE. Weakness of the right quadriceps musculature compared with the left quadriceps.

## 2020-11-12 ENCOUNTER — OFFICE VISIT (OUTPATIENT)
Dept: FAMILY MEDICINE CLINIC | Age: 80
End: 2020-11-12

## 2020-11-12 ENCOUNTER — HOSPITAL ENCOUNTER (OUTPATIENT)
Dept: LAB | Age: 80
Discharge: HOME | End: 2020-11-12

## 2020-11-12 VITALS
SYSTOLIC BLOOD PRESSURE: 187 MMHG | WEIGHT: 180.8 LBS | TEMPERATURE: 97.2 F | HEIGHT: 60 IN | HEART RATE: 106 BPM | DIASTOLIC BLOOD PRESSURE: 95 MMHG | BODY MASS INDEX: 35.5 KG/M2

## 2020-11-12 DIAGNOSIS — E11.9 DIABETES MELLITUS TYPE 2, DIET-CONTROLLED (HCC): Primary | ICD-10-CM

## 2020-11-12 DIAGNOSIS — M16.0 PRIMARY OSTEOARTHRITIS OF BOTH HIPS: ICD-10-CM

## 2020-11-12 DIAGNOSIS — E11.9 DIABETES MELLITUS TYPE 2, DIET-CONTROLLED (HCC): ICD-10-CM

## 2020-11-12 DIAGNOSIS — M51.36 DDD (DEGENERATIVE DISC DISEASE), LUMBAR: ICD-10-CM

## 2020-11-12 DIAGNOSIS — E66.01 SEVERE OBESITY (HCC): ICD-10-CM

## 2020-11-12 LAB
ALBUMIN SERPL-MCNC: 3.8 G/DL (ref 3.5–5)
ALBUMIN/GLOB SERPL: 1.1 {RATIO} (ref 1.1–2.2)
ALP SERPL-CCNC: 115 U/L (ref 45–117)
ALT SERPL-CCNC: 24 U/L (ref 12–78)
ANION GAP SERPL CALC-SCNC: 7 MMOL/L (ref 5–15)
AST SERPL-CCNC: 20 U/L (ref 15–37)
BASOPHILS # BLD: 0 K/UL (ref 0–0.1)
BASOPHILS NFR BLD: 0 % (ref 0–1)
BILIRUB SERPL-MCNC: 0.9 MG/DL (ref 0.2–1)
BUN SERPL-MCNC: 19 MG/DL (ref 6–20)
BUN/CREAT SERPL: 13 (ref 12–20)
CALCIUM SERPL-MCNC: 9.4 MG/DL (ref 8.5–10.1)
CHLORIDE SERPL-SCNC: 105 MMOL/L (ref 97–108)
CO2 SERPL-SCNC: 31 MMOL/L (ref 21–32)
CREAT SERPL-MCNC: 1.42 MG/DL (ref 0.55–1.02)
CREAT UR-MCNC: 149 MG/DL
DIFFERENTIAL METHOD BLD: NORMAL
EOSINOPHIL # BLD: 0.1 K/UL (ref 0–0.4)
EOSINOPHIL NFR BLD: 1 % (ref 0–7)
ERYTHROCYTE [DISTWIDTH] IN BLOOD BY AUTOMATED COUNT: 13.9 % (ref 11.5–14.5)
EST. AVERAGE GLUCOSE BLD GHB EST-MCNC: 134 MG/DL
GLOBULIN SER CALC-MCNC: 3.5 G/DL (ref 2–4)
GLUCOSE POC: NORMAL MG/DL
GLUCOSE SERPL-MCNC: 176 MG/DL (ref 65–100)
HBA1C MFR BLD: 6.3 % (ref 4–5.6)
HCT VFR BLD AUTO: 40.1 % (ref 35–47)
HGB BLD-MCNC: 13.7 G/DL (ref 11.5–16)
IMM GRANULOCYTES # BLD AUTO: 0 K/UL (ref 0–0.04)
IMM GRANULOCYTES NFR BLD AUTO: 0 % (ref 0–0.5)
LYMPHOCYTES # BLD: 2.2 K/UL (ref 0.8–3.5)
LYMPHOCYTES NFR BLD: 33 % (ref 12–49)
MCH RBC QN AUTO: 28.7 PG (ref 26–34)
MCHC RBC AUTO-ENTMCNC: 34.2 G/DL (ref 30–36.5)
MCV RBC AUTO: 83.9 FL (ref 80–99)
MICROALBUMIN UR-MCNC: 11.5 MG/DL
MICROALBUMIN/CREAT UR-RTO: 77 MG/G (ref 0–30)
MONOCYTES # BLD: 0.5 K/UL (ref 0–1)
MONOCYTES NFR BLD: 7 % (ref 5–13)
NEUTS SEG # BLD: 3.9 K/UL (ref 1.8–8)
NEUTS SEG NFR BLD: 59 % (ref 32–75)
NRBC # BLD: 0 K/UL (ref 0–0.01)
NRBC BLD-RTO: 0 PER 100 WBC
PLATELET # BLD AUTO: 281 K/UL (ref 150–400)
PMV BLD AUTO: 10 FL (ref 8.9–12.9)
POTASSIUM SERPL-SCNC: 3.7 MMOL/L (ref 3.5–5.1)
PROT SERPL-MCNC: 7.3 G/DL (ref 6.4–8.2)
RBC # BLD AUTO: 4.78 M/UL (ref 3.8–5.2)
SODIUM SERPL-SCNC: 143 MMOL/L (ref 136–145)
WBC # BLD AUTO: 6.7 K/UL (ref 3.6–11)

## 2020-11-12 PROCEDURE — 83036 HEMOGLOBIN GLYCOSYLATED A1C: CPT

## 2020-11-12 PROCEDURE — 85025 COMPLETE CBC W/AUTO DIFF WBC: CPT

## 2020-11-12 PROCEDURE — 82043 UR ALBUMIN QUANTITATIVE: CPT

## 2020-11-12 PROCEDURE — 80053 COMPREHEN METABOLIC PANEL: CPT

## 2020-11-12 PROCEDURE — 99213 OFFICE O/P EST LOW 20 MIN: CPT | Performed by: NURSE PRACTITIONER

## 2020-11-12 PROCEDURE — 82962 GLUCOSE BLOOD TEST: CPT | Performed by: NURSE PRACTITIONER

## 2020-11-12 NOTE — PROGRESS NOTES
Coordination of Care  1. Have you been to the ER, urgent care clinic since your last visit? Hospitalized since your last visit? No    2. Have you seen or consulted any other health care providers outside of the [address] [place] [address] since your last visit? Include any pap smears or colon screening. No    Does the patient need refills? NO    Learning Assessment Complete?  yes  Depression Screening complete in the past 12 months? yes     Results for orders placed or performed in visit on 11/12/20   AMB POC GLUCOSE BLOOD, BY GLUCOSE MONITORING DEVICE   Result Value Ref Range    Glucose POC nf-194 mg/dL

## 2020-11-12 NOTE — Clinical Note
Dr. Sadaf Costa, when can we schedule this patient to see you for a knee injection? Please send to front office to schedule.   Thank you, Joanie Yanes

## 2020-11-16 NOTE — PROGRESS NOTES
Blood pressure was very high and it is starting to affect kidney function. Verify patient takes 1/2 of the hydrochlorothiazide 25 mg daily in addition to valsartan 160 mg and amlodipine 10 mg.  Drink sufficient water that your urine is clear. Return 1-2 months to recheck blood pressure (as F2F provider visit).

## 2020-11-23 DIAGNOSIS — I10 ESSENTIAL HYPERTENSION: ICD-10-CM

## 2020-11-23 RX ORDER — VALSARTAN 160 MG/1
320 TABLET ORAL DAILY
Qty: 180 TAB | Refills: 0 | Status: SHIPPED | OUTPATIENT
Start: 2020-11-23 | End: 2021-04-16 | Stop reason: SDUPTHER

## 2020-11-23 NOTE — TELEPHONE ENCOUNTER
Tc from the pt. She stated she was called by Laurie Ashley NP and a message was left for her to call this number. Th ept's chart was reviewed. She was given the providers message. That due to her HBP and Kidney function she needed to increase the Valsartan 160 mg 1 tablet by mouth daily to 2 tablets by mouth daily. Take both pills together in the am. The pt was told the provider recommended the pt to return to the clinic in 3-4 weeks non fasting. The pt was told a message would be routed to the registrar to schedule the appt.  Krista De La Rosa RN

## 2020-11-23 NOTE — TELEPHONE ENCOUNTER
Telephone call to patient, left message on answering machine to call the office. Patient needs to be told \"Due to your high blood pressure and kidney function, please increase your valsartan 160 mg 1 po daily to 2 po daily. We should have you return in 3-4 weeks for F2F visit, nonfasting. \"

## 2020-11-23 NOTE — PROGRESS NOTES
Patient's medications have been updated. Diagnoses and all orders for this visit:    1. Essential hypertension  -     valsartan (DIOVAN) 160 mg tablet; Take 2 Tabs by mouth daily. Current Outpatient Medications   Medication Sig Dispense Refill    valsartan (DIOVAN) 160 mg tablet Take 2 Tabs by mouth daily. 180 Tab 0    atorvastatin (LIPITOR) 80 mg tablet Take 1 Tab by mouth daily. 1 po daily 90 Tab 0    levothyroxine (SYNTHROID) 75 mcg tablet Take 1 Tab by mouth Daily (before breakfast). 90 Tab 0    amLODIPine (NORVASC) 10 mg tablet Take 1 Tab by mouth daily. 90 Tab 0    loratadine (CLARITIN) 10 mg tablet Take 1 Tab by mouth daily. For phlegm in throat 90 Tab 0    glipiZIDE (GLUCOTROL) 5 mg tablet 1 po qday BEFORE breakfast IF blood sugar measures 140 or higher 90 Tab 0    albuterol (PROVENTIL HFA, VENTOLIN HFA, PROAIR HFA) 90 mcg/actuation inhaler Take 1 Puff by inhalation every four (4) hours as needed for Wheezing. [facility] patient. Prefers [address] location. 1 Inhaler 1    fluticasone-salmeterol (ADVAIR) 250-50 mcg/dose diskus inhaler Take 1 Puff by inhalation every twelve (12) hours. 1 Inhaler 1    MULTIVITAMIN W-MINERALS/LUTEIN (CENTRUM SILVER PO) Take  by mouth. Takes one po daily       OTHER Salamol [facility] free inhaler as needed. Medication from [facility].

## 2020-11-25 ENCOUNTER — TELEPHONE (OUTPATIENT)
Dept: FAMILY MEDICINE CLINIC | Age: 80
End: 2020-11-25

## 2020-11-25 NOTE — PROGRESS NOTES
This message was regarding re-starting the HCTZ,  sent to the provider last week. The provider had increased her Losartan 160 mg to 2 tablets daily on 11/23/2020 for her high Blood pressure. Does she also need to start taking the HCTZ 1/2 tablet? HCTZ is not on the current medication list. The pt stated she discontinued in July. Please review. The provider is not available to ask today. The message will be sent to provider if the pt should restart the HCTZ? If she does need to restart the HCTZ,  she will need a new order for it. Routed to provider. See the previous telephone encounter to the pt from the [facility] with the providers message on 11/23/2020.   Krista De La Rosa RN

## 2020-11-25 NOTE — TELEPHONE ENCOUNTER
I called patient as indicated: \"The pt was told the provider recommended the pt to return to the clinic in 3-4 weeks non fasting. The pt was told a message would be routed to the registrar to schedule the appt.  Celeste Jacome RN\"    Patient did not answer, I left a voicemail message to please call back

## 2020-11-25 NOTE — PROGRESS NOTES
The provider was sent a message regarding the HCTZ weather she needs to re-start it. Dr Francisco Javier Sal stated lets leave off the HCTZ for now until she follows up in person.  Lisandro West RN

## 2020-11-30 NOTE — PROGRESS NOTES
Call back reg: please call pt to schedule follow up in 3 weeks. She did get your message but the voicemail was full and she could not leave a message. Pt called back and she knows not to  the HCTZ. She did try to return call from registrar to schedule follow up for December which is due in 3 weeks. She said the voicemail was full and she could not leave a message. I am rerouting a message to call back registrars to please schedule pt for her follow up.  Robert Madsen RN

## 2020-11-30 NOTE — PROGRESS NOTES
I tried to call pt and her daughter from [address] [place] [address]. I left a VM message on the pt's cell phone asking her to call office but letting her know to not restart the HCTZ and to discuss in person at next follow up.  Albertina Horton RN

## 2020-12-07 ENCOUNTER — OFFICE VISIT (OUTPATIENT)
Dept: FAMILY MEDICINE CLINIC | Age: 80
End: 2020-12-07

## 2020-12-07 ENCOUNTER — HOSPITAL ENCOUNTER (OUTPATIENT)
Dept: LAB | Age: 80
Discharge: HOME | End: 2020-12-07

## 2020-12-07 VITALS
BODY MASS INDEX: 36.29 KG/M2 | HEART RATE: 91 BPM | WEIGHT: 180 LBS | SYSTOLIC BLOOD PRESSURE: 138 MMHG | TEMPERATURE: 97.7 F | HEIGHT: 59 IN | DIASTOLIC BLOOD PRESSURE: 68 MMHG

## 2020-12-07 DIAGNOSIS — J45.20 MILD INTERMITTENT ASTHMA WITHOUT COMPLICATION: ICD-10-CM

## 2020-12-07 DIAGNOSIS — E11.9 DIABETES MELLITUS TYPE 2, DIET-CONTROLLED (HCC): Primary | ICD-10-CM

## 2020-12-07 DIAGNOSIS — G89.29 CHRONIC PAIN OF RIGHT KNEE: ICD-10-CM

## 2020-12-07 DIAGNOSIS — M25.561 CHRONIC PAIN OF RIGHT KNEE: ICD-10-CM

## 2020-12-07 DIAGNOSIS — R79.89 ELEVATED SERUM CREATININE: ICD-10-CM

## 2020-12-07 LAB — GLUCOSE POC: NORMAL MG/DL

## 2020-12-07 PROCEDURE — 36415 COLL VENOUS BLD VENIPUNCTURE: CPT

## 2020-12-07 PROCEDURE — 99213 OFFICE O/P EST LOW 20 MIN: CPT | Performed by: FAMILY MEDICINE

## 2020-12-07 PROCEDURE — 80048 BASIC METABOLIC PNL TOTAL CA: CPT

## 2020-12-07 PROCEDURE — 82962 GLUCOSE BLOOD TEST: CPT | Performed by: FAMILY MEDICINE

## 2020-12-07 RX ORDER — DICLOFENAC SODIUM 10 MG/G
GEL TOPICAL 4 TIMES DAILY
Qty: 100 G | Refills: 5 | Status: SHIPPED | OUTPATIENT
Start: 2020-12-07 | End: 2021-08-24 | Stop reason: SDUPTHER

## 2020-12-07 RX ORDER — ALBUTEROL SULFATE 90 UG/1
1 AEROSOL, METERED RESPIRATORY (INHALATION)
Qty: 1 INHALER | Refills: 5 | Status: SHIPPED | OUTPATIENT
Start: 2020-12-07 | End: 2022-10-06 | Stop reason: SDUPTHER

## 2020-12-07 NOTE — PROGRESS NOTES
Patient was seen by lab today   Blood was obtained   No questions or complications   By Dr Britta Lerma

## 2020-12-07 NOTE — PROGRESS NOTES
Coordination of Care  1. Have you been to the ER, urgent care clinic since your last visit? Hospitalized since your last visit? No    2. Have you seen or consulted any other health care providers outside of the [address] [place] [address] since your last visit? Include any pap smears or colon screening. No    Does the patient need refills? NO    Learning Assessment Complete?  yes  Depression Screening complete in the past 12 months? yes      Results for orders placed or performed in visit on 12/07/20   AMB POC GLUCOSE BLOOD, BY GLUCOSE MONITORING DEVICE   Result Value Ref Range    Glucose POC nf 124 mg/dL

## 2020-12-07 NOTE — PROGRESS NOTES
HISTORY OF PRESENT ILLNESS  Stephanie Floyd is a 79 y.o. female. HPI  Patient states she is having right knee pain, states she has had this problem for years. She is unable to take anything other than tylenol. Good with all of her medication refills except for albuterol. Last visit, her blood pressure was very high and had a problem with her kidneys. Her blood pressure medications were adjusted and was suppose to repeat blood tests. Review of Systems   Constitutional: Negative for chills, fever and weight loss. Eyes: Negative for blurred vision, pain, discharge and redness. Respiratory: Negative for cough, hemoptysis and sputum production. Cardiovascular: Negative for chest pain, palpitations, orthopnea and claudication. Gastrointestinal: Negative for heartburn, nausea and vomiting. Genitourinary: Negative for dysuria and urgency. Musculoskeletal: Positive for joint pain. Right knee joint pain   Skin: Negative for itching and rash. Neurological: Negative for dizziness, tingling and headaches. /68 (BP 1 Location: Left arm, BP Patient Position: Sitting)   Pulse 91   Temp 97.7 °F (36.5 °C) (Temporal)   Ht 4' 11.37\" (1.508 m)   Wt 180 lb (81.6 kg)   BMI 35.90 kg/m²   Physical Exam  Cardiovascular:      Rate and Rhythm: Normal rate and regular rhythm. Heart sounds: Normal heart sounds. Pulmonary:      Effort: Pulmonary effort is normal. No respiratory distress. Breath sounds: Normal breath sounds. No wheezing or rhonchi. Musculoskeletal:         General: Tenderness and deformity present. No swelling. Comments: There is right knee joint deformity,  Crepitus,  Pain to ROM         ASSESSMENT and PLAN  Diagnoses and all orders for this visit:    1. Diabetes mellitus type 2, diet-controlled (HCC)  -     AMB POC GLUCOSE BLOOD, BY GLUCOSE MONITORING DEVICE    2. Chronic pain of right knee  -     diclofenac (VOLTAREN) 1 % gel;  Apply  to affected area four (4) times daily. Right knee    3. Elevated serum creatinine  -     METABOLIC PANEL, BASIC; Future    4. Mild intermittent asthma without complication  -     albuterol (PROVENTIL HFA, VENTOLIN HFA, PROAIR HFA) 90 mcg/actuation inhaler; Take 1 Puff by inhalation every four (4) hours as needed for Wheezing.       We will write for voltaren topical gel,  Will order kenalog and contact the patient once is available for injection  Albuterol refilled  Check bmp  Follow up as needed

## 2020-12-08 LAB
ANION GAP SERPL CALC-SCNC: 4 MMOL/L (ref 5–15)
BUN SERPL-MCNC: 21 MG/DL (ref 6–20)
BUN/CREAT SERPL: 13 (ref 12–20)
CALCIUM SERPL-MCNC: 9.5 MG/DL (ref 8.5–10.1)
CHLORIDE SERPL-SCNC: 107 MMOL/L (ref 97–108)
CO2 SERPL-SCNC: 31 MMOL/L (ref 21–32)
CREAT SERPL-MCNC: 1.65 MG/DL (ref 0.55–1.02)
GLUCOSE SERPL-MCNC: 104 MG/DL (ref 65–100)
POTASSIUM SERPL-SCNC: 4.8 MMOL/L (ref 3.5–5.1)
SODIUM SERPL-SCNC: 142 MMOL/L (ref 136–145)

## 2020-12-09 NOTE — PROGRESS NOTES
Labs show the kidney function is altered still. We will need to reassess in 4 weeks. Patient can be informed.   Please offer a face to face visit in 4 weeks

## 2020-12-10 ENCOUNTER — TELEPHONE (OUTPATIENT)
Dept: FAMILY MEDICINE CLINIC | Age: 80
End: 2020-12-10

## 2020-12-10 NOTE — PROGRESS NOTES
I apologize if this is a duplicate. Please schedule pt for f2f with Migel Toscano in 4 weeks per his request. Thank you!  Roscoe Garcia RN

## 2020-12-10 NOTE — TELEPHONE ENCOUNTER
----- Message from Nia Ochoa, RN sent at 12/10/2020  1:45 PM EST -----  I called pt and spoke to her daughter, Federica FRITZ, from her [facility] as pt did not answer phone. I gave message from Dr. Angelita Beltran. They informed me that pt had an Access Now referral done by the [facility] back at the beginning of the year which sent the pt to Dr. Erika Garcia, nephrologist. He sent pt to Dr. Quentin Rodriguez. [place] [facility] [place] [facility] and had ultrasounds of kidneys and MRI as well. Dr. Saritha Valenzuela told her that the MRI compared to 10 years ago showed a small change but that it was not cancer and that she should follow up with him in 6 months. ( I think she saw him back in August). Betina said that this pt's Access Now has  and that we need a new referral to nephrology for this pt to follow back up with Dr. Kely Bashir as she is also supposed to schedule that follow up and has not done so. I am asking Michaela to send pt's 3 GÓMEZ so we can get [facility]' ultrasounds and MRI from this year. Also, asking to send GÓMEZ for both Dr. Kely Bashir and Dr. Saritha Valenzuela. ( I put them on her treatment team). Betina is able to find and print the February and April office notes from Dr. Kely Bashir and she will get them scanned into pt's chart. Routing to Dr. Angelia Lopez, Call back nurse weldon and to Candice Garcia who saw patient twice since November.

## 2020-12-10 NOTE — PROGRESS NOTES
I have routed a message to call back reg to please schedule pt for f2f fup in 4 weeks with Dr. Karol Sinclair.  Katherine Powell RN

## 2020-12-10 NOTE — PROGRESS NOTES
I called pt and spoke to her daughter, Deja Munguia, from her [facility] as pt did not answer phone. I gave message from Dr. Francisco Javier Sal. They informed me that pt had an Access Now referral done by the [facility] back at the beginning of the year which sent the pt to Dr. Danielle Stroud, nephrologist. He sent pt to Dr. Angela Mata. Pt went to [facility] doctor's and had ultrasounds of kidneys and MRI as well. Dr. Keshawn Hernandez told her that the MRI compared to 10 years ago showed a small change but that it was not cancer and that she should follow up with him in 6 months. ( I think she saw him back in August). Betina said that this pt's Access Now has  and that we need a new referral to nephrology for this pt to follow back up with Dr. Kya Aponte as she is also supposed to schedule that follow up and has not done so. I am asking Michaela to send pt's 3 GÓMEZ so we can get [facility]' ultrasounds and MRI from this year. Also, asking to send GÓMEZ for both Dr. Kya Aponte and Dr. Keshawn Hernandez. ( I put them on her treatment team). Betina is able to find and print the February and April office notes from Dr. Kya Aponte and she will get them scanned into pt's chart. Routing to Dr. Coral Ibarra, Call back nurse pool and to Vladislav Hogue who saw patient twice since November.

## 2020-12-14 NOTE — TELEPHONE ENCOUNTER
Mailed the 3 GÓMEZ's requested. For [facility], Dr Rah Martinez and Dr Sandor Peacock. Enclosed a self addressed and stamped envelope for the pt to mail the signed GÓMEZ's back to the [facility] office in.  Kranthi Aguilar RN

## 2020-12-16 ENCOUNTER — TELEPHONE (OUTPATIENT)
Dept: FAMILY MEDICINE CLINIC | Age: 80
End: 2020-12-16

## 2020-12-16 NOTE — TELEPHONE ENCOUNTER
Josh Strauss  Was contacted 12/16/2020 to schedule a F2F apt with [facility] no answer left a VM .     Thank you   Dottie Johnson

## 2020-12-17 ENCOUNTER — TELEPHONE (OUTPATIENT)
Dept: FAMILY MEDICINE CLINIC | Age: 80
End: 2020-12-17

## 2020-12-17 NOTE — TELEPHONE ENCOUNTER
Patient, Donato Nowak  Returned your call from Wednesday. Please call her again to schedule the f2f appointment with Dr. Maddie Armstrong.     Michaela Whyte April

## 2020-12-18 ENCOUNTER — TELEPHONE (OUTPATIENT)
Dept: FAMILY MEDICINE CLINIC | Age: 80
End: 2020-12-18

## 2020-12-21 ENCOUNTER — TELEPHONE (OUTPATIENT)
Dept: FAMILY MEDICINE CLINIC | Age: 80
End: 2020-12-21

## 2020-12-21 NOTE — TELEPHONE ENCOUNTER
Tc from the pt The pt.identified herself by name and . The pt asked to speak with Daron Orellana DNP. The pt was told she was working with the clinic today with patients could I send her a message. The pt stated she picked up her Rx and the bottle on the Rx Losartan 160 mg says take 1 tablet daily. She was called on the phone by a nurse back in November and told to increase her Losartan 160 mg tablets to 2 tablets a day. The pt asked which should she be taking Losartan 160 mg, 1 tablet or 2 tablets? The pt's chart was reviewed. The last note form the provider regarding her Losartan and the last Losartan ordered was Losartan 160 mg, take 2 tablets by mouth daily. This is read to the pt. The pt stated well why does my rx say take 1 tablet daily. The pt's Pharmacy was called. The Pharmacist stated she does have the Rx from 2020 for Losartan that says Losartan 160 mg 2 tablets po daily, but the pt still had old rx's that were not deactivated in her file for the Losartan 160 mg 1 tablet daily. The Pharmacist stated the pt probably called in rx refill and they Pharmacist filled the Rx from the Rx written in 2020. The Pharmacist deactivated the old Rx and activated the new one. The Pharamcist told the pt when she is getting low to call the Pharmacy and she will refill it and it will have the right directions on it. Pt verbalized understanding.  Berta Gordon RN

## 2020-12-28 ENCOUNTER — OFFICE VISIT (OUTPATIENT)
Dept: FAMILY MEDICINE CLINIC | Age: 80
End: 2020-12-28

## 2020-12-28 VITALS
BODY MASS INDEX: 35.34 KG/M2 | HEART RATE: 95 BPM | SYSTOLIC BLOOD PRESSURE: 150 MMHG | TEMPERATURE: 97.7 F | OXYGEN SATURATION: 99 % | HEIGHT: 60 IN | DIASTOLIC BLOOD PRESSURE: 76 MMHG | WEIGHT: 180 LBS

## 2020-12-28 DIAGNOSIS — N18.9 CHRONIC KIDNEY DISEASE, UNSPECIFIED CKD STAGE: ICD-10-CM

## 2020-12-28 DIAGNOSIS — M25.561 CHRONIC PAIN OF RIGHT KNEE: Primary | ICD-10-CM

## 2020-12-28 DIAGNOSIS — G89.29 CHRONIC PAIN OF RIGHT KNEE: Primary | ICD-10-CM

## 2020-12-28 DIAGNOSIS — E11.9 DIABETES MELLITUS TYPE 2, DIET-CONTROLLED (HCC): ICD-10-CM

## 2020-12-28 DIAGNOSIS — M65.311 TRIGGER FINGER OF RIGHT THUMB: ICD-10-CM

## 2020-12-28 LAB — GLUCOSE POC: NORMAL MG/DL

## 2020-12-28 PROCEDURE — 82962 GLUCOSE BLOOD TEST: CPT | Performed by: FAMILY MEDICINE

## 2020-12-28 PROCEDURE — 99213 OFFICE O/P EST LOW 20 MIN: CPT | Performed by: FAMILY MEDICINE

## 2020-12-28 NOTE — PROGRESS NOTES
Coordination of Care  1. Have you been to the ER, urgent care clinic since your last visit? Hospitalized since your last visit? No    2. Have you seen or consulted any other health care providers outside of the [address] [place] [address] since your last visit? Include any pap smears or colon screening. No    Does the patient need refills? NO    Learning Assessment Complete?  yes  Depression Screening complete in the past 12 months? yes  Results for orders placed or performed in visit on 12/28/20   AMB POC GLUCOSE BLOOD, BY GLUCOSE MONITORING DEVICE   Result Value Ref Range    Glucose POC fasting 120 mg/dL

## 2020-12-28 NOTE — PROGRESS NOTES
HISTORY OF PRESENT ILLNESS  Martha Silva is a 79 y.o. female. HPI  Right knee pain. Here today for knee injection. She has also noticed a problem with her right thumb. It gets stuck when she flexes it and then it makes a sound when she extends it. She needs to go back to nephrology but her access now has   Review of Systems   Constitutional: Negative for chills, fever and weight loss. Eyes: Negative for blurred vision, pain, discharge and redness. Respiratory: Negative for cough, hemoptysis and sputum production. Cardiovascular: Negative for chest pain, palpitations, orthopnea and claudication. Gastrointestinal: Negative for heartburn, nausea and vomiting. Genitourinary: Negative for dysuria and urgency. Musculoskeletal: Positive for joint pain. Right knee joint pain  Trigger thumb, right   Skin: Negative for itching and rash. Neurological: Negative for dizziness, tingling and headaches. BP (!) 150/76 (BP 1 Location: Right arm, BP Patient Position: Sitting)   Pulse 95   Temp 97.7 °F (36.5 °C) (Temporal)   Ht 5' (1.524 m)   Wt 180 lb (81.6 kg)   SpO2 99%   BMI 35.15 kg/m²   Physical Exam  Cardiovascular:      Rate and Rhythm: Normal rate and regular rhythm. Heart sounds: Normal heart sounds. Pulmonary:      Effort: Pulmonary effort is normal. No respiratory distress. Breath sounds: Normal breath sounds. No wheezing or rhonchi. Musculoskeletal:         General: Tenderness and deformity present. No swelling. Comments: There is right knee joint deformity,  Crepitus,  Pain to ROM  Trigger thumb, right         ASSESSMENT and PLAN  Diagnoses and all orders for this visit:    1. Chronic pain of right knee    2. Diabetes mellitus type 2, diet-controlled (HCC)  -     AMB POC GLUCOSE BLOOD, BY GLUCOSE MONITORING DEVICE    3. Chronic kidney disease, unspecified CKD stage  -     REFERRAL TO NEPHROLOGY    4.  Trigger finger of right thumb  -     REFERRAL TO HAND SURGERY      40 mg kenalog in 2 mL 1% lidocaine injected to the right knee, patient tolerated the procedure well

## 2021-01-04 ENCOUNTER — OFFICE VISIT (OUTPATIENT)
Dept: FAMILY MEDICINE CLINIC | Age: 81
End: 2021-01-04

## 2021-01-04 DIAGNOSIS — Z71.89 COUNSELING AND COORDINATION OF CARE: Primary | ICD-10-CM

## 2021-01-04 PROCEDURE — 99080 SPECIAL REPORTS OR FORMS: CPT | Performed by: NURSE PRACTITIONER

## 2021-01-05 ENCOUNTER — OFFICE VISIT (OUTPATIENT)
Dept: FAMILY MEDICINE CLINIC | Age: 81
End: 2021-01-05

## 2021-01-05 DIAGNOSIS — Z71.89 COUNSELING AND COORDINATION OF CARE: Primary | ICD-10-CM

## 2021-01-05 PROCEDURE — 99080 SPECIAL REPORTS OR FORMS: CPT | Performed by: FAMILY MEDICINE

## 2021-01-05 NOTE — PROGRESS NOTES
Patient has been prescreened for AN. Patient has been scheduled for F2F appointment to complete application.

## 2021-01-05 NOTE — PROGRESS NOTES
Spoke to patient regarding her AN referral. Patient stated her daughter was the one who is aware of all her appointments and would have daughter call me back to schedule AN financial screening appointment.

## 2021-01-11 ENCOUNTER — OFFICE VISIT (OUTPATIENT)
Dept: FAMILY MEDICINE CLINIC | Age: 81
End: 2021-01-11

## 2021-01-11 DIAGNOSIS — E03.9 HYPOTHYROIDISM: ICD-10-CM

## 2021-01-11 DIAGNOSIS — I10 ESSENTIAL HYPERTENSION: ICD-10-CM

## 2021-01-11 DIAGNOSIS — Z71.89 COUNSELING AND COORDINATION OF CARE: Primary | ICD-10-CM

## 2021-01-11 DIAGNOSIS — E11.9 TYPE 2 DIABETES MELLITUS WITHOUT COMPLICATION, WITHOUT LONG-TERM CURRENT USE OF INSULIN (HCC): ICD-10-CM

## 2021-01-11 PROCEDURE — 99080 SPECIAL REPORTS OR FORMS: CPT | Performed by: NURSE PRACTITIONER

## 2021-01-11 NOTE — PROGRESS NOTES
AN financial screening has been completed. Patient has been instructed to call AN appointment line 1/25/2020.

## 2021-01-12 DIAGNOSIS — N28.89 RENAL MASS, LEFT: Primary | ICD-10-CM

## 2021-01-12 RX ORDER — LEVOTHYROXINE SODIUM 75 UG/1
TABLET ORAL
Qty: 90 TAB | Refills: 0 | Status: SHIPPED | OUTPATIENT
Start: 2021-01-12 | End: 2021-03-29

## 2021-01-12 RX ORDER — AMLODIPINE BESYLATE 10 MG/1
TABLET ORAL
Qty: 90 TAB | Refills: 0 | Status: SHIPPED | OUTPATIENT
Start: 2021-01-12 | End: 2021-04-26

## 2021-01-12 RX ORDER — GLIPIZIDE 5 MG/1
TABLET ORAL
Qty: 90 TAB | Refills: 0 | Status: SHIPPED | OUTPATIENT
Start: 2021-01-12 | End: 2021-05-24

## 2021-01-13 ENCOUNTER — TELEPHONE (OUTPATIENT)
Dept: FAMILY MEDICINE CLINIC | Age: 81
End: 2021-01-13

## 2021-01-13 NOTE — TELEPHONE ENCOUNTER
Telephone call to patient's daughter Stephanie Peres on patient's permission to release all information form to discuss the renal mass seen on an ultrasound in 02/2020. The notes I have received state  That patient should have a follow up MRI to rule out neoplasm. Stephanie Peres reported to me that patient has since seen Dr. Matt Mcclendon who performed an MRI and compared it to records they had on file from 10 years ago. She was told that the mass \"had grown some  but not a lot\". The recommendation at that time was to return in 6 months for follow up. I was not able to locate these notes in patient's medical record. I am reaching out to [facility] to help locate these records and confirm that no additional actions are needed at this time regarding the renal mass. Daughter expressed appreciation for the call.   Time: 15 minutes  Mook Hodges NP

## 2021-01-15 ENCOUNTER — TELEPHONE (OUTPATIENT)
Dept: FAMILY MEDICINE CLINIC | Age: 81
End: 2021-01-15

## 2021-01-15 NOTE — TELEPHONE ENCOUNTER
----- Message from Marine Lopez RN sent at 1/14/2021  9:28 AM EST -----  Regarding: RE: [facility] needed for Renal Mass follow up? Hello,    I believe that part of the gap in this patient's record comes from the fact that she enrolled in [facility] as a [facility] patient in December, 2019. They referred her to Nephrology and she was seen by Dr. Debra Early on 02- and 04- The notes from both office visits were scanned into the patient's CC chart on 12-. In April, 2020, Dr. Brittney Howell referred the patient to Urology through [facility]. Patient has been seeing Dr. Pita Rivera at [facility] and we recently sent them a records request. I called them today and was told that the patient's last office visit with Dr. Libby Monreal was a telemedicine appointment on 08-. She also had an MRI done on 08- that he ordered. Our request for records was sent to their third party records management provider because we asked for the patient's entire record to be mailed to the [facility] office. (I sent a staff message to Neto Villanueva today asking if we could specify faxing on our records requests rather that mailing.)  Dr. Aditi Ayala office will fax the notes for the Aug. Office visit and MRI to the [facility] office today. The patient recently completed her enrollment renewal for [facility] which I will be sending in to them by tomorrow. She has a new referral for Hand Surgery and will be able to continue seeing her Nephrologist and Urologist as well.    Pawel    ----- Message -----  From: Lance Martel NP  Sent: 1/13/2021   2:36 PM EST  To: Marine Lopez RN  Subject: [facility] needed for Renal Mass follow up? Adelina Moffett,  I called patient's daughter Nuno Ball on patient's permission to release all information form to clarify the medical history about the renal mass. I am not yet seeing these notes from Urologist Dr. Pita Rivera.   My understanding from Nuno Ball is that Dr. Libby Monreal has done a follow up MRI and compared the mass with records they had on file from 10 years ago. Apparently the mass has grown some but \"not a lot. \"  The recommendation was to return to Dr. Travis Nguyen in 6 months. My question is, has the daughter signed for the release of records from Dr. Samuel Garcia? It's possible they are in the chart and I overlooked them. I also wanted to make sure we are not overlooking an additional [facility] referral that may be needed for Urology to follow up on the renal mass. Thank you for checking into this.     --Jud Kuhn

## 2021-01-22 ENCOUNTER — DOCUMENTATION ONLY (OUTPATIENT)
Dept: FAMILY MEDICINE CLINIC | Age: 81
End: 2021-01-22

## 2021-01-22 NOTE — PROGRESS NOTES
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 Re-evaluate in 6 months.

## 2021-02-03 ENCOUNTER — TELEPHONE (OUTPATIENT)
Dept: FAMILY MEDICINE CLINIC | Age: 81
End: 2021-02-03

## 2021-02-03 NOTE — TELEPHONE ENCOUNTER
The message was sent per Ranjit Del Angel RN to the front office and Graciela Mcclure LPN for scheduling of Covid Vaccine for the [facility] on Friday 02/05/21.  Brijesh Harris RN

## 2021-02-03 NOTE — TELEPHONE ENCOUNTER
Tc to the pt's Dtr Sheryle Dus. She was told the message had been sent to the Vaccine coordinator that the pt's was put on a list that she had asked to get the vaccine. She should be getting a call from the registrar to confirm and schedule the Covid Vaccine appt.  Ros Herbert RN

## 2021-02-03 NOTE — TELEPHONE ENCOUNTER
Tc from the dtr Paige Grier asking if the pt may get the Covid vaccine. Routed this to the Leadership team regarding the upcoming Covid vaccine clinic at the [facility].  Henrique Lacy RN

## 2021-02-04 ENCOUNTER — TELEPHONE (OUTPATIENT)
Dept: FAMILY MEDICINE CLINIC | Age: 81
End: 2021-02-04

## 2021-02-04 NOTE — TELEPHONE ENCOUNTER
Rescheduled the pt's Covid vaccine appt so her appt could be around the same time as her dtr's, mother in laws appt. Since the Dtr is driving them both of them to the appt's and she does not have to make 2 trips.  Franck Hsu RN

## 2021-02-05 ENCOUNTER — CLINICAL SUPPORT (OUTPATIENT)
Dept: FAMILY MEDICINE CLINIC | Age: 81
End: 2021-02-05

## 2021-02-05 DIAGNOSIS — Z23 ENCOUNTER FOR IMMUNIZATION: Primary | ICD-10-CM

## 2021-03-05 ENCOUNTER — IMMUNIZATION (OUTPATIENT)
Dept: FAMILY MEDICINE CLINIC | Age: 81
End: 2021-03-05

## 2021-03-05 DIAGNOSIS — Z23 ENCOUNTER FOR IMMUNIZATION: Primary | ICD-10-CM

## 2021-03-05 PROCEDURE — 91301 COVID-19, MRNA, LNP-S, PF, 100MCG/0.5ML DOSE(MODERNA): CPT

## 2021-03-05 PROCEDURE — 0012A COVID-19, MRNA, LNP-S, PF, 100MCG/0.5ML DOSE(MODERNA): CPT

## 2021-03-19 DIAGNOSIS — E03.9 HYPOTHYROIDISM: ICD-10-CM

## 2021-03-29 RX ORDER — LEVOTHYROXINE SODIUM 75 UG/1
TABLET ORAL
Qty: 90 TAB | Refills: 0 | Status: SHIPPED | OUTPATIENT
Start: 2021-03-29 | End: 2021-08-24 | Stop reason: SDUPTHER

## 2021-03-29 NOTE — TELEPHONE ENCOUNTER
Pt called the [facility] phone today and LM asking for her Levothyroxine refills. The pt was given the message that the Rx had been aproved and sent to the [facility] on [address] on 03/19/21. The pt verbalized understanding.  Brijesh Harris RN

## 2021-03-29 NOTE — TELEPHONE ENCOUNTER
Due for recheck of nonfasting labs which I have ordered. Note sent to registrars to please schedule.

## 2021-03-29 NOTE — PROGRESS NOTES
Diagnoses and all orders for this visit:    1. Acquired hypothyroidism  -     TSH 3RD GENERATION; Future    2. Chronic kidney disease, unspecified CKD stage  -     METABOLIC PANEL, COMPREHENSIVE;  Future

## 2021-03-30 NOTE — TELEPHONE ENCOUNTER
Tc to the pt yesterday. I told her the rx refill request would be sent to the provider and to check with her Pharmacy by phone to see if the refill is ready either last night or today. The chart was reviewed this am. The refill was sent yesterday.  Ros Herbert RN

## 2021-03-31 ENCOUNTER — TELEPHONE (OUTPATIENT)
Dept: FAMILY MEDICINE CLINIC | Age: 81
End: 2021-03-31

## 2021-03-31 NOTE — TELEPHONE ENCOUNTER
Patient is seeing Nephrology through [facility] and Benjamin Kirkpatrick sent me a message recently asking about the patient's most recent visit and labs. T/C made to [facility], [phone]. I spoke with Blanca Valadez who stated that the patient had labs done on 02-24-21 and she will fax them to the [facility] office, [phone], today. She also stated that the patient had a virtual visit with Dr. Ruslan Lopez on 03-04-21 and she will fax the note to the [facility] office as soon as it has been closed by the provider. Kimberly Washington RN

## 2021-04-06 DIAGNOSIS — I10 ESSENTIAL HYPERTENSION: ICD-10-CM

## 2021-04-12 RX ORDER — VALSARTAN 160 MG/1
TABLET ORAL
Qty: 180 TAB | Refills: 0 | OUTPATIENT
Start: 2021-04-12

## 2021-04-12 NOTE — TELEPHONE ENCOUNTER
Needs med requisition (can be VV or F2F) regarding other medications she may be taking from [facility].

## 2021-04-16 ENCOUNTER — TELEPHONE (OUTPATIENT)
Dept: FAMILY MEDICINE CLINIC | Age: 81
End: 2021-04-16

## 2021-04-16 DIAGNOSIS — I10 ESSENTIAL HYPERTENSION: ICD-10-CM

## 2021-04-16 RX ORDER — VALSARTAN 160 MG/1
320 TABLET ORAL DAILY
Qty: 180 TAB | Refills: 0 | Status: SHIPPED | OUTPATIENT
Start: 2021-04-16 | End: 2021-07-08

## 2021-04-16 NOTE — TELEPHONE ENCOUNTER
Tc to the pt no answer. I was returning her call to the [facility] phone. She had called yesterday and left a message regarding a refill of her medication. The pt was left the message to call the [facility] back and let us know the medication she is requesting for refill and also to schedule an appt with the provider.  Alexey Garber RN

## 2021-04-19 ENCOUNTER — TELEPHONE (OUTPATIENT)
Dept: FAMILY MEDICINE CLINIC | Age: 81
End: 2021-04-19

## 2021-04-19 NOTE — TELEPHONE ENCOUNTER
Tc from the pt she stated she is calling the nurse back because she got a message to call back. The chart was reviewed. She was told the provider sent her refills but she is requesting the pt schedule an appt she can have VV or F2F. The pt prefers F2F. The pt was given the next 2 avail dates the provider has some appt slots open right now. The pt stated she would ask her Dtr which appt she can bring her to and call this nurse back and schedule the appt.  Masood Montero RN

## 2021-04-19 NOTE — TELEPHONE ENCOUNTER
Tc from the pt she left a message on the [facility] phone that she prefers May 14 the at 2:00 pm. The pt was called back and was scheduled for the appt. May 14th at 2:15 pm with Elvis Melendez NP. At the [facility] [place] [address].  Lb Mosher RN

## 2021-04-22 DIAGNOSIS — I10 ESSENTIAL HYPERTENSION: ICD-10-CM

## 2021-04-26 RX ORDER — AMLODIPINE BESYLATE 10 MG/1
TABLET ORAL
Qty: 90 TAB | Refills: 0 | Status: SHIPPED | OUTPATIENT
Start: 2021-04-26 | End: 2021-08-24

## 2021-05-24 DIAGNOSIS — E11.9 TYPE 2 DIABETES MELLITUS WITHOUT COMPLICATION, WITHOUT LONG-TERM CURRENT USE OF INSULIN (HCC): ICD-10-CM

## 2021-05-24 RX ORDER — GLIPIZIDE 5 MG/1
TABLET ORAL
Qty: 90 TABLET | Refills: 0 | Status: SHIPPED | OUTPATIENT
Start: 2021-05-24 | End: 2021-08-31

## 2021-07-08 DIAGNOSIS — I10 ESSENTIAL HYPERTENSION: ICD-10-CM

## 2021-07-08 RX ORDER — VALSARTAN 160 MG/1
TABLET ORAL
Qty: 180 TABLET | Refills: 0 | Status: SHIPPED | OUTPATIENT
Start: 2021-07-08 | End: 2021-08-24 | Stop reason: SDUPTHER

## 2021-08-19 ENCOUNTER — TELEPHONE (OUTPATIENT)
Dept: FAMILY MEDICINE CLINIC | Age: 81
End: 2021-08-19

## 2021-08-19 NOTE — TELEPHONE ENCOUNTER
Tc from the pt's Dtr in law. Keya Niño. She is on the PHI. She is requesting an appt for the pt. She has been seeing a Nephrologist, and she has reported her sxs to Dr Vasile Mcclendon, and he would like her to be seen by her PCP. Dr Vasile Mcclendon has told her she should be seen every quarter by the PCP. The pt is experiencing pain all over. Ms Velvet Morrison stated it has been a while since the pt has had an appt with the [facility] provider.  The pt was scheduled for an appt with the provider on 08/25/21, at 2:50 pm. Edgar Patterson RN

## 2021-08-23 DIAGNOSIS — E11.9 TYPE 2 DIABETES MELLITUS WITHOUT COMPLICATION, WITHOUT LONG-TERM CURRENT USE OF INSULIN (HCC): ICD-10-CM

## 2021-08-23 PROBLEM — E11.22 TYPE 2 DIABETES MELLITUS WITH CHRONIC KIDNEY DISEASE (HCC): Status: ACTIVE | Noted: 2021-08-23

## 2021-08-23 RX ORDER — GLIPIZIDE 5 MG/1
TABLET ORAL
Qty: 90 TABLET | Refills: 0 | OUTPATIENT
Start: 2021-08-23

## 2021-08-24 ENCOUNTER — OFFICE VISIT (OUTPATIENT)
Dept: FAMILY MEDICINE CLINIC | Age: 81
End: 2021-08-24

## 2021-08-24 VITALS
TEMPERATURE: 98 F | OXYGEN SATURATION: 98 % | BODY MASS INDEX: 32.87 KG/M2 | WEIGHT: 174.13 LBS | HEART RATE: 93 BPM | SYSTOLIC BLOOD PRESSURE: 171 MMHG | DIASTOLIC BLOOD PRESSURE: 81 MMHG | HEIGHT: 61 IN

## 2021-08-24 DIAGNOSIS — E78.2 MIXED HYPERLIPIDEMIA: ICD-10-CM

## 2021-08-24 DIAGNOSIS — G89.29 CHRONIC PAIN OF RIGHT KNEE: ICD-10-CM

## 2021-08-24 DIAGNOSIS — E11.22 TYPE 2 DIABETES MELLITUS WITH STAGE 3B CHRONIC KIDNEY DISEASE, WITHOUT LONG-TERM CURRENT USE OF INSULIN (HCC): ICD-10-CM

## 2021-08-24 DIAGNOSIS — M54.41 CHRONIC BILATERAL LOW BACK PAIN WITH RIGHT-SIDED SCIATICA: ICD-10-CM

## 2021-08-24 DIAGNOSIS — N18.32 TYPE 2 DIABETES MELLITUS WITH STAGE 3B CHRONIC KIDNEY DISEASE, WITHOUT LONG-TERM CURRENT USE OF INSULIN (HCC): ICD-10-CM

## 2021-08-24 DIAGNOSIS — E11.9 ENCOUNTER FOR DIABETIC FOOT EXAM (HCC): ICD-10-CM

## 2021-08-24 DIAGNOSIS — I10 ESSENTIAL HYPERTENSION: Primary | ICD-10-CM

## 2021-08-24 DIAGNOSIS — E03.9 HYPOTHYROIDISM: ICD-10-CM

## 2021-08-24 DIAGNOSIS — M79.10 MYALGIA: ICD-10-CM

## 2021-08-24 DIAGNOSIS — G89.29 CHRONIC BILATERAL LOW BACK PAIN WITH RIGHT-SIDED SCIATICA: ICD-10-CM

## 2021-08-24 DIAGNOSIS — E11.9 TYPE 2 DIABETES MELLITUS WITHOUT COMPLICATION, WITHOUT LONG-TERM CURRENT USE OF INSULIN (HCC): ICD-10-CM

## 2021-08-24 DIAGNOSIS — M25.561 CHRONIC PAIN OF RIGHT KNEE: ICD-10-CM

## 2021-08-24 LAB — GLUCOSE POC: 112 MG/DL

## 2021-08-24 PROCEDURE — 99215 OFFICE O/P EST HI 40 MIN: CPT | Performed by: FAMILY MEDICINE

## 2021-08-24 PROCEDURE — 82962 GLUCOSE BLOOD TEST: CPT | Performed by: FAMILY MEDICINE

## 2021-08-24 RX ORDER — VALSARTAN 160 MG/1
TABLET ORAL
Qty: 180 TABLET | Refills: 1 | Status: SHIPPED | OUTPATIENT
Start: 2021-08-24 | End: 2022-03-01

## 2021-08-24 RX ORDER — AMLODIPINE BESYLATE 5 MG/1
10 TABLET ORAL DAILY
Qty: 180 TABLET | Refills: 1 | Status: SHIPPED | OUTPATIENT
Start: 2021-08-24 | End: 2022-03-07 | Stop reason: SDUPTHER

## 2021-08-24 RX ORDER — LEVOTHYROXINE SODIUM 75 UG/1
75 TABLET ORAL
Qty: 90 TABLET | Refills: 1 | Status: SHIPPED | OUTPATIENT
Start: 2021-08-24 | End: 2022-03-28

## 2021-08-24 RX ORDER — ATORVASTATIN CALCIUM 80 MG/1
80 TABLET, FILM COATED ORAL DAILY
Qty: 90 TABLET | Refills: 1 | Status: SHIPPED | OUTPATIENT
Start: 2021-08-24 | End: 2022-03-07 | Stop reason: SDUPTHER

## 2021-08-24 RX ORDER — DICLOFENAC SODIUM 10 MG/G
4 GEL TOPICAL 4 TIMES DAILY
Qty: 100 G | Refills: 5 | Status: SHIPPED | OUTPATIENT
Start: 2021-08-24 | End: 2022-03-07 | Stop reason: SDUPTHER

## 2021-08-24 NOTE — PROGRESS NOTES
Dae Kearns is a 79 y.o. female    Chief Complaint   Patient presents with    Follow-up     Type 2 diabetic, HTN;    Headache     top of head pain, lower back pain, lasting for several years, but worsen over last 2 months; had cortison shot in rt knee (done at [facility]), also pain in rt thigh;     Numbness     finger tips; bilateral       1. Have you been to the ER, urgent care clinic since your last visit? Hospitalized since your last visit? No    2. Have you seen or consulted any other health care providers outside of the [address] [place] [address] since your last visit? Include any pap smears or colon screening. Dr Ida Killian; Dr Kerline Mccormack at [facility];      Visit Vitals  BP (!) 171/81 (BP 1 Location: Left upper arm, BP Patient Position: Sitting)   Pulse 93   Temp 98 °F (36.7 °C) (Temporal)   Ht 5' 1\" (1.549 m)   Wt 174 lb 2 oz (79 kg)   SpO2 98%   BMI 32.90 kg/m²

## 2021-08-24 NOTE — PROGRESS NOTES
I have copied the provider's check out note here and have reviewed these items with the patient today: Check-out Note: Labs     [facility] for Diabetic Eye Exam.     Refills sent to [facility] (good rx)     Increase amlodipine to 10mg daily. F/U appointment for knee injection at pt's convenience. 0 Return in about 2 weeks (around 9/7/2021) for Right knee injection . I have printed AVS and reviewed it with patient today. I reviewed the patients information with her and her medications and referral to [address] [place] [address], [id number]. Patient verbalized understanding.  Ciera Schilling, RN

## 2021-08-24 NOTE — PROGRESS NOTES
Ziyad Langley is a 79 y.o. female   Chief Complaint   Patient presents with    Follow-up     Type 2 diabetic, HTN;    Headache     top of head pain, lower back pain, lasting for several years, but worsen over last 2 months; had cortison shot in rt knee (done at [facility]), also pain in rt thigh;     Numbness     finger tips; bilateral         ASSESSMENT AND PLAN:    1. Type 2 diabetes mellitus with [id number] CKD  Well controlled on glipizide qam for BG>140 (pt has been taking it daily regardless) with home FBG between 104-142 this month. Due for A1C - draw today with additional screening labs. Due for diabetic ophtho exam - referred to [facility]  Foot exam performed today, WNL. Continue Glipizide, as instructed for FBG>140    - AMB POC GLUCOSE BLOOD, BY GLUCOSE MONITORING DEVICE  - LIPID PANEL; Future  - HEMOGLOBIN A1C WITH EAG; Future  - REFERRAL TO OPHTHALMOLOGY    2. Essential hypertension  As noted by her Nephrologist, she has an element of [place] HTN. Her meticulous home BP log shows Bps from 111-166/64-97. The higher values are outliers. Mostly less than 920 systolic. PT interested in improving control -  Will increase Amlodipine back to 10mg since it has been well-tolerated and her Chlorthaladone was decreased to 12.5; So current combination will be: Amlodipine 10mg daily, Chlorthalidone 12.5mg daily and Valsartan 320mg daily. Follow up in 4 weeks for repeat BP      - METABOLIC PANEL, COMPREHENSIVE; Future  - valsartan (DIOVAN) 160 mg tablet; TAKE TWO TABLETS BY MOUTH DAILY  Indications: high blood pressure  Dispense: 180 Tablet; Refill: 1  - amLODIPine (NORVASC) 5 mg tablet; Take 2 Tablets by mouth daily. Indications: high blood pressure  Dispense: 180 Tablet; Refill: 1    3. Mixed hyperlipidemia  LIPID panel today  Continue lipitor 80mg    - atorvastatin (LIPITOR) 80 mg tablet; Take 1 Tablet by mouth daily. 1 po daily  Dispense: 90 Tablet; Refill: 1    4.  Hypothyroidism  I did not check the TSH box with her labs today, will need to check it at next visit. Continue synthroid at 75mcg for now    - levothyroxine (SYNTHROID) 75 mcg tablet; Take 1 Tablet by mouth Daily (before breakfast) for 180 days. Dispense: 90 Tablet; Refill: 1    5. Myalgia  Will get ESR/CRP to assess for PMR. Vit D as well. F/U pending results. - VITAMIN D, 25 HYDROXY; Future  - SED RATE (ESR); Future  - CRP, HIGH SENSITIVITY; Future    6. Chronic pain of right knee  Topical Voltaren for knee and back. Follow-up appointment  for repeat knee injection. - diclofenac (VOLTAREN) 1 % gel; Apply 4 g to affected area four (4) times daily. Back  Dispense: 100 g; Refill: 5    7. Chronic bilateral low back pain with right-sided sciatica  Topical voltaren  Heating pads  Massage  Stretching/Gentle mobilization          SUBJECTIVE:    HPI:  Anisha Hector is a 79 y.o. female with controlled DM2, CKD (followed by nephropathy), HTN (uncontrolled), HLD (controlled), Hypothyroidism, and chronic R Knee and LBP  who presents with her daughter for a checkup. She has not been seen here since December 2020, when she received a knee injection. IT helped for about 3-4 months but the pain is back. She cannot stand for more than 5 minutes due to knee and LBP. The MSK pain is constant and not relieved with tylenol arthritis or lidocaine patches. She has been through physical therapy and gone to a chiropractor without relief. It radiates to her right hip and thigh. She has with her the most beautiful notebook where she lists all her medications and dosages grouped by diagnosis and contains a daily log of her blood pressure, pulse and FBG. She has a section that states her goal BP, what is high, and what is dangerously high. She has been seeing Dr Vasile Mcclendon, a nephrologist and he has adjusted her BP meds since her last visit her.   She is currently taking:  Amlodipine 5mg daily(decreased from 10mg when they started chlorthalidone)  Chlorthalidone 12.5 mg daily(decreased from 25mg because she felt dizzy on it)  Valsartan 320mg daily. She is tolerating this regimen well, but isn't happy that her Bps aren't at 120/80. Range is 111-166/64-97 for the month of august.  She clusters around 140/80. She denies chest pain, palpitations, peripheral edema. She is on glipizide 5mg. She was instructed to take it for FBG>140 but she has been taking it every day because her glucometer says that >118 is high. She denies dizziness, vision changes, polyuria/polydipsia, N/V and N/T to her feet. She does have N/T to her fingertips -all of them --only the tips not her fingers or hand. It is constant. Her sister was recently diagnosed with PMR and she wonders if she might have it since their pain is similar. ROS  - per HPI, otherwise negative      BP (!) 171/81 (BP 1 Location: Left upper arm, BP Patient Position: Sitting)   Pulse 93   Temp 98 °F (36.7 °C) (Temporal)   Ht 5' 1\" (1.549 m)   Wt 174 lb 2 oz (79 kg)   SpO2 98%   BMI 32.90 kg/m²     Physical Exam  Constitutional:       General: She is not in acute distress. Appearance: Normal appearance. HENT:      Head: Normocephalic and atraumatic. Mouth/Throat:      Mouth: Mucous membranes are moist.      Pharynx: Oropharynx is clear. No oropharyngeal exudate or posterior oropharyngeal erythema. Eyes:      Extraocular Movements: Extraocular movements intact. Conjunctiva/sclera: Conjunctivae normal.      Pupils: Pupils are equal, round, and reactive to light. Neck:      Vascular: No carotid bruit. Cardiovascular:      Rate and Rhythm: Normal rate and regular rhythm. Pulses: Normal pulses. Heart sounds: Normal heart sounds. Pulmonary:      Effort: Pulmonary effort is normal.      Breath sounds: Normal breath sounds. Musculoskeletal:      Cervical back: No tenderness. Thoracic back: Tenderness present. Lumbar back: Tenderness present. Right knee: Tenderness present over the medial joint line and lateral joint line. Left knee: Tenderness present over the medial joint line and lateral joint line. Right lower leg: No edema. Left lower leg: No edema. Right foot: Normal range of motion. Bunion present. No swelling. Normal pulse. Left foot: Normal range of motion. Bunion present. No swelling. Normal pulse. Lymphadenopathy:      Cervical: No cervical adenopathy. Skin:     General: Skin is warm and dry. Neurological:      Mental Status: She is alert and oriented to person, place, and time. Gait: Gait abnormal (has cane but daughter states she only uses it while at the doctor).       Deep Tendon Reflexes: Reflexes normal.      Comments: Sensation intact on B/L monofilament exam.   Psychiatric:         Behavior: Behavior normal.

## 2021-08-26 ENCOUNTER — HOSPITAL ENCOUNTER (OUTPATIENT)
Dept: LAB | Age: 81
Discharge: HOME | End: 2021-08-26

## 2021-08-26 DIAGNOSIS — I10 ESSENTIAL HYPERTENSION: ICD-10-CM

## 2021-08-26 DIAGNOSIS — E11.22 TYPE 2 DIABETES MELLITUS WITH STAGE 3B CHRONIC KIDNEY DISEASE, WITHOUT LONG-TERM CURRENT USE OF INSULIN (HCC): ICD-10-CM

## 2021-08-26 DIAGNOSIS — M79.10 MYALGIA: ICD-10-CM

## 2021-08-26 DIAGNOSIS — N18.32 TYPE 2 DIABETES MELLITUS WITH STAGE 3B CHRONIC KIDNEY DISEASE, WITHOUT LONG-TERM CURRENT USE OF INSULIN (HCC): ICD-10-CM

## 2021-08-26 PROCEDURE — 83036 HEMOGLOBIN GLYCOSYLATED A1C: CPT

## 2021-08-26 PROCEDURE — 86141 C-REACTIVE PROTEIN HS: CPT

## 2021-08-26 PROCEDURE — 85652 RBC SED RATE AUTOMATED: CPT

## 2021-08-26 PROCEDURE — 82306 VITAMIN D 25 HYDROXY: CPT

## 2021-08-26 PROCEDURE — 80061 LIPID PANEL: CPT

## 2021-08-26 PROCEDURE — 80053 COMPREHEN METABOLIC PANEL: CPT

## 2021-08-27 LAB
25(OH)D3 SERPL-MCNC: 43.9 NG/ML (ref 30–100)
ALBUMIN SERPL-MCNC: 3.9 G/DL (ref 3.5–5)
ALBUMIN/GLOB SERPL: 1.2 {RATIO} (ref 1.1–2.2)
ALP SERPL-CCNC: 110 U/L (ref 45–117)
ALT SERPL-CCNC: 24 U/L (ref 12–78)
ANION GAP SERPL CALC-SCNC: 7 MMOL/L (ref 5–15)
AST SERPL-CCNC: 23 U/L (ref 15–37)
BILIRUB SERPL-MCNC: 1.1 MG/DL (ref 0.2–1)
BUN SERPL-MCNC: 17 MG/DL (ref 6–20)
BUN/CREAT SERPL: 14 (ref 12–20)
CALCIUM SERPL-MCNC: 9 MG/DL (ref 8.5–10.1)
CHLORIDE SERPL-SCNC: 102 MMOL/L (ref 97–108)
CHOLEST SERPL-MCNC: 163 MG/DL
CO2 SERPL-SCNC: 30 MMOL/L (ref 21–32)
CREAT SERPL-MCNC: 1.23 MG/DL (ref 0.55–1.02)
CRP SERPL HS-MCNC: 0.6 MG/L
ERYTHROCYTE [SEDIMENTATION RATE] IN BLOOD: 22 MM/HR (ref 0–30)
EST. AVERAGE GLUCOSE BLD GHB EST-MCNC: 140 MG/DL
GLOBULIN SER CALC-MCNC: 3.2 G/DL (ref 2–4)
GLUCOSE SERPL-MCNC: 105 MG/DL (ref 65–100)
HBA1C MFR BLD: 6.5 % (ref 4–5.6)
HDLC SERPL-MCNC: 51 MG/DL
HDLC SERPL: 3.2 {RATIO} (ref 0–5)
LDLC SERPL CALC-MCNC: 95.4 MG/DL (ref 0–100)
POTASSIUM SERPL-SCNC: 3.4 MMOL/L (ref 3.5–5.1)
PROT SERPL-MCNC: 7.1 G/DL (ref 6.4–8.2)
SODIUM SERPL-SCNC: 139 MMOL/L (ref 136–145)
TRIGL SERPL-MCNC: 83 MG/DL (ref ?–150)
VLDLC SERPL CALC-MCNC: 16.6 MG/DL

## 2021-08-31 DIAGNOSIS — E11.9 TYPE 2 DIABETES MELLITUS WITHOUT COMPLICATION, WITHOUT LONG-TERM CURRENT USE OF INSULIN (HCC): ICD-10-CM

## 2021-08-31 RX ORDER — GLIPIZIDE 5 MG/1
TABLET ORAL
Qty: 90 TABLET | Refills: 0 | Status: SHIPPED | OUTPATIENT
Start: 2021-08-31 | End: 2022-03-07 | Stop reason: SDUPTHER

## 2021-08-31 NOTE — PROGRESS NOTES
Please let the patient know that her lab work looked good overall.  - Her diabetes is still at goal  - She does not have Polymyalgia Rheumatica or Vitamin D deficiency  - Her cholesterol is well-controlled with her medication, she should continue it. - Her kidney function has improved since the last visit. It looks like she was already scheduled for follow-up with Dr. Sruthi Rachel 9/8.

## 2021-08-31 NOTE — PROGRESS NOTES
Tc to the pt. Her dtr Fe Rosario answered the phone she is on the [address] [place] [address]. She identified herself and the pt's  and name. The dtr was given the lab results per Dr Raoul Luis lab message. Ms Lori Rodriguez is asking when the pt should follow up with Dr Raoul Luis. We discussed the pt upcoming appt with Dr Angela Gray on 21, 2:20pm and the location. Ms Lori Rodriguez was told a message would be sent to the provider but she could also ask Dr Angela Gray when the next F/U should be. Ms Lori Rodriguez verbalized understanding.  Vicente Meza RN

## 2021-09-08 ENCOUNTER — OFFICE VISIT (OUTPATIENT)
Dept: FAMILY MEDICINE CLINIC | Age: 81
End: 2021-09-08

## 2021-09-08 VITALS
WEIGHT: 174.16 LBS | DIASTOLIC BLOOD PRESSURE: 74 MMHG | HEART RATE: 84 BPM | SYSTOLIC BLOOD PRESSURE: 141 MMHG | HEIGHT: 60 IN | BODY MASS INDEX: 34.19 KG/M2 | OXYGEN SATURATION: 98 % | TEMPERATURE: 97.5 F

## 2021-09-08 DIAGNOSIS — M25.561 CHRONIC PAIN OF RIGHT KNEE: Primary | ICD-10-CM

## 2021-09-08 DIAGNOSIS — G89.29 CHRONIC PAIN OF RIGHT KNEE: Primary | ICD-10-CM

## 2021-09-08 PROCEDURE — 99213 OFFICE O/P EST LOW 20 MIN: CPT | Performed by: FAMILY MEDICINE

## 2021-09-08 PROCEDURE — 96372 THER/PROPH/DIAG INJ SC/IM: CPT

## 2021-09-08 RX ORDER — TRIAMCINOLONE ACETONIDE 40 MG/ML
40 INJECTION, SUSPENSION INTRA-ARTICULAR; INTRAMUSCULAR ONCE
Status: COMPLETED | OUTPATIENT
Start: 2021-09-08 | End: 2021-09-08

## 2021-09-08 RX ADMIN — TRIAMCINOLONE ACETONIDE 40 MG: 40 INJECTION, SUSPENSION INTRA-ARTICULAR; INTRAMUSCULAR at 15:25

## 2021-09-08 NOTE — PROGRESS NOTES
I have printed AVS and reviewed it with patient today. Check-out Note: avs.    0 Return if symptoms worsen or fail to improve. Patient verbalized understanding.  Richarda Dubin, RN

## 2021-09-08 NOTE — PROGRESS NOTES
HISTORY OF PRESENT ILLNESS  Loulou Silvestre is a 79 y.o. female. HPI  Right knee pain. Here today for knee injection. Review of Systems   Constitutional: Negative for chills, fever and weight loss. Eyes: Negative for blurred vision, pain, discharge and redness. Respiratory: Negative for cough, hemoptysis and sputum production. Cardiovascular: Negative for chest pain, palpitations, orthopnea and claudication. Gastrointestinal: Negative for heartburn, nausea and vomiting. Genitourinary: Negative for dysuria and urgency. Musculoskeletal: Positive for joint pain. Right knee joint pain    Skin: Negative for itching and rash. Neurological: Negative for dizziness, tingling and headaches. BP (!) 150/76 (BP 1 Location: Right arm, BP Patient Position: Sitting)   Pulse 95   Temp 97.7 °F (36.5 °C) (Temporal)   Ht 5' (1.524 m)   Wt 180 lb (81.6 kg)   SpO2 99%   BMI 35.15 kg/m²   Physical Exam  Cardiovascular:      Rate and Rhythm: Normal rate and regular rhythm. Heart sounds: Normal heart sounds. Pulmonary:      Effort: Pulmonary effort is normal. No respiratory distress. Breath sounds: Normal breath sounds. No wheezing or rhonchi. Musculoskeletal:         General: Tenderness and deformity present. No swelling. Comments: There is right knee joint deformity,  Crepitus,  Pain to ROM           ASSESSMENT and PLAN  Diagnoses and all orders for this visit:     1. Chronic pain of right knee     2. Diabetes mellitus type 2, diet-controlled (HCC)  -     AMB POC GLUCOSE BLOOD, BY GLUCOSE MONITORING DEVICE           40 mg kenalog in 2 mL 1% lidocaine injected to the right knee, patient tolerated the procedure well      Note Details   Procedure:  After consent was obtained, using sterile technique the right knee joint was prepped using Chlorprep. Kenalog 40 mg was mixed with 1% lidocaine 2 ml  and injected into the joint and the needle withdrawn.   The procedure was well tolerated. The patient is asked to continue to rest the joint for a few more days before resuming regular activities. It may be more painful for the first 1-2 days. Watch for fever, or increased swelling or persistent pain in the joint. Call or return to clinic prn if such symptoms occur or there is failure to improve as anticipated.

## 2021-09-13 ENCOUNTER — TELEPHONE (OUTPATIENT)
Dept: FAMILY MEDICINE CLINIC | Age: 81
End: 2021-09-13

## 2021-09-13 NOTE — TELEPHONE ENCOUNTER
VV. OW called patient. Financial screening for [place] [id number] clinic started. OW mailed documents that need to be signed to patient. Pending SL and signed forms.

## 2021-10-13 ENCOUNTER — OFFICE VISIT (OUTPATIENT)
Dept: FAMILY MEDICINE CLINIC | Age: 81
End: 2021-10-13

## 2021-10-13 DIAGNOSIS — Z71.89 COUNSELING AND COORDINATION OF CARE: Primary | ICD-10-CM

## 2021-10-13 PROCEDURE — 99080 SPECIAL REPORTS OR FORMS: CPT | Performed by: PHYSICIAN ASSISTANT

## 2021-10-13 NOTE — PROGRESS NOTES
OW received pending documents from patient via mail. [facility] application was completed and given to Marisol Baron at [address].

## 2021-10-14 ENCOUNTER — TELEPHONE (OUTPATIENT)
Dept: FAMILY MEDICINE CLINIC | Age: 81
End: 2021-10-14

## 2021-10-14 NOTE — TELEPHONE ENCOUNTER
TOMASZ VELEZ called patient to remind her of [facility] appt. and was unable to speak with patient. Left a vm with the information. AGUSTIN mailed  A written reminder of patient's appt to her address.  Pt's appt at [address] is on 11/18/21 at 10:30am.

## 2022-02-03 ENCOUNTER — DOCUMENTATION ONLY (OUTPATIENT)
Dept: FAMILY MEDICINE CLINIC | Age: 82
End: 2022-02-03

## 2022-02-03 ENCOUNTER — TELEPHONE (OUTPATIENT)
Dept: FAMILY MEDICINE CLINIC | Age: 82
End: 2022-02-03

## 2022-02-03 NOTE — TELEPHONE ENCOUNTER
TOMASZ Frank called patient and spoke with her and her daughter, Whit Rudolph. Financial screening was started for AN renewal. An appt was made for 12/16/22 at 3:00pm. Patient replied NO to all covid19 screening questions. Pending SL. Pt was screened for SDOH. Pt opted out.

## 2022-02-16 ENCOUNTER — OFFICE VISIT (OUTPATIENT)
Dept: FAMILY MEDICINE CLINIC | Age: 82
End: 2022-02-16

## 2022-02-16 DIAGNOSIS — Z71.89 COUNSELING AND COORDINATION OF CARE: Primary | ICD-10-CM

## 2022-02-16 PROCEDURE — 99080 SPECIAL REPORTS OR FORMS: CPT | Performed by: PHYSICIAN ASSISTANT

## 2022-02-16 NOTE — PROGRESS NOTES
AGUSTIN met with patient and her daughter, Ms Jess Shine. Patient signed forms. AGUSTIN notarized  for pt. Medicare denial letter pending. As per patient, her Medicare has never been approved but she doesn't have a written notice.  AGUSTIN provided information about the next steps to complete AN application

## 2022-02-18 ENCOUNTER — TELEPHONE (OUTPATIENT)
Dept: FAMILY MEDICINE CLINIC | Age: 82
End: 2022-02-18

## 2022-02-18 NOTE — TELEPHONE ENCOUNTER
TOMASZ VELEZ received an e-mail from patient's daughter, Ms Corey Bess, saying she had called the DSS and requested patient's letter of Medicare denial and they will mail it to her. Ms Corey Bess said it would take a while because they couldn't send it to her via e-mail. Ms Corey Bess is requesting to put her mother's AN application on hold until she gets the denial letter via mail.

## 2022-03-01 DIAGNOSIS — I10 ESSENTIAL HYPERTENSION: ICD-10-CM

## 2022-03-01 RX ORDER — VALSARTAN 160 MG/1
TABLET ORAL
Qty: 180 TABLET | Refills: 1 | Status: SHIPPED
Start: 2022-03-01 | End: 2022-03-07 | Stop reason: DRUGHIGH

## 2022-03-02 ENCOUNTER — TELEPHONE (OUTPATIENT)
Dept: FAMILY MEDICINE CLINIC | Age: 82
End: 2022-03-02

## 2022-03-03 ENCOUNTER — TELEPHONE (OUTPATIENT)
Dept: FAMILY MEDICINE CLINIC | Age: 82
End: 2022-03-03

## 2022-03-03 NOTE — TELEPHONE ENCOUNTER
There was a message frm the pt's dtr Girma Winter form 03/01/22, 4:32 pm. The dtr had called the cbn line to schedule an appt for the pt. The message was sent to the registrar today as I was unavailable yesterday and did not get the message until today.  Richard Russell RN

## 2022-03-07 ENCOUNTER — OFFICE VISIT (OUTPATIENT)
Dept: FAMILY MEDICINE CLINIC | Age: 82
End: 2022-03-07

## 2022-03-07 ENCOUNTER — TELEPHONE (OUTPATIENT)
Dept: FAMILY MEDICINE CLINIC | Age: 82
End: 2022-03-07

## 2022-03-07 VITALS
HEART RATE: 99 BPM | WEIGHT: 171 LBS | BODY MASS INDEX: 33.57 KG/M2 | TEMPERATURE: 97.9 F | HEIGHT: 60 IN | OXYGEN SATURATION: 100 % | DIASTOLIC BLOOD PRESSURE: 87 MMHG | SYSTOLIC BLOOD PRESSURE: 158 MMHG

## 2022-03-07 DIAGNOSIS — M25.561 CHRONIC PAIN OF RIGHT KNEE: ICD-10-CM

## 2022-03-07 DIAGNOSIS — I10 ESSENTIAL HYPERTENSION: ICD-10-CM

## 2022-03-07 DIAGNOSIS — M25.50 POLYARTHRALGIA: ICD-10-CM

## 2022-03-07 DIAGNOSIS — E78.2 MIXED HYPERLIPIDEMIA: ICD-10-CM

## 2022-03-07 DIAGNOSIS — E11.9 TYPE 2 DIABETES MELLITUS WITHOUT COMPLICATION, WITHOUT LONG-TERM CURRENT USE OF INSULIN (HCC): Primary | ICD-10-CM

## 2022-03-07 DIAGNOSIS — G89.29 CHRONIC PAIN OF RIGHT KNEE: ICD-10-CM

## 2022-03-07 LAB — GLUCOSE POC: NORMAL MG/DL

## 2022-03-07 PROCEDURE — 82962 GLUCOSE BLOOD TEST: CPT | Performed by: FAMILY MEDICINE

## 2022-03-07 PROCEDURE — 99214 OFFICE O/P EST MOD 30 MIN: CPT | Performed by: FAMILY MEDICINE

## 2022-03-07 RX ORDER — VALSARTAN 320 MG/1
320 TABLET ORAL DAILY
Qty: 180 TABLET | Refills: 3 | Status: SHIPPED | OUTPATIENT
Start: 2022-03-07 | End: 2022-10-06 | Stop reason: SDUPTHER

## 2022-03-07 RX ORDER — GLIPIZIDE 5 MG/1
TABLET ORAL
Qty: 90 TABLET | Refills: 0 | Status: SHIPPED | OUTPATIENT
Start: 2022-03-07 | End: 2022-10-06 | Stop reason: SDUPTHER

## 2022-03-07 RX ORDER — DICLOFENAC SODIUM 10 MG/G
4 GEL TOPICAL 4 TIMES DAILY
Qty: 100 G | Refills: 5 | Status: SHIPPED | OUTPATIENT
Start: 2022-03-07 | End: 2022-03-16

## 2022-03-07 RX ORDER — ATORVASTATIN CALCIUM 80 MG/1
80 TABLET, FILM COATED ORAL DAILY
Qty: 180 TABLET | Refills: 1 | Status: SHIPPED | OUTPATIENT
Start: 2022-03-07 | End: 2022-10-06 | Stop reason: SDUPTHER

## 2022-03-07 RX ORDER — AMLODIPINE BESYLATE 5 MG/1
10 TABLET ORAL DAILY
Qty: 180 TABLET | Refills: 1 | Status: SHIPPED | OUTPATIENT
Start: 2022-03-07 | End: 2022-10-06 | Stop reason: SDUPTHER

## 2022-03-07 NOTE — PROGRESS NOTES
I have texted to pt's mobile phone number the Good RX coupons for prescriptions today that need a coupon discount. Pt understands to show this coupon from the phone to the pharmacist.   An After Visit Summary was printed and reviewed with the patient. Informed patient to give name and date of birth when picking up medication. Patient verbalized understanding.   Kaylene Guajardo

## 2022-03-07 NOTE — PROGRESS NOTES
HISTORY OF PRESENT ILLNESS  Lisa Oliva is a 79 y.o. female. HPI  Ms. Regi Pena is an 79year old woman with hypertension and type 2 diabetes presenting for medication refill and joint pain. She has been compliant with her medications; reports recent home BPs of 130's-158/80's for the past week. She is also complaining of diffuse joint pain that is worst in her left shoulder, lower back, and groin. The pain has been refractory to Tylenol and diclofenac cream.     Patient is requesting refills of all medications that will be sufficient to cover her during her international travel (May-August 2022). Review of Systems   Constitutional: Negative for fever. Eyes: Negative for blurred vision and double vision. Cardiovascular: Positive for leg swelling. Negative for chest pain and palpitations. Gastrointestinal: Negative for abdominal pain, constipation, diarrhea, nausea and vomiting. Genitourinary: Negative for dysuria, frequency and urgency. Musculoskeletal: Positive for back pain and joint pain. Skin: Negative for rash. Neurological: Negative for dizziness. Visit Vitals  BP (!) 158/87 (BP 1 Location: Right arm, BP Patient Position: Sitting)   Pulse 99   Temp 97.9 °F (36.6 °C) (Temporal)   Ht 4' 11.65\" (1.515 m)   Wt 171 lb (77.6 kg)   SpO2 100%   BMI 33.79 kg/m²       Physical Exam  Constitutional:       Appearance: Normal appearance. Eyes:      Extraocular Movements: Extraocular movements intact. Cardiovascular:      Rate and Rhythm: Normal rate and regular rhythm. Pulses: Normal pulses. Heart sounds: Normal heart sounds. Pulmonary:      Effort: Pulmonary effort is normal.      Breath sounds: Normal breath sounds. Musculoskeletal:         General: Swelling present. Cervical back: Normal range of motion. Comments: 1+ bilateral lower extremity pitting edema up to the mid shin   Skin:     General: Skin is warm and dry.    Neurological:      General: No focal deficit present. Mental Status: She is alert. ASSESSMENT and PLAN    Diagnoses and all orders for this visit:    1. Type 2 diabetes mellitus without complication, without long-term current use of insulin ([facility])  -     AMB POC GLUCOSE BLOOD, BY GLUCOSE MONITORING DEVICE  -     glipiZIDE (GLUCOTROL) 5 mg tablet; TAKE ONE TABLET BY MOUTH EVERY MORNING BEFORE BREAKFAST IF BLOOD SUGAR MEASURES 140 OR HIGHER  -     HEMOGLOBIN A1C WITH EAG; Future    2. Essential hypertension  -     amLODIPine (NORVASC) 5 mg tablet; Take 2 Tablets by mouth daily. Indications: high blood pressure    3. Chronic pain of right knee  -     diclofenac (VOLTAREN) 1 % gel; Apply 4 g to affected area four (4) times daily. Back    4. Mixed hyperlipidemia  -     atorvastatin (LIPITOR) 80 mg tablet; Take 1 Tablet by mouth daily. 1 po daily    5. Polyarthralgia  -     RHEUMATOID FACTOR, QT; Future  -     CYCLIC CITRUL PEPTIDE AB, IGG; Future  -     PARRIS, DIRECT, W/REFLEX; Future  -     SED RATE (ESR); Future    Other orders  -     valsartan (DIOVAN) 320 mg tablet; Take 1 Tablet by mouth daily. Ms. Anette De La Torre is an 79year old woman with hypertension and prediabetes presenting for medication refill and joint pain. Physical exam significant for bilateral lower extremity pitting enema. Blood pressures measured today and at home for the past week have been elevated. Will increased valsartan to 320mg. Ordered rheumatoid factor, PARRIS, ESR, and cyclic citrulinated antibody to assess for rheumatoid arthritis. Encourage continued use of Tylenol and diclofenac gel for pain management.

## 2022-03-07 NOTE — PROGRESS NOTES
Coordination of Care  1. Have you been to the ER, urgent care clinic since your last visit? Hospitalized since your last visit? No    2. Have you seen or consulted any other health care providers outside of the [address] [place] [address] since your last visit? Include any pap smears or colon screening. No    Does the patient need refills? YES    Learning Assessment Complete?  yes  Depression Screening complete in the past 12 months? yes     Results for orders placed or performed in visit on 03/07/22   AMB POC GLUCOSE BLOOD, BY GLUCOSE MONITORING DEVICE   Result Value Ref Range    Glucose POC NF-181 MG/DL

## 2022-03-07 NOTE — TELEPHONE ENCOUNTER
TOMASZ VELEZ called pt to f/up with AN and spoke with patient's daughter, Mrs Oly Sneed. Mrs Oly Sneed said she still has not received her mother's Medicaid letter that says she was not approved. AGUSTIN instructed Mrs Oly Sneed to call AN on/after 3/16/22 to confirm her referral. OW provided AN phone number and business hours.

## 2022-03-08 ENCOUNTER — HOSPITAL ENCOUNTER (OUTPATIENT)
Dept: LAB | Age: 82
Discharge: HOME | End: 2022-03-08

## 2022-03-08 DIAGNOSIS — E11.9 TYPE 2 DIABETES MELLITUS WITHOUT COMPLICATION, WITHOUT LONG-TERM CURRENT USE OF INSULIN (HCC): ICD-10-CM

## 2022-03-08 DIAGNOSIS — M25.50 POLYARTHRALGIA: ICD-10-CM

## 2022-03-08 PROCEDURE — 86038 ANTINUCLEAR ANTIBODIES: CPT

## 2022-03-08 PROCEDURE — 85652 RBC SED RATE AUTOMATED: CPT

## 2022-03-08 PROCEDURE — 86431 RHEUMATOID FACTOR QUANT: CPT

## 2022-03-08 PROCEDURE — 86200 CCP ANTIBODY: CPT

## 2022-03-08 PROCEDURE — 83036 HEMOGLOBIN GLYCOSYLATED A1C: CPT

## 2022-03-09 LAB
ERYTHROCYTE [SEDIMENTATION RATE] IN BLOOD: 37 MM/HR (ref 0–30)
EST. AVERAGE GLUCOSE BLD GHB EST-MCNC: 146 MG/DL
HBA1C MFR BLD: 6.7 % (ref 4–5.6)
RHEUMATOID FACT SERPL-ACNC: <10 IU/ML

## 2022-03-10 LAB
ANA SER QL: NEGATIVE
CCP IGA+IGG SERPL IA-ACNC: 8 UNITS (ref 0–19)

## 2022-03-10 NOTE — PROGRESS NOTES
Diabetes is well controlled hemoglobin a1c is 6.7%  Still waiting on rheumatologic labs  Patient can be informed

## 2022-03-10 NOTE — PROGRESS NOTES
TC to pt to share result note from provider. Name and  verified over phone by nurse; explained that provider is still waiting on the rheumatologic lab results. Pt expressed she was happy with the A1C result and did not have further questions.

## 2022-03-14 NOTE — PROGRESS NOTES
Tc to the pt spoke with her regarding her negative test results. The pt wanted to let the Dr know she is having pain in both thighs and her lower back and can barely stand up for 2 min, because of the pain. Routed this message to the provider.  Herbert Severin, RN

## 2022-03-15 ENCOUNTER — DOCUMENTATION ONLY (OUTPATIENT)
Dept: FAMILY MEDICINE CLINIC | Age: 82
End: 2022-03-15

## 2022-03-15 NOTE — PROGRESS NOTES
03-14-22: Patient is receiving continuing care from her [facility] specialist(s). Her financial screening for enrollment renewal was emailed to [facility]. Patient to call the [facility] appointment line, [phone], on.after 03-16-22 to discuss her enrollment status.  Pati Tong RN

## 2022-03-16 ENCOUNTER — OFFICE VISIT (OUTPATIENT)
Dept: FAMILY MEDICINE CLINIC | Age: 82
End: 2022-03-16

## 2022-03-16 VITALS
DIASTOLIC BLOOD PRESSURE: 69 MMHG | HEART RATE: 93 BPM | BODY MASS INDEX: 33.57 KG/M2 | SYSTOLIC BLOOD PRESSURE: 141 MMHG | WEIGHT: 171 LBS | OXYGEN SATURATION: 100 % | TEMPERATURE: 97.5 F | HEIGHT: 60 IN

## 2022-03-16 DIAGNOSIS — Z71.89 COUNSELING AND COORDINATION OF CARE: Primary | ICD-10-CM

## 2022-03-16 DIAGNOSIS — M25.512 CHRONIC LEFT SHOULDER PAIN: Primary | ICD-10-CM

## 2022-03-16 DIAGNOSIS — E11.9 TYPE 2 DIABETES MELLITUS WITHOUT COMPLICATION, WITHOUT LONG-TERM CURRENT USE OF INSULIN (HCC): ICD-10-CM

## 2022-03-16 DIAGNOSIS — M16.0 BILATERAL HIP JOINT ARTHRITIS: ICD-10-CM

## 2022-03-16 DIAGNOSIS — G89.29 CHRONIC LEFT SHOULDER PAIN: Primary | ICD-10-CM

## 2022-03-16 PROBLEM — H52.4 PRESBYOPIA: Status: ACTIVE | Noted: 2022-03-16

## 2022-03-16 PROBLEM — I10 HYPERTENSION: Status: ACTIVE | Noted: 2022-03-16

## 2022-03-16 LAB — GLUCOSE POC: NORMAL MG/DL

## 2022-03-16 PROCEDURE — 99080 SPECIAL REPORTS OR FORMS: CPT | Performed by: PHYSICIAN ASSISTANT

## 2022-03-16 PROCEDURE — 3044F HG A1C LEVEL LT 7.0%: CPT | Performed by: FAMILY MEDICINE

## 2022-03-16 PROCEDURE — 82962 GLUCOSE BLOOD TEST: CPT | Performed by: FAMILY MEDICINE

## 2022-03-16 PROCEDURE — 99213 OFFICE O/P EST LOW 20 MIN: CPT | Performed by: FAMILY MEDICINE

## 2022-03-16 RX ORDER — CELECOXIB 200 MG/1
200 CAPSULE ORAL 2 TIMES DAILY
Qty: 60 CAPSULE | Refills: 3 | Status: SHIPPED | OUTPATIENT
Start: 2022-03-16

## 2022-03-16 NOTE — PROGRESS NOTES
HISTORY OF PRESENT ILLNESS  Hardik Moreira is a 79 y.o. female. HPI  Patient states she is not feeling well. She feels worse of the pains she has. States she has pain in the right thigh. Also the lower back and the left shoulder. She also has knee joint pains. She states if she is resting in bed,  She feels better. Review of Systems   Constitutional: Negative for fever. Eyes: Negative for blurred vision and double vision. Cardiovascular: Positive for leg swelling. Negative for chest pain and palpitations. Gastrointestinal: Negative for abdominal pain, constipation, diarrhea, nausea and vomiting. Genitourinary: Negative for dysuria, frequency and urgency. Musculoskeletal: Positive for back pain and joint pain. Skin: Negative for rash. Neurological: Negative for dizziness. BP (!) 141/69 (BP 1 Location: Right arm, BP Patient Position: Sitting)   Pulse 93   Temp 97.5 °F (36.4 °C) (Temporal)   Ht 4' 11.65\" (1.515 m)   Wt 171 lb (77.6 kg)   SpO2 100%   BMI 33.79 kg/m²   Physical Exam  Constitutional:       Appearance: Normal appearance. Eyes:      Extraocular Movements: Extraocular movements intact. Cardiovascular:      Rate and Rhythm: Normal rate and regular rhythm. Pulses: Normal pulses. Heart sounds: Normal heart sounds. Pulmonary:      Effort: Pulmonary effort is normal.      Breath sounds: Normal breath sounds. Musculoskeletal:         General: Tenderness present. No swelling. Cervical back: Normal range of motion. Comments: Pain to ROM of the left shoulder   Skin:     General: Skin is warm and dry. Neurological:      General: No focal deficit present. Mental Status: She is alert. ASSESSMENT and PLAN  Diagnoses and all orders for this visit:    1. Chronic left shoulder pain  -     XR SHOULDER LT AP/LAT MIN 2 V; Future  -     celecoxib (CELEBREX) 200 mg capsule; Take 1 Capsule by mouth two (2) times a day.     2. Type 2 diabetes mellitus without complication, without long-term current use of insulin (HCC)  -     AMB POC GLUCOSE BLOOD, BY GLUCOSE MONITORING DEVICE    3. Bilateral hip joint arthritis  -     XR HIP RT W OR WO PELV 2-3 VWS; Future  -     XR HIP LT W OR WO PELV 2-3 VWS; Future  -     celecoxib (CELEBREX) 200 mg capsule; Take 1 Capsule by mouth two (2) times a day. 79year old female with bilateral hip pains,  And left shoulder pain, we will get x rays  In the meantime she may take celebrex as needed for joint pains  Follow up in 4 weeks.   May consider joint injections

## 2022-03-16 NOTE — PROGRESS NOTES
OW met with patient and her daughter. Patient's daughter said she's still waiting for patient's Medicaid letter and asked OW to provide AN phone number again.  OW provided AN phone number and business hours for her to call and confirm patient's AN referral.

## 2022-03-16 NOTE — PROGRESS NOTES
I have printed AVS and reviewed it with patient today. I reviewed the patient's medications with her and how the medicine should be taken and how to use the GoodRx coupon. Patient verbalized understanding. I explained to the patient how to obtain the ordered X-rays and located a [facility] to have the X-rays completed. Patient verbalized understanding. The patient correctly confirmed her complete name and date of birth prior to the information shared. No  was needed.  Charlie Choi RN

## 2022-03-16 NOTE — PROGRESS NOTES
Coordination of Care  1. Have you been to the ER, urgent care clinic since your last visit? Hospitalized since your last visit? No    2. Have you seen or consulted any other health care providers outside of the [address] [place] [address] since your last visit? Include any pap smears or colon screening. No    Does the patient need refills? NO    Learning Assessment Complete?  yes  Depression Screening complete in the past 12 months? yes  Results for orders placed or performed in visit on 03/16/22   AMB POC GLUCOSE BLOOD, BY GLUCOSE MONITORING DEVICE   Result Value Ref Range    Glucose POC fasting 133 MG/DL

## 2022-03-18 PROBLEM — E66.01 SEVERE OBESITY (HCC): Status: ACTIVE | Noted: 2020-11-12

## 2022-03-18 PROBLEM — Z87.19 HISTORY OF PANCREATITIS: Status: ACTIVE | Noted: 2018-01-26

## 2022-03-18 PROBLEM — E11.22 TYPE 2 DIABETES MELLITUS WITH CHRONIC KIDNEY DISEASE (HCC): Status: ACTIVE | Noted: 2021-08-23

## 2022-03-19 PROBLEM — E11.21 TYPE 2 DIABETES MELLITUS WITH NEPHROPATHY (HCC): Status: ACTIVE | Noted: 2017-12-29

## 2022-03-19 PROBLEM — M16.0 PRIMARY OSTEOARTHRITIS OF BOTH HIPS: Status: ACTIVE | Noted: 2017-07-21

## 2022-03-24 PROBLEM — I10 HYPERTENSION: Status: ACTIVE | Noted: 2022-03-16

## 2022-03-24 PROBLEM — H52.4 PRESBYOPIA: Status: ACTIVE | Noted: 2022-03-16

## 2022-03-24 PROBLEM — E11.9 DIABETES MELLITUS (HCC): Status: ACTIVE | Noted: 2022-03-16

## 2022-03-28 DIAGNOSIS — E03.9 HYPOTHYROIDISM: ICD-10-CM

## 2022-03-28 RX ORDER — LEVOTHYROXINE SODIUM 75 UG/1
TABLET ORAL
Qty: 60 TABLET | Refills: 0 | Status: SHIPPED | OUTPATIENT
Start: 2022-03-28 | End: 2022-04-20 | Stop reason: SDUPTHER

## 2022-04-05 ENCOUNTER — HOSPITAL ENCOUNTER (OUTPATIENT)
Dept: GENERAL RADIOLOGY | Age: 82
Discharge: HOME | End: 2022-04-05

## 2022-04-05 DIAGNOSIS — M25.512 CHRONIC LEFT SHOULDER PAIN: ICD-10-CM

## 2022-04-05 DIAGNOSIS — M16.0 BILATERAL HIP JOINT ARTHRITIS: ICD-10-CM

## 2022-04-05 DIAGNOSIS — G89.29 CHRONIC LEFT SHOULDER PAIN: ICD-10-CM

## 2022-04-05 PROCEDURE — 73521 X-RAY EXAM HIPS BI 2 VIEWS: CPT

## 2022-04-05 PROCEDURE — 73030 X-RAY EXAM OF SHOULDER: CPT

## 2022-04-11 NOTE — PROGRESS NOTES
Tc to the pt's dtr Ramya Vidal. She is on the PHI. The dtr verified her name and the pt's name and . The dtr was given the shoulder xray results of the pt. And the appt that is already scheduled for 22 was reviewed with the Dtr. Ms Melissa Nicole stated she can not bring the pt to this appt she has a work meeting. Ms Melissa Nicole requested the pt's appt be changed. The appt was canceled and new appt scheduled for 22, at 1pm at the [address].  Alondra Esquivel RN

## 2022-04-20 ENCOUNTER — OFFICE VISIT (OUTPATIENT)
Dept: FAMILY MEDICINE CLINIC | Age: 82
End: 2022-04-20

## 2022-04-20 VITALS
DIASTOLIC BLOOD PRESSURE: 66 MMHG | OXYGEN SATURATION: 100 % | WEIGHT: 170 LBS | HEIGHT: 60 IN | RESPIRATION RATE: 16 BRPM | BODY MASS INDEX: 33.38 KG/M2 | TEMPERATURE: 97.5 F | HEART RATE: 98 BPM | SYSTOLIC BLOOD PRESSURE: 151 MMHG

## 2022-04-20 DIAGNOSIS — E11.9 TYPE 2 DIABETES MELLITUS WITHOUT COMPLICATION, WITHOUT LONG-TERM CURRENT USE OF INSULIN (HCC): Primary | ICD-10-CM

## 2022-04-20 DIAGNOSIS — M16.0 BILATERAL HIP JOINT ARTHRITIS: ICD-10-CM

## 2022-04-20 DIAGNOSIS — G89.29 CHRONIC LEFT SHOULDER PAIN: ICD-10-CM

## 2022-04-20 DIAGNOSIS — M25.512 CHRONIC LEFT SHOULDER PAIN: ICD-10-CM

## 2022-04-20 DIAGNOSIS — E03.9 HYPOTHYROIDISM: ICD-10-CM

## 2022-04-20 LAB — GLUCOSE POC: NORMAL MG/DL

## 2022-04-20 PROCEDURE — 96372 THER/PROPH/DIAG INJ SC/IM: CPT

## 2022-04-20 PROCEDURE — 99213 OFFICE O/P EST LOW 20 MIN: CPT | Performed by: FAMILY MEDICINE

## 2022-04-20 PROCEDURE — 3044F HG A1C LEVEL LT 7.0%: CPT | Performed by: FAMILY MEDICINE

## 2022-04-20 PROCEDURE — 82962 GLUCOSE BLOOD TEST: CPT | Performed by: FAMILY MEDICINE

## 2022-04-20 RX ORDER — LEVOTHYROXINE SODIUM 75 UG/1
75 TABLET ORAL
Qty: 90 TABLET | Refills: 3 | Status: SHIPPED | OUTPATIENT
Start: 2022-04-20 | End: 2022-10-06 | Stop reason: SDUPTHER

## 2022-04-20 RX ORDER — TRIAMCINOLONE ACETONIDE 40 MG/ML
80 INJECTION, SUSPENSION INTRA-ARTICULAR; INTRAMUSCULAR ONCE
Status: COMPLETED | OUTPATIENT
Start: 2022-04-20 | End: 2022-04-20

## 2022-04-20 RX ORDER — MEFLOQUINE HYDROCHLORIDE 250 MG/1
250 TABLET ORAL
Qty: 10 TABLET | Refills: 0 | Status: SHIPPED | OUTPATIENT
Start: 2022-04-20 | End: 2022-10-20

## 2022-04-20 RX ORDER — LEVOTHYROXINE SODIUM 75 UG/1
75 TABLET ORAL
Qty: 60 TABLET | Refills: 3 | Status: SHIPPED | OUTPATIENT
Start: 2022-04-20 | End: 2022-04-20 | Stop reason: SDUPTHER

## 2022-04-20 RX ADMIN — TRIAMCINOLONE ACETONIDE 80 MG: 40 INJECTION, SUSPENSION INTRA-ARTICULAR; INTRAMUSCULAR at 14:12

## 2022-04-20 NOTE — PROGRESS NOTES
HISTORY OF PRESENT ILLNESS  Ladonna Torrez is a 79 y.o. female. HPI  79year old female with left shoulder pain, and right hip pain. X rays reviewed and confirmed arthritis  Review of Systems   Constitutional: Negative for fever. Eyes: Negative for blurred vision and double vision. Cardiovascular: Positive for leg swelling. Negative for chest pain and palpitations. Gastrointestinal: Negative for abdominal pain, constipation, diarrhea, nausea and vomiting. Genitourinary: Negative for dysuria, frequency and urgency. Musculoskeletal: Positive for back pain and joint pain. Skin: Negative for rash. Neurological: Negative for dizziness. BP (!) 151/66 (BP 1 Location: Right upper arm, BP Patient Position: Sitting, BP Cuff Size: Adult)   Pulse 98   Temp 97.5 °F (36.4 °C) (Temporal)   Resp 16   Ht 4' 11.65\" (1.515 m)   Wt 170 lb (77.1 kg)   SpO2 100%   BMI 33.60 kg/m²   Physical Exam  Constitutional:       Appearance: Normal appearance. Eyes:      Extraocular Movements: Extraocular movements intact. Cardiovascular:      Rate and Rhythm: Normal rate and regular rhythm. Pulses: Normal pulses. Heart sounds: Normal heart sounds. Pulmonary:      Effort: Pulmonary effort is normal.      Breath sounds: Normal breath sounds. Musculoskeletal:         General: Tenderness present. No swelling. Cervical back: Normal range of motion. Comments: Pain to ROM of the left shoulder   Skin:     General: Skin is warm and dry. Neurological:      General: No focal deficit present. Mental Status: She is alert. ASSESSMENT and PLAN  Diagnoses and all orders for this visit:    1. Type 2 diabetes mellitus without complication, without long-term current use of insulin (HCC)  -     AMB POC GLUCOSE BLOOD, BY GLUCOSE MONITORING DEVICE    2. Hypothyroidism  -     levothyroxine (SYNTHROID) 75 mcg tablet; Take 1 Tablet by mouth Daily (before breakfast).     3. Bilateral hip joint arthritis  -     triamcinolone acetonide (KENALOG-40) 40 mg/mL injection 80 mg    4. Chronic left shoulder pain  -     triamcinolone acetonide (KENALOG-40) 40 mg/mL injection 80 mg    Other orders  -     mefloquine (LARIAM) 250 mg tablet; Take 1 Tablet by mouth every seven (7) days. Take with food and at least 8 ounces of water      Procedure: 40 mg kenalog in 2 mL 1% lidocaine injected to left shoulder and right hip, patient tolerated the procedure well, no bleeding.

## 2022-04-20 NOTE — PROGRESS NOTES
Kamryn Ribeiro seen at d/c, full name and  verified, given After visit Summary and reviewed today's visit with patient along with instructions on when it is recommended to come back. I reviewed the medication list with the patient to ensure she knows how to and when to take her medications. Side effects, mechanisms of action and medication compliance were reiterated to ensure proper understanding. Good Rx coupons given to patient as well as instructions on how to use at the pharmacy. I have reviewed the provider's instructions with the patient, answering all questions to her satisfaction. Patient verbalized understanding.   Michael Middleton RN

## 2022-04-20 NOTE — PROGRESS NOTES
Bishop Niño is a 79 y.o. female  Chief Complaint   Patient presents with    Follow-up    Shoulder Pain     Patient c/o with R leg pain    Medication Refill     Blood pressure (!) 151/66, pulse 98, temperature 97.5 °F (36.4 °C), temperature source Temporal, resp. rate 16, height 4' 11.65\" (1.515 m), weight 170 lb (77.1 kg), SpO2 100 %. Results for orders placed or performed in visit on 04/20/22   AMB POC GLUCOSE BLOOD, BY GLUCOSE MONITORING DEVICE   Result Value Ref Range    Glucose -NF MG/DL     1. Have you been to the ER, urgent care clinic since your last visit? Hospitalized since your last visit? No    2. Have you seen or consulted any other health care providers outside of the [address] [place] [address] since your last visit? Include any pap smears or colon screening.  No

## 2022-09-28 ENCOUNTER — VIRTUAL VISIT (OUTPATIENT)
Dept: FAMILY MEDICINE CLINIC | Age: 82
End: 2022-09-28

## 2022-09-28 ENCOUNTER — TELEPHONE (OUTPATIENT)
Dept: FAMILY MEDICINE CLINIC | Age: 82
End: 2022-09-28
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
## 2022-09-28 DIAGNOSIS — M16.0 PRIMARY OSTEOARTHRITIS OF BOTH HIPS: ICD-10-CM

## 2022-09-28 PROBLEM — R60.9 EDEMA: Status: ACTIVE | Noted: 2021-04-29

## 2022-09-28 PROBLEM — N18.30 STAGE 3 CHRONIC KIDNEY DISEASE (HCC): Status: ACTIVE | Noted: 2020-02-19

## 2022-09-28 PROBLEM — E11.9 TYPE 2 DIABETES MELLITUS WITHOUT COMPLICATION (HCC): Status: ACTIVE | Noted: 2020-02-19

## 2022-09-28 PROCEDURE — 1123F ACP DISCUSS/DSCN MKR DOCD: CPT | Performed by: FAMILY MEDICINE

## 2022-09-28 PROCEDURE — 99213 OFFICE O/P EST LOW 20 MIN: CPT | Performed by: FAMILY MEDICINE

## 2022-09-28 RX ORDER — GUAIFENESIN 1200 MG
TABLET, EXTENDED RELEASE 12 HR ORAL
COMMUNITY

## 2022-09-28 RX ORDER — CHLORTHALIDONE 25 MG/1
25 TABLET ORAL DAILY
COMMUNITY
End: 2022-10-06 | Stop reason: SDUPTHER

## 2022-09-28 RX ORDER — DICLOFENAC SODIUM 10 MG/G
GEL TOPICAL 4 TIMES DAILY
COMMUNITY

## 2022-09-28 RX ORDER — CYCLOBENZAPRINE HCL 10 MG
5 TABLET ORAL
Qty: 10 TABLET | Refills: 0 | Status: SHIPPED | OUTPATIENT
Start: 2022-09-28
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
## 2022-09-28 NOTE — TELEPHONE ENCOUNTER
The front office CAV psr sent a message to the cbn that the pt's dtr was called and the call went straight into . She left a message.  Wang Lau RN

## 2022-09-28 NOTE — PROGRESS NOTES
1. Have you been to the ER, urgent care clinic since your last visit? Hospitalized since your last visit? Yes [facility] in [address] [place] [address] at [facility] 09/20/2022    2. Have you seen or consulted any other health care providers outside of the [address] [place] [address] since your last visit? Include any pap smears or colon screening.  Yes See above

## 2022-09-28 NOTE — TELEPHONE ENCOUNTER
A message was received on the cbn phone from the pt's dtr Paige Grier, requesting an appt for the pt. She stated the pt has already been to the ED, and she is still not feeling well. There are no ED visits to [address] [place] [address] noted in the chart. The message was sent to the [address] [place] [address] office to contact the pt's dtr for an appt. The message was left on the cbn phone at 7:27 am.this morning. 09/28/22.  Henrique Lacy RN

## 2022-09-28 NOTE — TELEPHONE ENCOUNTER
Tc message from the pt's dtr to the cbn phone. She is returning the front office call. The call was routed to the front office. Lyndsey Kellogg RN

## 2022-09-28 NOTE — PROGRESS NOTES
Paxton Bradshaw was spoken to at the time of virtual discharge. Full name and  verified. The After Visit Summary was reviewed along with today's consult to ensure all questions were answered. Instructions were reviewed as well as when it is recommended to come back to our clinic for another appointment. I reviewed the medication list with the patient to ensure she knows how to and when to take her medications. Side effects, mechanisms of action and medication compliance were reiterated to ensure proper understanding. We are in need of medical records from [facility]. Patient to come in person to sign [employer] form for us to retrieve imaging completed there during her ER visit. An in-basket msg was sent to our PSR to set her up with a nurse visit appt. Patient was given a [employer] discount card for her medications that were sent to [facility]. Patient was advised to show discount card before paying at the pharmacy register at [facility]. I have reviewed the provider's instructions with the patient, answering all questions to her satisfaction. Patient verbalized understanding.   Doris Resendiz RN

## 2022-10-06 ENCOUNTER — TELEPHONE (OUTPATIENT)
Dept: FAMILY MEDICINE CLINIC | Age: 82
End: 2022-10-06

## 2022-10-06 ENCOUNTER — OFFICE VISIT (OUTPATIENT)
Dept: FAMILY MEDICINE CLINIC | Age: 82
End: 2022-10-06

## 2022-10-06 VITALS
HEART RATE: 100 BPM | HEIGHT: 60 IN | OXYGEN SATURATION: 99 % | SYSTOLIC BLOOD PRESSURE: 191 MMHG | WEIGHT: 160 LBS | DIASTOLIC BLOOD PRESSURE: 96 MMHG | BODY MASS INDEX: 31.41 KG/M2 | TEMPERATURE: 97.5 F

## 2022-10-06 DIAGNOSIS — I10 ESSENTIAL HYPERTENSION: ICD-10-CM

## 2022-10-06 DIAGNOSIS — E78.2 MIXED HYPERLIPIDEMIA: ICD-10-CM

## 2022-10-06 DIAGNOSIS — E11.9 TYPE 2 DIABETES MELLITUS WITHOUT COMPLICATION, WITHOUT LONG-TERM CURRENT USE OF INSULIN (HCC): Primary | ICD-10-CM

## 2022-10-06 DIAGNOSIS — Z23 ENCOUNTER FOR IMMUNIZATION: ICD-10-CM

## 2022-10-06 DIAGNOSIS — J45.20 MILD INTERMITTENT ASTHMA WITHOUT COMPLICATION: ICD-10-CM

## 2022-10-06 DIAGNOSIS — E03.9 HYPOTHYROIDISM: ICD-10-CM

## 2022-10-06 DIAGNOSIS — I10 HYPERTENSION, UNSPECIFIED TYPE: ICD-10-CM

## 2022-10-06 LAB — GLUCOSE POC: NORMAL MG/DL

## 2022-10-06 PROCEDURE — 90686 IIV4 VACC NO PRSV 0.5 ML IM: CPT

## 2022-10-06 PROCEDURE — 82962 GLUCOSE BLOOD TEST: CPT | Performed by: FAMILY MEDICINE

## 2022-10-06 PROCEDURE — 99213 OFFICE O/P EST LOW 20 MIN: CPT | Performed by: FAMILY MEDICINE

## 2022-10-06 PROCEDURE — 1123F ACP DISCUSS/DSCN MKR DOCD: CPT | Performed by: FAMILY MEDICINE

## 2022-10-06 PROCEDURE — 90471 IMMUNIZATION ADMIN: CPT

## 2022-10-06 PROCEDURE — 3044F HG A1C LEVEL LT 7.0%: CPT | Performed by: FAMILY MEDICINE

## 2022-10-06 RX ORDER — GLIPIZIDE 5 MG/1
TABLET ORAL
Qty: 90 TABLET | Refills: 0 | Status: SHIPPED | OUTPATIENT
Start: 2022-10-06

## 2022-10-06 RX ORDER — ATORVASTATIN CALCIUM 80 MG/1
80 TABLET, FILM COATED ORAL DAILY
Qty: 180 TABLET | Refills: 1 | Status: SHIPPED | OUTPATIENT
Start: 2022-10-06

## 2022-10-06 RX ORDER — AMLODIPINE BESYLATE 5 MG/1
10 TABLET ORAL DAILY
Qty: 180 TABLET | Refills: 1 | Status: SHIPPED | OUTPATIENT
Start: 2022-10-06

## 2022-10-06 RX ORDER — METHYLPREDNISOLONE 4 MG/1
TABLET ORAL
Qty: 1 DOSE PACK | Refills: 0 | Status: SHIPPED | OUTPATIENT
Start: 2022-10-06

## 2022-10-06 RX ORDER — LEVOTHYROXINE SODIUM 75 UG/1
75 TABLET ORAL
Qty: 90 TABLET | Refills: 3 | Status: SHIPPED | OUTPATIENT
Start: 2022-10-06

## 2022-10-06 RX ORDER — VALSARTAN 320 MG/1
320 TABLET ORAL DAILY
Qty: 180 TABLET | Refills: 3 | Status: SHIPPED | OUTPATIENT
Start: 2022-10-06

## 2022-10-06 RX ORDER — ALBUTEROL SULFATE 90 UG/1
1 AEROSOL, METERED RESPIRATORY (INHALATION)
Qty: 1 EACH | Refills: 5 | Status: SHIPPED | OUTPATIENT
Start: 2022-10-06

## 2022-10-06 RX ORDER — CHLORTHALIDONE 25 MG/1
25 TABLET ORAL DAILY
Qty: 90 TABLET | Refills: 3 | Status: SHIPPED | OUTPATIENT
Start: 2022-10-06
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
## 2022-10-06 NOTE — PROGRESS NOTES
Coordination of Care  1. Have you been to the ER, urgent care clinic since your last visit? Hospitalized since your last visit? No    2. Have you seen or consulted any other health care providers outside of the [address] [place] [address] since your last visit? Include any pap smears or colon screening. No    Does the patient need refills? yes    Learning Assessment Complete?  yes  Depression Screening complete in the past 12 months? yes  Results for orders placed or performed in visit on 10/06/22   AMB POC GLUCOSE BLOOD, BY GLUCOSE MONITORING DEVICE   Result Value Ref Range    Glucose POC 97 fasting MG/DL

## 2022-10-06 NOTE — TELEPHONE ENCOUNTER
OW called patient after receiving a vm fronm daughter, Ms. Tabitha Baker, and was unable to speak with patient. Left a vm.

## 2022-10-06 NOTE — PROGRESS NOTES
Verified name and . An AVS was printed and given to the patient. Reviewed all discharge instructions, including: new medications, continuing medications, possible side effects, VISORY health discount program, and f/up appt. Allowed time for questions and patient verbalized understanding to all given instructions. Influenza vaccine requested by patient. VIIS historical immunizations reviewed and consent form obtained. VIS given to patient and possible side effects explained, including those which would warrant emergent evaluation. Patient denied fever, allergies, and previous immunization reactions. Immunization administered by this RN per protocol and recorded in [address]. Pt ambulated out of clinic in stable condition and had no adverse reaction at time of discharge.

## 2022-10-11 NOTE — TELEPHONE ENCOUNTER
The pt dtr has called and left a message on the cbn phone requesting assistance with scheduling the pt's ortho appt. The chart was reviewed. The Ortho appt is an AN referral. The pt's dtr was contacted. She confirmed her name and the pt's name and . She stated she is aware that the Ortho referral is a AN referral. Ms Luis Antunez stated th ept has AN approved already and they have it for 1 yr. The AN was renewed in January this yr. The pt's dtr was informed that the the pt or she would be the ones calling AN for the appt. The AN coordinator or OW would contact them and let them know when to call AN for the appt. The message was sent to both AN CAV nurse and the OW. The pt's dtr verbalized understanding.  Lita Connor RN

## 2022-10-15 ENCOUNTER — TELEPHONE (OUTPATIENT)
Dept: FAMILY MEDICINE CLINIC | Age: 82
End: 2022-10-15

## 2022-10-15 NOTE — TELEPHONE ENCOUNTER
T/c to patient, per Michaela CARTER Patient's daughter called in regards an appointment to specialist. After calling Betina REYES To clarify information, AGUSTIN has called patient's daughter Mrs. Tabitha Baker and have explained that referral has not yes been sent to AN. Betina has been trying to contact her mother to clarify some questions she has before she sends the referral to AN. Patient has verbalized understanding and asked that Betina can call her instead of her mother so that she can answer the questions. A massage has been sent to Betina letting her know Mrs. Iain Smiley will be expecting her call.

## 2022-10-20 ENCOUNTER — OFFICE VISIT (OUTPATIENT)
Dept: ORTHOPEDIC SURGERY | Age: 82
End: 2022-10-20

## 2022-10-20 VITALS — HEIGHT: 59 IN | BODY MASS INDEX: 32.25 KG/M2 | WEIGHT: 160 LBS

## 2022-10-20 DIAGNOSIS — M17.11 OSTEOARTHRITIS OF RIGHT KNEE, UNSPECIFIED OSTEOARTHRITIS TYPE: ICD-10-CM

## 2022-10-20 DIAGNOSIS — M25.552 LEFT HIP PAIN: Primary | ICD-10-CM

## 2022-10-20 DIAGNOSIS — G89.29 CHRONIC PAIN OF LEFT KNEE: ICD-10-CM

## 2022-10-20 DIAGNOSIS — M25.562 CHRONIC PAIN OF LEFT KNEE: ICD-10-CM

## 2022-10-20 PROCEDURE — 20610 DRAIN/INJ JOINT/BURSA W/O US: CPT | Performed by: PHYSICIAN ASSISTANT

## 2022-10-20 PROCEDURE — 99204 OFFICE O/P NEW MOD 45 MIN: CPT | Performed by: PHYSICIAN ASSISTANT

## 2022-10-20 PROCEDURE — 1123F ACP DISCUSS/DSCN MKR DOCD: CPT | Performed by: PHYSICIAN ASSISTANT

## 2022-10-20 RX ORDER — TRIAMCINOLONE ACETONIDE 40 MG/ML
40 INJECTION, SUSPENSION INTRA-ARTICULAR; INTRAMUSCULAR ONCE
Status: DISCONTINUED | OUTPATIENT
Start: 2022-10-20 | End: 2022-10-20

## 2022-10-20 RX ORDER — TRIAMCINOLONE ACETONIDE 40 MG/ML
40 INJECTION, SUSPENSION INTRA-ARTICULAR; INTRAMUSCULAR ONCE
Status: COMPLETED | OUTPATIENT
Start: 2022-10-20 | End: 2022-10-20

## 2022-10-20 RX ADMIN — TRIAMCINOLONE ACETONIDE 40 MG: 40 INJECTION, SUSPENSION INTRA-ARTICULAR; INTRAMUSCULAR at 15:24

## 2022-10-20 NOTE — PROGRESS NOTES
Cheryl Howard (: 1940) is a 79 y.o. female patient, here for evaluation of the following chief complaint(s):  Hip Pain (Bilateral hip pain/) and Knee Pain (Bilateral knee pain/)       ASSESSMENT/PLAN:  Below is the assessment and plan developed based on review of pertinent history, physical exam, labs, studies, and medications. 58-year-old female comes in today for bilateral hip pain and bilateral knee pain. She has been struggling with this for years and continues to worsen. Left hip is worse than right. Pain is located in the anterior groin. She is in a wheelchair today secondary to pain with ambulation secondary to pain. X-rays today show severe osteoarthritic changes with femoral head collapse. Discussed with patient treatment options. She has tried medicine physical therapy and a intra-articular steroid injection earlier this year with no relief in symptoms. Ultimate fix would be a left total hip replacement. Risks and benefits of joint arthroplasty discussed at length including but not limited to bleeding, need for blood transfusion, infection, damage to surrounding structures, intraoperative fracture, blood clots, pulmonary embolism, death. The patient understands the risks of surgery. All questions answered. We elected to move forward. She is [facility] care case. We will work on getting all these elements organized and we will follow-up with patient for a surgery date. In the meantime patient needs to see primary care to get cleared for surgery. She also complains of bilateral knee pain. Right worse than left. X-rays today reveal severe tricompartmental knee osteoarthritic pain. Discussed treatment options with patient. We will try a steroid injection. Cecilia right knee steroid. Continue with ice elevation and activity modification as needed. 1. Left hip pain  -     XR HIP LT W OR WO PELV 2-3 VWS; Future  2.  Chronic pain of left knee  -     XR KNEE LT MIN 4 V; Future  -     DRAIN/INJECT LARGE JOINT/BURSA      Encounter Diagnoses   Name Primary? Left hip pain Yes    Chronic pain of left knee         No follow-ups on file. SUBJECTIVE/OBJECTIVE:  Yusra Izaguirre (: 1940) is a 79 y.o. female who presents today for the following:  Chief Complaint   Patient presents with    Hip Pain     Bilateral hip pain      Knee Pain     Bilateral knee pain         13-year-old female comes in today for bilateral hip pain and bilateral knee pain. She has been struggling with this for years and continues to worsen. Left hip is worse than right. Pain is located in the anterior groin. She is in a wheelchair today secondary to pain with ambulation secondary to pain. IMAGING:  XR Results (most recent):  Results from Appointment encounter on 10/20/22    XR KNEE LT MIN 4 V    Narrative  4 views left knee x-rays ordered and personally reviewed. Left knee overall appears well-preserved. She does have severe arthritic changes noted to the right knee. Worse in the lateral patellofemoral compartment. Allergies   Allergen Reactions    Aspirin Other (comments)     Pt states she is asthmatic and \"knows not to take ASA\"    Aspirin Unknown (comments)     Unsure of reaction    Codeine Itching     Also nausea, diarrhea    Iodine Hives    Shellfish Containing Products Other (comments)     Asthma attack    Shellfish Derived Hives       Current Outpatient Medications   Medication Sig    valsartan (DIOVAN) 320 mg tablet Take 1 Tablet by mouth daily. levothyroxine (SYNTHROID) 75 mcg tablet Take 1 Tablet by mouth Daily (before breakfast). glipiZIDE (GLUCOTROL) 5 mg tablet TAKE ONE TABLET BY MOUTH EVERY MORNING BEFORE BREAKFAST IF BLOOD SUGAR MEASURES 140 OR HIGHER    chlorthalidone (HYGROTON) 25 mg tablet Take 1 Tablet by mouth daily. atorvastatin (LIPITOR) 80 mg tablet Take 1 Tablet by mouth daily.  1 po daily    amLODIPine (NORVASC) 5 mg tablet Take 2 Tablets by mouth daily. Indications: high blood pressure    albuterol (PROVENTIL HFA, VENTOLIN HFA, PROAIR HFA) 90 mcg/actuation inhaler Take 1 Puff by inhalation every four (4) hours as needed for Wheezing. methylPREDNISolone (MEDROL DOSEPACK) 4 mg tablet 6 tabs PO day 1, 5 tabs PO day 2, 4 tabs PO day 3, 3 tabs PO day 4, 2 tabs PO day 5, 1 tab PO day 6    acetaminophen 325 mg cap     diclofenac (VOLTAREN) 1 % gel Apply  to affected area four (4) times daily. cyclobenzaprine (FLEXERIL) 10 mg tablet Take 0.5 Tablets by mouth nightly. celecoxib (CELEBREX) 200 mg capsule Take 1 Capsule by mouth two (2) times a day. loratadine (CLARITIN) 10 mg tablet Take 1 Tab by mouth daily. For phlegm in throat    MULTIVITAMIN W-MINERALS/LUTEIN (CENTRUM SILVER PO) Take  by mouth. Takes one po daily      Current Facility-Administered Medications   Medication    triamcinolone acetonide (KENALOG-40) 40 mg/mL injection 40 mg       Past Medical History:   Diagnosis Date    Asthma     CAD (coronary artery disease)     high cholestrol    Diabetes ([facility]     History of pancreatitis 2018    Hypertension     Mixed hyperlipidemia 10/28/2011    Thyroid disease     s/p partial thyroidectomy        Past Surgical History:   Procedure Laterality Date    HX GYN       x 2    HX OTHER SURGICAL      thyroidectomy - partial    TN EGD INTRMURAL NEEDLE ASPIR/BIOP ALTERED ANATOMY  10/31/2011            Family History   Problem Relation Age of Onset    Hypertension Father         Social History     Tobacco Use    Smoking status: Never    Smokeless tobacco: Never   Substance Use Topics    Alcohol use: Yes     Comment: 1 glass of wine on Sundays        All systems reviewed x 12 and were negative with the exception of None      No flowsheet data found. Vitals:  Ht 4' 11\" (1.499 m)   Wt 160 lb (72.6 kg)   BMI 32.32 kg/m²    Body mass index is 32.32 kg/m². Physical Exam    General: NAD, well developed, well nourished.     Cardiac: Extremities well perfused. Respiratory: Nonlabored breathing. RLE: Significant antalgic gait. Wheelchair-bound. Positive [place]inch[place]. Significant pain with flexion abduction and internal rotation. LLE:  Significant antalgic gait. Wheelchair-bound. Positive [place]. Significant pain with flexion abduction and internal rotation. Skin: Warm well perfused. Vascular: Palpable pedal pulses bilaterally. Equal. Capillary refill less than 2 seconds. Noah Bello M.D. was available for immediate consultation as the supervising physician. An electronic signature was used to authenticate this note.   -- Ana Maria Lazaro PA-C

## 2022-10-31 ENCOUNTER — TELEPHONE (OUTPATIENT)
Dept: FAMILY MEDICINE CLINIC | Age: 82
End: 2022-10-31

## 2022-10-31 DIAGNOSIS — I10 HYPERTENSION, UNSPECIFIED TYPE: ICD-10-CM

## 2022-10-31 DIAGNOSIS — E11.9 TYPE 2 DIABETES MELLITUS WITHOUT COMPLICATION, WITHOUT LONG-TERM CURRENT USE OF INSULIN (HCC): Primary | ICD-10-CM

## 2022-11-16 ENCOUNTER — HOSPITAL ENCOUNTER (OUTPATIENT)
Dept: LAB | Age: 82
Discharge: HOME | End: 2022-11-16

## 2022-11-16 ENCOUNTER — OFFICE VISIT (OUTPATIENT)
Dept: FAMILY MEDICINE CLINIC | Age: 82
End: 2022-11-16

## 2022-11-16 VITALS
OXYGEN SATURATION: 98 % | WEIGHT: 159.6 LBS | TEMPERATURE: 98 F | SYSTOLIC BLOOD PRESSURE: 151 MMHG | HEART RATE: 92 BPM | BODY MASS INDEX: 32.24 KG/M2 | DIASTOLIC BLOOD PRESSURE: 79 MMHG

## 2022-11-16 DIAGNOSIS — E11.22 TYPE 2 DIABETES MELLITUS WITH STAGE 3B CHRONIC KIDNEY DISEASE, WITHOUT LONG-TERM CURRENT USE OF INSULIN (HCC): ICD-10-CM

## 2022-11-16 DIAGNOSIS — Z71.89 COUNSELING AND COORDINATION OF CARE: Primary | ICD-10-CM

## 2022-11-16 DIAGNOSIS — I10 HYPERTENSION, UNSPECIFIED TYPE: ICD-10-CM

## 2022-11-16 DIAGNOSIS — M16.12 PRIMARY OSTEOARTHRITIS OF LEFT HIP: Primary | ICD-10-CM

## 2022-11-16 DIAGNOSIS — N18.32 TYPE 2 DIABETES MELLITUS WITH STAGE 3B CHRONIC KIDNEY DISEASE, WITHOUT LONG-TERM CURRENT USE OF INSULIN (HCC): ICD-10-CM

## 2022-11-16 DIAGNOSIS — E11.9 TYPE 2 DIABETES MELLITUS WITHOUT COMPLICATION, WITHOUT LONG-TERM CURRENT USE OF INSULIN (HCC): ICD-10-CM

## 2022-11-16 DIAGNOSIS — B35.3 TINEA PEDIS OF BOTH FEET: ICD-10-CM

## 2022-11-16 LAB — GLUCOSE POC: 137 MG/DL

## 2022-11-16 PROCEDURE — 82962 GLUCOSE BLOOD TEST: CPT | Performed by: FAMILY MEDICINE

## 2022-11-16 PROCEDURE — 36415 COLL VENOUS BLD VENIPUNCTURE: CPT

## 2022-11-16 PROCEDURE — 83036 HEMOGLOBIN GLYCOSYLATED A1C: CPT

## 2022-11-16 PROCEDURE — 3078F DIAST BP <80 MM HG: CPT | Performed by: FAMILY MEDICINE

## 2022-11-16 PROCEDURE — 80053 COMPREHEN METABOLIC PANEL: CPT

## 2022-11-16 PROCEDURE — 85025 COMPLETE CBC W/AUTO DIFF WBC: CPT

## 2022-11-16 PROCEDURE — 3074F SYST BP LT 130 MM HG: CPT | Performed by: FAMILY MEDICINE

## 2022-11-16 PROCEDURE — 3044F HG A1C LEVEL LT 7.0%: CPT | Performed by: FAMILY MEDICINE

## 2022-11-16 PROCEDURE — 99213 OFFICE O/P EST LOW 20 MIN: CPT | Performed by: FAMILY MEDICINE

## 2022-11-16 PROCEDURE — 1123F ACP DISCUSS/DSCN MKR DOCD: CPT | Performed by: FAMILY MEDICINE

## 2022-11-16 PROCEDURE — 99080 SPECIAL REPORTS OR FORMS: CPT | Performed by: PHYSICIAN ASSISTANT

## 2022-11-16 RX ORDER — FISH OIL/DHA/EPA 1200-144MG
CAPSULE ORAL
COMMUNITY

## 2022-11-16 RX ORDER — KETOCONAZOLE 20 MG/G
CREAM TOPICAL DAILY
Qty: 60 G | Refills: 0 | Status: SHIPPED | OUTPATIENT
Start: 2022-11-16

## 2022-11-16 RX ORDER — HYDROCHLOROTHIAZIDE 12.5 MG/1
12.5 TABLET ORAL DAILY
Qty: 90 TABLET | Refills: 3 | Status: SHIPPED | OUTPATIENT
Start: 2022-11-16

## 2022-11-16 NOTE — PROGRESS NOTES
Name and  confirmed w/ patient. An After Visit Summary was provided and all discharge instructions were reviewed with the patient including: new medications, side-effects, f/up appt, and [address] coupon. OW will meet with patient today as a walk-in. Time for questions and answers provided, patient verbalized understanding. Patient discharged from clinic in stable condition.

## 2022-11-16 NOTE — PROGRESS NOTES
HISTORY OF PRESENT ILLNESS  Terri Trotter is a 79 y.o. female. 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 The plan is to have surgery of the hip. She is compliant with her blood pressure and diabetes medications  Denies any other concerns. Needs to apply for a care card  Review of Systems   Constitutional:  Negative for fever. Eyes:  Negative for blurred vision and double vision. Cardiovascular:  Negative for chest pain, palpitations and leg swelling. Gastrointestinal:  Negative for abdominal pain, constipation, diarrhea, nausea and vomiting. Genitourinary:  Negative for dysuria, frequency and urgency. Musculoskeletal:  Positive for back pain and joint pain. Hip pain L> R   Skin:  Negative for rash. Neurological:  Negative for dizziness. BP (!) 151/79 (BP 1 Location: Right upper arm, BP Patient Position: Sitting, BP Cuff Size: Adult)   Pulse 92   Temp 98 °F (36.7 °C) (Temporal)   Wt 159 lb 9.6 oz (72.4 kg)   SpO2 98%   BMI 32.24 kg/m²   Physical Exam  Constitutional:       Appearance: Normal appearance. Eyes:      Extraocular Movements: Extraocular movements intact. Cardiovascular:      Rate and Rhythm: Normal rate and regular rhythm. Pulses: Normal pulses. Heart sounds: Normal heart sounds. Pulmonary:      Effort: Pulmonary effort is normal.      Breath sounds: Normal breath sounds. Musculoskeletal:         General: Tenderness present. No swelling. Cervical back: Normal range of motion. Comments: Pain to palpation of both hips   Skin:     General: Skin is warm. Comments: Dry scaly skin lesions of sole of feet   Neurological:      General: No focal deficit present. Mental Status: She is alert. ASSESSMENT and PLAN  Diagnoses and all orders for this visit:    1. Primary osteoarthritis of left hip  -     REFERRAL TO SOCIAL WORK    2.  Type 2 diabetes mellitus without complication, without long-term current use of insulin ([facility])  -     AMB POC GLUCOSE BLOOD, BY GLUCOSE MONITORING DEVICE  -     HEMOGLOBIN A1C WITH EAG; Future  -     METABOLIC PANEL, COMPREHENSIVE; Future  -     CBC WITH AUTOMATED DIFF; Future    3. Hypertension, unspecified type  -     hydroCHLOROthiazide (HYDRODIURIL) 12.5 mg tablet; Take 1 Tablet by mouth daily. 4. Type 2 diabetes mellitus with stage 3b chronic kidney disease, without long-term current use of insulin ([facility]    5. Tinea pedis of both feet  -     ketoconazole (NIZORAL) 2 % topical cream; Apply  to affected area daily.  Sole of feet x 3 weeks    79year old female with severe left hip ostoarthritis,  surgical management planned  Blood pressure not at target, we will add hctz  Check labs  Return in 3 weeks, bring preop paperwork with

## 2022-11-16 NOTE — PROGRESS NOTES
Coordination of Care  1. Have you been to the ER, urgent care clinic since your last visit? Hospitalized since your last visit? No    2. Have you seen or consulted any other health care providers outside of the [address] [place] [address] since your last visit? Include any pap smears or colon screening. No    Does the patient need refills? YES    Learning Assessment Complete?  yes  Depression Screening complete in the past 12 months? yes

## 2022-11-16 NOTE — PROGRESS NOTES
Patient came as a walk in with her daughter, Sylvie Hartman, asking to apply for FA (Care Card). Patient's previous Care card was fully approved until 11/30/22. OW printed approval letter for her, filled out a new CC application and notarized a SL for patient, in case she needs to renew. Patient took new filled out application and SL with her. Patient has no recent pending balance. OW explained the process. Both, patient and her daughter, verbalized understanding. OW provided Medicare resources information to patient.

## 2022-11-17 LAB
ALBUMIN SERPL-MCNC: 3.8 G/DL (ref 3.5–5)
ALBUMIN/GLOB SERPL: 1.1 {RATIO} (ref 1.1–2.2)
ALP SERPL-CCNC: 119 U/L (ref 45–117)
ALT SERPL-CCNC: 26 U/L (ref 12–78)
ANION GAP SERPL CALC-SCNC: 6 MMOL/L (ref 5–15)
AST SERPL-CCNC: 16 U/L (ref 15–37)
BASOPHILS # BLD: 0 K/UL (ref 0–0.1)
BASOPHILS NFR BLD: 0 % (ref 0–1)
BILIRUB SERPL-MCNC: 0.8 MG/DL (ref 0.2–1)
BUN SERPL-MCNC: 20 MG/DL (ref 6–20)
BUN/CREAT SERPL: 16 (ref 12–20)
CALCIUM SERPL-MCNC: 9.9 MG/DL (ref 8.5–10.1)
CHLORIDE SERPL-SCNC: 106 MMOL/L (ref 97–108)
CO2 SERPL-SCNC: 32 MMOL/L (ref 21–32)
CREAT SERPL-MCNC: 1.22 MG/DL (ref 0.55–1.02)
DIFFERENTIAL METHOD BLD: ABNORMAL
EOSINOPHIL # BLD: 0 K/UL (ref 0–0.4)
EOSINOPHIL NFR BLD: 0 % (ref 0–7)
ERYTHROCYTE [DISTWIDTH] IN BLOOD BY AUTOMATED COUNT: 16.1 % (ref 11.5–14.5)
EST. AVERAGE GLUCOSE BLD GHB EST-MCNC: 186 MG/DL
GLOBULIN SER CALC-MCNC: 3.5 G/DL (ref 2–4)
GLUCOSE SERPL-MCNC: 147 MG/DL (ref 65–100)
HBA1C MFR BLD: 8.1 % (ref 4–5.6)
HCT VFR BLD AUTO: 42.1 % (ref 35–47)
HGB BLD-MCNC: 14 G/DL (ref 11.5–16)
IMM GRANULOCYTES # BLD AUTO: 0 K/UL (ref 0–0.04)
IMM GRANULOCYTES NFR BLD AUTO: 0 % (ref 0–0.5)
LYMPHOCYTES # BLD: 2.7 K/UL (ref 0.8–3.5)
LYMPHOCYTES NFR BLD: 29 % (ref 12–49)
MCH RBC QN AUTO: 27.9 PG (ref 26–34)
MCHC RBC AUTO-ENTMCNC: 33.3 G/DL (ref 30–36.5)
MCV RBC AUTO: 83.9 FL (ref 80–99)
MONOCYTES # BLD: 0.7 K/UL (ref 0–1)
MONOCYTES NFR BLD: 8 % (ref 5–13)
NEUTS SEG # BLD: 5.7 K/UL (ref 1.8–8)
NEUTS SEG NFR BLD: 63 % (ref 32–75)
NRBC # BLD: 0 K/UL (ref 0–0.01)
NRBC BLD-RTO: 0 PER 100 WBC
PLATELET # BLD AUTO: 371 K/UL (ref 150–400)
PMV BLD AUTO: 11.4 FL (ref 8.9–12.9)
POTASSIUM SERPL-SCNC: 3.9 MMOL/L (ref 3.5–5.1)
PROT SERPL-MCNC: 7.3 G/DL (ref 6.4–8.2)
RBC # BLD AUTO: 5.02 M/UL (ref 3.8–5.2)
SODIUM SERPL-SCNC: 144 MMOL/L (ref 136–145)
WBC # BLD AUTO: 9.2 K/UL (ref 3.6–11)

## 2022-11-17 NOTE — PROGRESS NOTES
Hemoglobin a1c is 8.1%,  needs to be strict with diet and complaint with medications  Patient can be informed

## 2022-11-17 NOTE — PROGRESS NOTES
Tc to the pt she verified her name and . The pt was given her lab results message from the provider.  Darian Wasserman RN

## 2022-11-22 ENCOUNTER — TELEPHONE (OUTPATIENT)
Dept: FAMILY MEDICINE CLINIC | Age: 82
End: 2022-11-22

## 2022-11-22 NOTE — TELEPHONE ENCOUNTER
Patient's daughter called OW. OW checked on patient's chart. FA has been fully approved until 7/31/23. Patient's daughter said BS provider is not sure whether that office accepts Care Card FA or not and they're finding out to see If patient's surgery can be scheduled. OW explained that some providers accept Care Card and some others do not. Patient's daughter also said she already received FA approval letter.

## 2022-12-02 DIAGNOSIS — M16.12 ARTHRITIS OF LEFT HIP: Primary | ICD-10-CM

## 2022-12-07 ENCOUNTER — OFFICE VISIT (OUTPATIENT)
Dept: FAMILY MEDICINE CLINIC | Age: 82
End: 2022-12-07

## 2022-12-07 VITALS
WEIGHT: 153.4 LBS | RESPIRATION RATE: 20 BRPM | HEART RATE: 99 BPM | SYSTOLIC BLOOD PRESSURE: 130 MMHG | BODY MASS INDEX: 30.92 KG/M2 | TEMPERATURE: 99 F | OXYGEN SATURATION: 100 % | DIASTOLIC BLOOD PRESSURE: 60 MMHG | HEIGHT: 59 IN

## 2022-12-07 DIAGNOSIS — E11.9 TYPE 2 DIABETES MELLITUS WITHOUT COMPLICATION, WITHOUT LONG-TERM CURRENT USE OF INSULIN (HCC): ICD-10-CM

## 2022-12-07 DIAGNOSIS — M16.12 PRIMARY OSTEOARTHRITIS OF LEFT HIP: Primary | ICD-10-CM

## 2022-12-07 DIAGNOSIS — I10 HYPERTENSION, UNSPECIFIED TYPE: ICD-10-CM

## 2022-12-07 LAB — GLUCOSE POC: NORMAL MG/DL

## 2022-12-07 PROCEDURE — 3074F SYST BP LT 130 MM HG: CPT | Performed by: FAMILY MEDICINE

## 2022-12-07 PROCEDURE — 82962 GLUCOSE BLOOD TEST: CPT | Performed by: FAMILY MEDICINE

## 2022-12-07 PROCEDURE — 99213 OFFICE O/P EST LOW 20 MIN: CPT | Performed by: FAMILY MEDICINE

## 2022-12-07 PROCEDURE — 3052F HG A1C>EQUAL 8.0%<EQUAL 9.0%: CPT | Performed by: FAMILY MEDICINE

## 2022-12-07 PROCEDURE — 3078F DIAST BP <80 MM HG: CPT | Performed by: FAMILY MEDICINE

## 2022-12-07 PROCEDURE — 1123F ACP DISCUSS/DSCN MKR DOCD: CPT | Performed by: FAMILY MEDICINE

## 2022-12-07 NOTE — PROGRESS NOTES
AVS printed and reviewed with patient and daughter who was with her mother during the visit and at the time of discharge. Pre-op physical form including all lab results from 11/16/22 stamped and faxed to [facility] at [phone] as requested. Fax confirmation received and given to patient. Copy sent for scanning and original including lab results from 11/16/22 was given to patient. Patient and patient's daughter indicated understanding and appreciated the service today.

## 2022-12-07 NOTE — PROGRESS NOTES
HISTORY OF PRESENT ILLNESS  Wil Dillon is a 79 y.o. female. HPI  Patient will have left hip surgery 12/14/22, needs preop forms filled  Review of Systems   Constitutional:  Negative for fever. Eyes:  Negative for blurred vision and double vision. Cardiovascular:  Negative for chest pain, palpitations and leg swelling. Gastrointestinal:  Negative for abdominal pain, constipation, diarrhea, nausea and vomiting. Genitourinary:  Negative for dysuria, frequency and urgency. Musculoskeletal:  Positive for joint pain. Negative for back pain. Hip pain L> R   Skin:  Negative for rash. Neurological:  Negative for dizziness. /60 (BP 1 Location: Left upper arm, BP Patient Position: Sitting)   Pulse 99   Temp 99 °F (37.2 °C) (Temporal)   Resp 20   Ht 4' 11.02\" (1.499 m)   Wt 153 lb 6.4 oz (69.6 kg)   SpO2 100%   BMI 30.97 kg/m²   Physical Exam  Constitutional:       Appearance: Normal appearance. Eyes:      Extraocular Movements: Extraocular movements intact. Cardiovascular:      Rate and Rhythm: Normal rate and regular rhythm. Pulses: Normal pulses. Heart sounds: Normal heart sounds. Pulmonary:      Effort: Pulmonary effort is normal.      Breath sounds: Normal breath sounds. Musculoskeletal:         General: Tenderness present. No swelling. Cervical back: Normal range of motion. Comments: Pain to palpation of both hips   Skin:     General: Skin is warm. Neurological:      General: No focal deficit present. Mental Status: She is alert. ASSESSMENT and PLAN  Diagnoses and all orders for this visit:    1. Primary osteoarthritis of left hip    2. Type 2 diabetes mellitus without complication, without long-term current use of insulin ([facility])  -     AMB POC GLUCOSE BLOOD, BY GLUCOSE MONITORING DEVICE    3.  Hypertension, unspecified type    79year old female with severe left hip arthritis, scheduled for surgery 12/14/22, average surgical risk, may proceed with surgery as planned

## 2022-12-07 NOTE — PROGRESS NOTES
Chief Complaint   Patient presents with    Pre-op Exam     Pt having LEFT hip replacement 12/14/22, needs pre-op form filled out and faxed to provider     Visit Vitals  BP (!) 140/72 (BP 1 Location: Left upper arm, BP Patient Position: Sitting)   Pulse 99   Temp 99 °F (37.2 °C) (Temporal)   Resp 20   Ht 4' 11.02\" (1.499 m)   Wt 153 lb 6.4 oz (69.6 kg)   SpO2 100%   BMI 30.97 kg/m²     Results for orders placed or performed in visit on 12/07/22   AMB POC GLUCOSE BLOOD, BY GLUCOSE MONITORING DEVICE   Result Value Ref Range    Glucose -f MG/DL     Coordination of Care  1. Have you been to the ER, urgent care clinic since your last visit? Hospitalized since your last visit? No    2. Have you seen or consulted any other health care providers outside of the [address] [place] [address] since your last visit? Include any pap smears or colon screening. No    Does the patient need refills? NO    Learning Assessment Complete?  yes  Depression Screening complete in the past 12 months? yes

## 2022-12-12 ENCOUNTER — HOSPITAL ENCOUNTER (OUTPATIENT)
Dept: PREADMISSION TESTING | Age: 82
Discharge: HOME | End: 2022-12-12
Payer: SELF-PAY

## 2022-12-12 VITALS
RESPIRATION RATE: 18 BRPM | SYSTOLIC BLOOD PRESSURE: 147 MMHG | DIASTOLIC BLOOD PRESSURE: 76 MMHG | HEIGHT: 59 IN | BODY MASS INDEX: 30.49 KG/M2 | WEIGHT: 151.24 LBS | TEMPERATURE: 98.5 F | HEART RATE: 97 BPM

## 2022-12-12 LAB
ABO + RH BLD: NORMAL
APPEARANCE UR: CLEAR
ATRIAL RATE: 91 BPM
BACTERIA URNS QL MICRO: NEGATIVE /HPF
BILIRUB UR QL: NEGATIVE
BLOOD GROUP ANTIBODIES SERPL: NORMAL
CALCULATED P AXIS, ECG09: 37 DEGREES
CALCULATED R AXIS, ECG10: -31 DEGREES
CALCULATED T AXIS, ECG11: 53 DEGREES
COLOR UR: NORMAL
DIAGNOSIS, 93000: NORMAL
EPITH CASTS URNS QL MICRO: NORMAL /LPF
GLUCOSE UR STRIP.AUTO-MCNC: NEGATIVE MG/DL
HGB UR QL STRIP: NEGATIVE
HYALINE CASTS URNS QL MICRO: NORMAL /LPF (ref 0–5)
INR PPP: 1 (ref 0.9–1.1)
KETONES UR QL STRIP.AUTO: NEGATIVE MG/DL
LEUKOCYTE ESTERASE UR QL STRIP.AUTO: NEGATIVE
NITRITE UR QL STRIP.AUTO: NEGATIVE
P-R INTERVAL, ECG05: 162 MS
PH UR STRIP: 6.5 [PH] (ref 5–8)
PROT UR STRIP-MCNC: NEGATIVE MG/DL
PROTHROMBIN TIME: 10.8 SEC (ref 9–11.1)
Q-T INTERVAL, ECG07: 354 MS
QRS DURATION, ECG06: 74 MS
QTC CALCULATION (BEZET), ECG08: 435 MS
RBC #/AREA URNS HPF: NORMAL /HPF (ref 0–5)
SP GR UR REFRACTOMETRY: 1.01 (ref 1–1.03)
SPECIMEN EXP DATE BLD: NORMAL
UA: UC IF INDICATED,UAUC: NORMAL
UROBILINOGEN UR QL STRIP.AUTO: 0.2 EU/DL (ref 0.2–1)
VENTRICULAR RATE, ECG03: 91 BPM
WBC URNS QL MICRO: NORMAL /HPF (ref 0–4)

## 2022-12-12 PROCEDURE — 36415 COLL VENOUS BLD VENIPUNCTURE: CPT

## 2022-12-12 PROCEDURE — 81001 URINALYSIS AUTO W/SCOPE: CPT

## 2022-12-12 PROCEDURE — 85610 PROTHROMBIN TIME: CPT

## 2022-12-12 PROCEDURE — 86900 BLOOD TYPING SEROLOGIC ABO: CPT

## 2022-12-12 PROCEDURE — 93005 ELECTROCARDIOGRAM TRACING: CPT

## 2022-12-12 RX ORDER — DEXTROMETHORPHAN HYDROBROMIDE, GUAIFENESIN 5; 100 MG/5ML; MG/5ML
650 LIQUID ORAL EVERY 8 HOURS
COMMUNITY

## 2022-12-12 RX ORDER — KETOCONAZOLE 20 MG/G
CREAM TOPICAL DAILY
COMMUNITY

## 2022-12-12 NOTE — PERIOP NOTES
[address] [facility] INSTRUCTIONS  ORTHOPAEDIC    Surgery Date:   12/14/22    Your surgeon's office or [facility] staff will call you between 4 PM- 8 PM the day before surgery with your arrival time. If your surgery is on a Monday, you will receive a call the preceding Friday. Please report to [facility] Patient Access/Admitting on the 1st floor. Bring your insurance card, photo identification, and any copayment (if applicable). If you are going home the same day of your surgery, you must have a responsible adult to drive you home. You need to have a responsible adult to stay with you the first 24 hours after surgery and you should not drive a car for 24 hours following your surgery. Do NOT eat any solid foods after midnight the night before surgery including candy, mints or gum. You may drink clear liquids from midnight until 1 hour prior to arrival time. You may drink up to 12 ounces at one time every 4 hours. Do NOT drink alcohol or smoke 24 hours before surgery. STOP smoking for 14 days prior as it helps with breathing and healing after surgery. If your arrival time is 3pm or later, you may eat a light breakfast before 8am (toast, bagel-no butter, black coffee, plain tea, fruit juice-no pulp) Please note special instructions, if applicable, below for medications. If you are being admitted to the hospital,please leave personal belongings/luggage in your car until you have an assigned hospital room number. Please wear comfortable clothes. Wear your glasses instead of contacts. We ask that all money, jewelry and valuables be left at home. Wear no make up, particularly mascara, the day of surgery. All body piercings, rings, and jewelry need to be removed and left at home. Please remove any nail polish or artificial nails from your fingernails. Please wear your hair loose or down. Please no pony-tails, buns, or any metal hair accessories.  If you shower the morning of surgery, please do not apply any lotions or powders afterwards. You may wear deodorant. Do not shave any body area within 24 hours of your surgery. Please follow all instructions to avoid any potential surgical cancellation. Should your physical condition change, (i.e. fever, cold, flu, etc.) please notify your surgeon as soon as possible. It is important to be on time. If a situation occurs where you may be delayed, please call:  [phone] / [phone] on the day of surgery. The Preadmission Testing staff can be reached at [phone]. Special instructions: NONE    Current Outpatient Medications   Medication Sig    acetaminophen (Tylenol Arthritis Pain) 650 mg TbER Take 650 mg by mouth every eight (8) hours. ketoconazole (NIZORAL) 2 % topical cream Apply  to affected area daily. APPLY TO SOLES OF FEET DAILY    hydroCHLOROthiazide (HYDRODIURIL) 12.5 mg tablet Take 1 Tablet by mouth daily. valsartan (DIOVAN) 320 mg tablet Take 1 Tablet by mouth daily. levothyroxine (SYNTHROID) 75 mcg tablet Take 1 Tablet by mouth Daily (before breakfast). glipiZIDE (GLUCOTROL) 5 mg tablet TAKE ONE TABLET BY MOUTH EVERY MORNING BEFORE BREAKFAST IF BLOOD SUGAR MEASURES 140 OR HIGHER    atorvastatin (LIPITOR) 80 mg tablet Take 1 Tablet by mouth daily. 1 po daily (Patient taking differently: Take 80 mg by mouth nightly. 1 po daily)    amLODIPine (NORVASC) 5 mg tablet Take 2 Tablets by mouth daily. Indications: high blood pressure    albuterol (PROVENTIL HFA, VENTOLIN HFA, PROAIR HFA) 90 mcg/actuation inhaler Take 1 Puff by inhalation every four (4) hours as needed for Wheezing.    loratadine (CLARITIN) 10 mg tablet Take 1 Tab by mouth daily. For phlegm in throat    MULTIVITAMIN W-MINERALS/LUTEIN (CENTRUM SILVER PO) Take  by mouth. Takes one po daily      No current facility-administered medications for this encounter.        YOU MUST ONLY TAKE THESE MEDICATIONS THE MORNING OF SURGERY WITH A SIP OF WATER: CLARITIN, AMLODIPINE, LEVOTHYROXINE  MEDICATIONS TO TAKE THE MORNING OF SURGERY ONLY IF NEEDED: TYLENOL, ALBUTEROL  HOLD these prescription medications BEFORE Surgery: DO NOT TAKE GLIPIZIDE DAY OF SURGERY, DO NOT TAKE VALSARTAN DAY OF SURGERY, DO NOT TAKE HYDROCHLOROTHIAZIDE DAY OF SURGERY  Ask your surgeon/prescribing physician about when/if to STOP taking these medications: NONE  Stop any non-steroidal anti-inflammatory drugs (i.e. Ibuprofen, Naproxen, Advil, Aleve) 3 days before surgery. You may take Tylenol. STOP all vitamins and herbal supplements 1 week prior to  surgery. If you are currently taking Plavix, Coumadin, or any other blood-thinning/anticoagulant medication contact your prescribing physician for instructions. Preventing Infections Before and After - Your Surgery    IMPORTANT INSTRUCTIONS    You play an important role in your health and preparation for surgery. To reduce the germs on your skin you will need to shower with CHG soap (Chorhexidine gluconate 4%) two times before surgery. CHG soap (Hibiclens, Hex-A-Clens or store brand)  CHG soap will be provided at your Preadmission Testing (PAT) appointment. If you do not have a PAT appointment before surgery, you may arrange to  CHG soap from our office or purchase CHG soap at a pharmacy, grocery or department store. You need to purchase TWO 4 ounce bottles to use for your 2 showers. Steps to follow:  Sudie Loron your hair with your normal shampoo and your body with regular soap and rinse well to remove shampoo and soap from your skin. Wet a clean washcloth and turn off the shower. Put CHG soap on washcloth and apply to your entire body from the neck down. Do not use on your head, face or private parts(genitals). Do not use CHG soap on open sores, wounds or areas of skin irritation. Wash you body gently for 5 minutes. Do not wash your skin too hard. This soap does not create lather.  Pay special attention to your underarms and from your belly button to your feet.  Turn the shower back on and rinse well to get CHG soap off your body. Pat your skin dry with a clean, dry towel. Do not apply lotions or moisturizer. Put on clean clothes and sleep on fresh bed sheets and do not allow pets to sleep with you. Shower with CHG soap 2 times before your surgery  The evening before your surgery  The morning of your surgery      Tips to help prevent infections after your surgery:  Protect your surgical wound from germs:  Hand washing is the most important thing you and your caregivers can do to prevent infections. Keep your bandage clean and dry! Do not touch your surgical wound. Use clean, freshly washed towels and washcloths every time you shower; do not share bath linens with others. Until your surgical wound is healed, wear clothing and sleep on bed linens each day that are clean and freshly washed. Do not allow pets to sleep in your bed with you or touch your surgical wound. Do not smoke - smoking delays wound healing. This may be a good time to stop smoking. If you have diabetes, it is important for you to manage your blood sugar levels properly before your surgery as well as after your surgery. Poorly managed blood sugar levels slow down wound healing and prevent you from healing completely. Prevention of Infection  Testing for Staphylococcus aureus on your skin before surgery    Staphylococcus aureus (staph) is a common bacteria that is found on the body. It normally does not cause infection on healthy skin. Before surgery, you will be tested to see if you have staph by swabbing the inside of your nose. When you have an incision with surgery, the goal is to protect that incision from infection. Removal of the staph bacteria before surgery can decrease the risk of a surgical site infection. If your nose swab is positive for staph you will be called.  Your treatment will include 2 steps:  Prescription for Mupirocin ointment to be used in each nostril twice a day for 5 days. Showering with Chlorhexidine (CHG) liquid soap for 5 days prior to surgery. How to use Mupirocin ointment in your nose   the prescription from your pharmacy. You will receive a large tube of ointment which will be big enough for all of your treatments. You will apply this ointment to each nostril 2 times a day for 5 days. Wash your hands with  gel or soap and water for 20 seconds before using ointment. Place a pea-sized amount of ointment on a cotton Q-tip. Apply ointment just inside of each nostril with the Q-tip. Do not push Q-tip or ointment deep inside you nose. Press your nostrils together and massage for a few seconds. Wash your hands with  gel or soap and water after you are finished. Do not get ointment near your eyes. If it gets into your eyes, rinse them with cool water. If you need to use nasal spray, clean the tip of the bottle with alcohol before use and do not use both at the same time. If you are scheduled for COVID testing during the 5 days, do NOT apply morning dose until after the COVID test has been performed. How to use Chlorhexidine (CHG) 4% liquid soap  Purchase an 8 ounce bottle of CHG liquid soap (Chlorhexidine 4%, Hibiclens, Hex-A-Clens or store brand) at a pharmacy or grocery store. Wash your hair with your normal shampoo and your body with regular soap and rinse well to remove shampoo and soap from your skin. Wet a clean washcloth and turn off the shower. Put CHG soap on washcloth and apply to your entire body from the neck down. Do not use on your head, face or private parts(genitals). Do not use CHG soap on open sores, wounds or areas of skin irritation. Wash your body gently for 5 minutes. Do not wash your skin too hard. This soap does not create lather. Pay special attention to your underarms and from your belly button to your feet. Turn the shower back on and rinse well to get CHG soap off your body.   Pat your skin dry with a clean, dry towel. Do not apply lotions or moisturizer. Put on clean clothes and sleep on fresh bed sheets the night before surgery. Do not allow pets to sleep with you. Eating and Drinking Before Surgery    You may eat a regular dinner at the usual time on the day before your surgery. Do NOT eat any solid foods after midnight unless your arrival time at the hospital is 3pm or later. You may drink clear liquids only from 12 midnight until 1 hours prior to your arrival time at the hospital on the day of your surgery. Do NOT drink alcohol. Clear liquids include:  Water  Fruit juices without pulp( i.e. apple juice)  Carbonated beverages  Black coffee (no cream/milk)  Tea (no cream/milk)  Gatorade  You may drink up to 12-16 ounces at one time every 4 hours between the hours of midnight and 1 hour before your arrival time at the hospital. Example- if your arrival time at the hospital is 6am, you may drink 12-16 ounces of clear liquids no later than 5am.  If your arrival time at the hospital is 3pm or later, you may eat a light breakfast before 8am.  A light breakfast includes: Toast or bagel (no butter)  Black coffee (no cream/milk)  Tea (no cream/milk)  Fruit juices without pulp ( i.e. apple juice)  Do NOT eat meat, eggs, vegetables or fruit  If you have any questions, please contact your surgeon's office. Patient Information Regarding COVID Restrictions    Day of Procedure    Please park in the parking deck or any designated visitor parking lot. Enter the facility through the Main Entrance of the hospital.  On the day of surgery, please provide the cell phone number of the person who will be waiting for you to the Patient Access representative at the time of registration. Masks are highly recommended in the hospital, but not required.   Once your procedure and the immediate recovery period is completed, a nurse in the recovery area will contact your designated visitor to inform them of your room number or to otherwise review other pertinent information regarding your care. Social distancing practices are strongly encouraged in waiting areas and the cafeteria. The patient and daughter were contacted in person. They verbalized understanding of all instructions do not  need reinforcement.

## 2022-12-13 LAB
BACTERIA SPEC CULT: NORMAL
BACTERIA SPEC CULT: NORMAL
SERVICE CMNT-IMP: NORMAL

## 2022-12-13 NOTE — DISCHARGE INSTRUCTIONS
Discharge Instructions Hip Replacement  Dr. Indu Pugh    Patient Name  Shana Hernandez  Date of procedure  12/21/2022    Procedure  Procedure(s):  LEFT TOTAL HIP ARTHROPLASTY ANTERIOR APPROACH  Surgeon  Surgeon(s) and Role:     * Stephon Duke MD - Primary  Date of discharge: [unfilled]  PCP: @PCP@    Follow up care  Follow up visit with Dr. Indu Pugh in 4 weeks. Call [phone] Gianfranco Mcadams to make an appointment. If Home Health has been arranged for you, they will call you to arrange dates/times for visits. Call them if you do not hear from them within 24 hours after you go home. Activity at home  AVOID sudden and extreme movement of your hip (surgical leg)  Take a short walk every hour; except at night when sleeping. Do your Home Exercise Program 3 times every day. After exercising lie down and elevate your leg on pillows for 15-30 minutes to decrease swelling. Refer to your patient notebook for more information. Bathing and caring for your incision  You may take a shower with your waterproof dressing on your hip. The waterproof dressing is to stay on your hip for 7 days. On the 7th day have someone gently peel the dressing off by lifting the edge and stretching it to break the seal.  You may then leave your incision open to air unless you see drainage from your hip. Preventing blood clots  Take Eliquis 2.5 mg twice each day for one month (30 days) following surgery  Call Dr. Indu Pugh for signs of a blood clot in your leg: calf pain, tenderness, redness, swelling of lower leg   Preventing lung congestion  Use your incentive spirometer 4 times a day; do 10 repetitions each time  Remember to keep the small blue ball between the two arrows when taking a slow, deep breath   Pain Management  Get up and walk a short distance to relieve pain and stiffness. Place ice wrap on your hip except when you are walking. The gel ice packs should be changed about every 4 hours.   Elevate your leg on pillows for 15-30 minutes. Pain Medications  Take Tylenol 650mg (take two 325mg tablets) every 6 hours for the next 4 weeks. If needed, take Tramadol (narcotic pain pill) every 6 hours as prescribed. If Tramadol has not relieved your pain within 1 hour, take Oxycodone 5mg (narcotic pain pill). Take Oxycodone only if needed. Stop taking once your pain is tolerable. Take all medications with a small amount of food. As your pain decreases, take the narcotics less often or take ½ of a pill. Call Dr. Naresh Weber if you have side effects from your narcotic pain medication: itching, drowsiness, dizziness, upset stomach, dry mouth, constipation or if you medication is not relieving your pain. Diet after surgery  You may resume your normal diet. Include vegetables, fruit, whole grains, lean meats, and low-fat dairy products. Eat food high in fiber. Drink plenty of fluids, including 8 cups of water daily Take a stool softener (Senokot-s or Colace) to prevent constipation. If constipation occurs you may take a laxative (Milk of Magnesia, Dulcolax tablets). Avoid after surgery  Do not take any over-the-counter medication for pain except Tylenol  Do not take more than 3000mg (3 Grams) of Tylenol in 24 hours  Do not drink alcoholic beverages  Do not smoke  Do not drive until seen for follow up appointment  Do not place frozen gel pack directly on your skin. It can cause frostbite. Do not take a tub bath, swim or get in a hot tub for 8 weeks  Prevention of falls and safety at home  Set up an area where you can rest comfortably leaving space around furniture to allow you to walk with your walker. Keep stairs, hallways and bathrooms well lit; especially at night. Arrange for care for your pets. Keep your home free of clutter.    Call Dr. Naresh Weber at [phone] for:  Pain that is not relieved by pain medication, ice and activity  Side effects of medications  Increased/spread of bruising  Warning signs of infection:  persistent fever greater than 100 degrees  shaking or chills  increased redness, tenderness, swelling or drainage from incision  increased pain during activity or rest  Warning signs of a blood clot in your leg:  increased pain in your calf  tenderness or redness  increased swelling or knee, calf, ankle or foot    Call [phone] after 5pm or on a weekend.  The on call physician will return your phone call  Call your Primary Care Doctor for:   Concerns about your medical conditions such as diabetes, high blood pressure, asthma, congestive heart failure  Blood sugars greater than 180  Persistent headache or dizziness  Coughing or congestion  Constipation or diarrhea  Burning when you go to the bathroom  Abnormal heart rate (fast or slow)      Call 911 and go to the nearest hospital for:   Sudden increased shortness of breath  Sudden onset of chest pain  Difficulty breathing  Localized chest pain with coughing or taking a deep breath

## 2022-12-13 NOTE — PERIOP NOTES
PAT Nurse Practitioner   Pre-Operative Chart Review/Assessment:-ORTHOPEDIC                Patient Name:  Rancho Silva                                                           Age:   79 y.o.    :  1940     Today's Date:  12/15/2022     Date of PAT:   22      Date of Surgery:    22 RESCHEDULED to 22     Procedure(s):  Left Total Hip Arthroplasty     Surgeon:   Dr. Kasi Beltran                       PLAN:      1)  Medical Clearance/PCP:  Antonietta Hernandez MD      2)  Cardiac Clearance:  EKG and METs reviewed. No further cardiac evaluation requested. PAT EKG showed normal sinus rhythm and LAD. 3)  Diabetic Treatment Consult:  Hemoglobin A1c-8.1 in PCP office on 22. Dr. Sanchez Cos office notified of results via fax. Order entered for outpatient referral to [facility]. *Pt returned 22 for repeat Hgb A1c and Fructosamine. A1c-7.2; fruct-269. Pt OK to proceed with surgery. *      4)  Sleep Apnea evaluation:   Not indicated.  OMAR Score 3.       5) Treatment for MRSA/Staph Aureus:  Neg.      6) Additional Concerns:  HTN, T2DM, Asthma              Vital Signs:         Vitals:    22 1208   BP: (!) 147/76   Pulse: 97   Resp: 18   Temp: 98.5 °F (36.9 °C)   Weight: 68.6 kg (151 lb 3.8 oz)   Height: 4' 11\" (1.499 m)          Body mass index is 30.55 kg/m².         ____________________________________________  PAST MEDICAL HISTORY  Past Medical History:   Diagnosis Date    Arthritis     Asthma     CAD (coronary artery disease)     high cholestrol, PT DENIES    Chronic kidney disease     RIGHT KIDNEY STONE    Chronic pain     Diabetes ([address]     History of pancreatitis 2018    Hypertension     Mixed hyperlipidemia 10/28/2011    Thyroid disease     s/p partial thyroidectomy      ____________________________________________  PAST SURGICAL HISTORY  Past Surgical History:   Procedure Laterality Date    HX GYN       x 2    HX OTHER SURGICAL thyroidectomy - partial    CO EGD INTRMURAL NEEDLE ASPIR/BIOP ALTERED ANATOMY  10/31/2011    PT DENIES      ____________________________________________  HOME MEDICATIONS  Current Outpatient Medications   Medication Sig    acetaminophen (Tylenol Arthritis Pain) 650 mg TbER Take 650 mg by mouth every eight (8) hours. ketoconazole (NIZORAL) 2 % topical cream Apply  to affected area daily. APPLY TO SOLES OF FEET DAILY    hydroCHLOROthiazide (HYDRODIURIL) 12.5 mg tablet Take 1 Tablet by mouth daily. valsartan (DIOVAN) 320 mg tablet Take 1 Tablet by mouth daily. levothyroxine (SYNTHROID) 75 mcg tablet Take 1 Tablet by mouth Daily (before breakfast). glipiZIDE (GLUCOTROL) 5 mg tablet TAKE ONE TABLET BY MOUTH EVERY MORNING BEFORE BREAKFAST IF BLOOD SUGAR MEASURES 140 OR HIGHER    atorvastatin (LIPITOR) 80 mg tablet Take 1 Tablet by mouth daily. 1 po daily (Patient taking differently: Take 80 mg by mouth nightly. 1 po daily)    amLODIPine (NORVASC) 5 mg tablet Take 2 Tablets by mouth daily. Indications: high blood pressure    albuterol (PROVENTIL HFA, VENTOLIN HFA, PROAIR HFA) 90 mcg/actuation inhaler Take 1 Puff by inhalation every four (4) hours as needed for Wheezing.    loratadine (CLARITIN) 10 mg tablet Take 1 Tab by mouth daily. For phlegm in throat    MULTIVITAMIN W-MINERALS/LUTEIN (CENTRUM SILVER PO) Take  by mouth.  Takes one po daily      No current facility-administered medications for this encounter.      ____________________________________________  ALLERGIES  Allergies   Allergen Reactions    Aspirin Other (comments)     Pt states she is asthmatic and \"knows not to take ASA\"    Aspirin Unknown (comments)     Unsure of reaction    Codeine Itching     Also nausea, diarrhea, dizziness      Iodine Hives    Shellfish Containing Products Other (comments)     Asthma attack    Shellfish Derived Hives      ____________________________________________  SOCIAL HISTORY  Social History     Tobacco Use    Smoking status: Never    Smokeless tobacco: Never   Substance Use Topics    Alcohol use: Yes     Alcohol/week: 1.0 standard drink     Types: 1 Glasses of wine per week     Comment: 1 glass of wine on Sundays      ____________________________________________   Internal Administration   First Dose COVID-19, MODERNA BLUE border, Primary or Immunocompromised, (age 18y+), IM, 100 mcg/0.5mL  02/05/2021   Second Dose COVID-19, MODERNA BLUE border, Primary or Immunocompromised, (age 18y+), IM, 100 mcg/0.5mL  03/05/2021      Last COVID Lab No results found for: Alondra Erm, Aft[address] Pereira 65, CVD2M, [place], XPLCVT, [facility], [address] [place] [address], [facility] [place] [facility] Visit on 12/12/2022   Component Date Value Ref Range Status    Crossmatch Expiration 12/12/2022 12/17/2022,2359   Final    ABO/Rh(D) 12/12/2022 O POSITIVE   Final    Antibody screen 12/12/2022 NEG   Final    INR 12/12/2022 1.0  0.9 - 1.1   Final    A single therapeutic range for Vit K antagonists may not be optimal for all indications - see June, 2008 issue of Chest, American College of Chest Physicians Evidence-Based Clinical Practice Guidelines, 8th Edition.     Prothrombin time 12/12/2022 10.8  9.0 - 11.1 sec Final    Color 12/12/2022 YELLOW/STRAW    Final    Color Reference Range: Straw, Yellow or Dark Yellow    Appearance 12/12/2022 CLEAR  CLEAR   Final    Specific gravity 12/12/2022 1.015  1.003 - 1.030   Final    pH (UA) 12/12/2022 6.5  5.0 - 8.0   Final    Protein 12/12/2022 Negative  NEG mg/dL Final    Glucose 12/12/2022 Negative  NEG mg/dL Final    Ketone 12/12/2022 Negative  NEG mg/dL Final    Bilirubin 12/12/2022 Negative  NEG   Final    Blood 12/12/2022 Negative  NEG   Final    Urobilinogen 12/12/2022 0.2  0.2 - 1.0 EU/dL Final    Nitrites 12/12/2022 Negative  NEG   Final    Leukocyte Esterase 12/12/2022 Negative  NEG   Final    UA:UC IF INDICATED 12/12/2022 CULTURE NOT INDICATED BY UA RESULT  CNI Final    WBC 12/12/2022 0-4  0 - 4 /hpf Final    RBC 12/12/2022 0-5  0 - 5 /hpf Final    Epithelial cells 12/12/2022 FEW  FEW /lpf Final    Epithelial cell category consists of squamous cells and /or transitional urothelial cells. Renal tubular cells, if present, are separately identified as such. Bacteria 12/12/2022 Negative  NEG /hpf Final    Hyaline cast 12/12/2022 0-2  0 - 5 /lpf Final    Ventricular Rate 12/12/2022 91  BPM Final    Atrial Rate 12/12/2022 91  BPM Final    P-R Interval 12/12/2022 162  ms Final    QRS Duration 12/12/2022 74  ms Final    Q-T Interval 12/12/2022 354  ms Final    QTC Calculation (Bezet) 12/12/2022 435  ms Final    Calculated P Axis 12/12/2022 37  degrees Final    Calculated R Axis 12/12/2022 -31  degrees Final    Calculated T Axis 12/12/2022 53  degrees Final    Diagnosis 12/12/2022    Final                    Value:Normal sinus rhythm  Possible Left atrial enlargement  Left axis deviation  Low voltage QRS  No previous ECGs available  Confirmed by Leonidas Boo M.D., Ji Oneal ([id number]) on 12/12/2022 12:47:08 PM      Special Requests: 12/12/2022 NO SPECIAL REQUESTS    Final    Culture result: 12/12/2022 MRSA NOT PRESENT    Final    Culture result: 12/12/2022     Final                    Value:Screening of patient nares for MRSA is for surveillance purposes and, if positive, to facilitate isolation considerations in high risk settings. It is not intended for automatic decolonization interventions per se as regimens are not sufficiently effective to warrant routine use.       Hemoglobin A1c 12/14/2022 7.2 (A)  4.0 - 5.6 % Final    Comment: NEW METHOD  PLEASE NOTE NEW REFERENCE RANGE  (NOTE)  HbA1C Interpretive Ranges  <5.7              Normal  5.7 - 6.4         Consider Prediabetes  >6.5              Consider Diabetes      Est. average glucose 12/14/2022 160  mg/dL Final    Fructosamine 12/14/2022 269  0 - 285 umol/L Final    Comment: (NOTE)  Published reference interval for apparently healthy subjects  between age 21 and 61 is 1 - 5 umol/L and in a poorly  controlled diabetic population is 228 - 563 umol/L with a  mean of 396 umol/L. Performed At: [address] [id number]  Saravanan Hernandez MD [id number]     Office Visit on 12/07/2022   Component Date Value Ref Range Status    Glucose POC 12/07/2022 126-f  MG/DL Final   Hospital Outpatient Visit on 11/16/2022   Component Date Value Ref Range Status    Hemoglobin A1c 11/16/2022 8.1 (A)  4.0 - 5.6 % Final    Comment: NEW METHOD  PLEASE NOTE NEW REFERENCE RANGE  (NOTE)  HbA1C Interpretive Ranges  <5.7              Normal  5.7 - 6.4         Consider Prediabetes  >6.5              Consider Diabetes      Est. average glucose 11/16/2022 186  mg/dL Final    Sodium 11/16/2022 144  136 - 145 mmol/L Final    Potassium 11/16/2022 3.9  3.5 - 5.1 mmol/L Final    Chloride 11/16/2022 106  97 - 108 mmol/L Final    CO2 11/16/2022 32  21 - 32 mmol/L Final    Anion gap 11/16/2022 6  5 - 15 mmol/L Final    Glucose 11/16/2022 147 (A)  65 - 100 mg/dL Final    BUN 11/16/2022 20  6 - 20 MG/DL Final    Creatinine 11/16/2022 1.22 (A)  0.55 - 1.02 MG/DL Final    BUN/Creatinine ratio 11/16/2022 16  12 - 20   Final    eGFR 11/16/2022 44 (A)  >60 ml/min/1.73m2 Final    Comment:      Pediatric calculator link: [email or web address] [email or web address]/kdoqi/gfr_[email or web address]       Effective Oct 3, 2022       These results are not intended for use in patients <25years of age. eGFR results are calculated without a race factor using  the 2021 CKD-EPI equation. Careful clinical correlation is recommended, particularly when comparing to results calculated using previous equations. The CKD-EPI equation is less accurate in patients with extremes of muscle mass, extra-renal metabolism of creatinine, excessive creatine ingestion, or following therapy that affects renal tubular secretion.       Calcium 11/16/2022 9.9  8.5 - 10.1 MG/DL Final Bilirubin, total 11/16/2022 0.8  0.2 - 1.0 MG/DL Final    ALT (SGPT) 11/16/2022 26  12 - 78 U/L Final    AST (SGOT) 11/16/2022 16  15 - 37 U/L Final    Alk. phosphatase 11/16/2022 119 (A)  45 - 117 U/L Final    Protein, total 11/16/2022 7.3  6.4 - 8.2 g/dL Final    Albumin 11/16/2022 3.8  3.5 - 5.0 g/dL Final    Globulin 11/16/2022 3.5  2.0 - 4.0 g/dL Final    A-G Ratio 11/16/2022 1.1  1.1 - 2.2   Final    WBC 11/16/2022 9.2  3.6 - 11.0 K/uL Final    RBC 11/16/2022 5.02  3.80 - 5.20 M/uL Final    HGB 11/16/2022 14.0  11.5 - 16.0 g/dL Final    HCT 11/16/2022 42.1  35.0 - 47.0 % Final    MCV 11/16/2022 83.9  80.0 - 99.0 FL Final    MCH 11/16/2022 27.9  26.0 - 34.0 PG Final    MCHC 11/16/2022 33.3  30.0 - 36.5 g/dL Final    RDW 11/16/2022 16.1 (A)  11.5 - 14.5 % Final    PLATELET 59/66/9369 507  150 - 400 K/uL Final    MPV 11/16/2022 11.4  8.9 - 12.9 FL Final    NRBC 11/16/2022 0.0  0  WBC Final    ABSOLUTE NRBC 11/16/2022 0.00  0.00 - 0.01 K/uL Final    NEUTROPHILS 11/16/2022 63  32 - 75 % Final    LYMPHOCYTES 11/16/2022 29  12 - 49 % Final    MONOCYTES 11/16/2022 8  5 - 13 % Final    EOSINOPHILS 11/16/2022 0  0 - 7 % Final    BASOPHILS 11/16/2022 0  0 - 1 % Final    IMMATURE GRANULOCYTES 11/16/2022 0  0.0 - 0.5 % Final    ABS. NEUTROPHILS 11/16/2022 5.7  1.8 - 8.0 K/UL Final    ABS. LYMPHOCYTES 11/16/2022 2.7  0.8 - 3.5 K/UL Final    ABS. MONOCYTES 11/16/2022 0.7  0.0 - 1.0 K/UL Final    ABS. EOSINOPHILS 11/16/2022 0.0  0.0 - 0.4 K/UL Final    ABS. BASOPHILS 11/16/2022 0.0  0.0 - 0.1 K/UL Final    ABS. IMM. GRANS. 11/16/2022 0.0  0.00 - 0.04 K/UL Final    DF 11/16/2022 AUTOMATED    Final   Office Visit on 11/16/2022   Component Date Value Ref Range Status    Glucose POC 11/16/2022 137  MG/DL Final       Skin:     Denies open wounds, cuts, sores, rashes or other areas of concern in PAT assessment.           Enzo Claire NP  Available via [employer]

## 2022-12-13 NOTE — PERIOP NOTES
Received call from [facility] at Dr. Angel Parker office asking if hgba1c can be added on to labs drawn 12/12/22. Left message on voicemail for Georgie Linda at Dr. Angel Parker office notifying her pt did not have CBC drawn in PAT so HGBA1C can not be added on. Spoke with [facility] at Dr. Angel Parker office who asked that pt come to PAT 12/14/22 for HGBA1C and Fructosamine level. [facility] asked for 9am appt. [facility] stated she will notify pt to come to PAT at 9am on 12/14/22. [facility] stated pt's surgery is to be rescheduled for 12/21/22. Spoke with PAT , [place] and asked her to schedule pt for lab draw 9am 12/14/22.

## 2022-12-14 ENCOUNTER — HOSPITAL ENCOUNTER (OUTPATIENT)
Dept: PREADMISSION TESTING | Age: 82
Discharge: HOME | End: 2022-12-14
Payer: SELF-PAY

## 2022-12-14 LAB
EST. AVERAGE GLUCOSE BLD GHB EST-MCNC: 160 MG/DL
HBA1C MFR BLD: 7.2 % (ref 4–5.6)

## 2022-12-14 PROCEDURE — 83036 HEMOGLOBIN GLYCOSYLATED A1C: CPT

## 2022-12-14 PROCEDURE — 82985 ASSAY OF GLYCATED PROTEIN: CPT

## 2022-12-14 PROCEDURE — 36415 COLL VENOUS BLD VENIPUNCTURE: CPT

## 2022-12-15 LAB — FRUCTOSAMINE SERPL-SCNC: 269 UMOL/L (ref 0–285)

## 2022-12-21 ENCOUNTER — HOSPITAL ENCOUNTER (OUTPATIENT)
Age: 82
Discharge: HOME | End: 2022-12-22
Attending: ORTHOPAEDIC SURGERY | Admitting: ORTHOPAEDIC SURGERY
Payer: MEDICAID

## 2022-12-21 ENCOUNTER — APPOINTMENT (OUTPATIENT)
Dept: GENERAL RADIOLOGY | Age: 82
End: 2022-12-21
Attending: ORTHOPAEDIC SURGERY
Payer: MEDICAID

## 2022-12-21 ENCOUNTER — ANESTHESIA (OUTPATIENT)
Dept: SURGERY | Age: 82
End: 2022-12-21
Payer: MEDICAID

## 2022-12-21 ENCOUNTER — APPOINTMENT (OUTPATIENT)
Dept: GENERAL RADIOLOGY | Age: 82
End: 2022-12-21
Attending: PHYSICIAN ASSISTANT
Payer: MEDICAID

## 2022-12-21 ENCOUNTER — ANESTHESIA EVENT (OUTPATIENT)
Dept: SURGERY | Age: 82
End: 2022-12-21
Payer: MEDICAID

## 2022-12-21 DIAGNOSIS — M16.0 PRIMARY OSTEOARTHRITIS OF BOTH HIPS: Primary | ICD-10-CM

## 2022-12-21 DIAGNOSIS — M16.12 OSTEOARTHRITIS OF LEFT HIP, UNSPECIFIED OSTEOARTHRITIS TYPE: ICD-10-CM

## 2022-12-21 LAB
ABO + RH BLD: NORMAL
BLOOD GROUP ANTIBODIES SERPL: NORMAL
GLUCOSE BLD STRIP.AUTO-MCNC: 117 MG/DL (ref 65–117)
GLUCOSE BLD STRIP.AUTO-MCNC: 120 MG/DL (ref 65–117)
GLUCOSE BLD STRIP.AUTO-MCNC: 137 MG/DL (ref 65–117)
GLUCOSE BLD STRIP.AUTO-MCNC: 138 MG/DL (ref 65–117)
SERVICE CMNT-IMP: ABNORMAL
SERVICE CMNT-IMP: NORMAL
SPECIMEN EXP DATE BLD: NORMAL

## 2022-12-21 PROCEDURE — 97530 THERAPEUTIC ACTIVITIES: CPT

## 2022-12-21 PROCEDURE — 73501 X-RAY EXAM HIP UNI 1 VIEW: CPT

## 2022-12-21 PROCEDURE — 77030041279 HC DRSG PRMSL AG MDII -B: Performed by: ORTHOPAEDIC SURGERY

## 2022-12-21 PROCEDURE — 74011250636 HC RX REV CODE- 250/636: Performed by: NURSE ANESTHETIST, CERTIFIED REGISTERED

## 2022-12-21 PROCEDURE — 77030002933 HC SUT MCRYL J&J -A: Performed by: ORTHOPAEDIC SURGERY

## 2022-12-21 PROCEDURE — 97116 GAIT TRAINING THERAPY: CPT

## 2022-12-21 PROCEDURE — 74011250636 HC RX REV CODE- 250/636: Performed by: ANESTHESIOLOGY

## 2022-12-21 PROCEDURE — 36415 COLL VENOUS BLD VENIPUNCTURE: CPT

## 2022-12-21 PROCEDURE — 77030026438 HC STYL ET INTUB CARD -A: Performed by: ANESTHESIOLOGY

## 2022-12-21 PROCEDURE — 74011250637 HC RX REV CODE- 250/637: Performed by: PHYSICIAN ASSISTANT

## 2022-12-21 PROCEDURE — 74011000250 HC RX REV CODE- 250: Performed by: NURSE ANESTHETIST, CERTIFIED REGISTERED

## 2022-12-21 PROCEDURE — 77030006802 HC BLD SAW RECIP BRSM -B: Performed by: ORTHOPAEDIC SURGERY

## 2022-12-21 PROCEDURE — 72170 X-RAY EXAM OF PELVIS: CPT

## 2022-12-21 PROCEDURE — 76010000162 HC OR TIME 1.5 TO 2 HR INTENSV-TIER 1: Performed by: ORTHOPAEDIC SURGERY

## 2022-12-21 PROCEDURE — 2709999900 HC NON-CHARGEABLE SUPPLY: Performed by: ORTHOPAEDIC SURGERY

## 2022-12-21 PROCEDURE — 76210000017 HC OR PH I REC 1.5 TO 2 HR: Performed by: ORTHOPAEDIC SURGERY

## 2022-12-21 PROCEDURE — 74011000250 HC RX REV CODE- 250: Performed by: PHYSICIAN ASSISTANT

## 2022-12-21 PROCEDURE — 74011000258 HC RX REV CODE- 258: Performed by: ORTHOPAEDIC SURGERY

## 2022-12-21 PROCEDURE — 0054T BONE SRGRY CMPTR FLUOR IMAGE: CPT | Performed by: ORTHOPAEDIC SURGERY

## 2022-12-21 PROCEDURE — C9290 INJ, BUPIVACAINE LIPOSOME: HCPCS | Performed by: ORTHOPAEDIC SURGERY

## 2022-12-21 PROCEDURE — 97161 PT EVAL LOW COMPLEX 20 MIN: CPT

## 2022-12-21 PROCEDURE — 77030010507 HC ADH SKN DERMBND J&J -B: Performed by: ORTHOPAEDIC SURGERY

## 2022-12-21 PROCEDURE — 27130 TOTAL HIP ARTHROPLASTY: CPT | Performed by: ORTHOPAEDIC SURGERY

## 2022-12-21 PROCEDURE — 27130 TOTAL HIP ARTHROPLASTY: CPT | Performed by: PHYSICIAN ASSISTANT

## 2022-12-21 PROCEDURE — 77030018547 HC SUT ETHBND1 J&J -B: Performed by: ORTHOPAEDIC SURGERY

## 2022-12-21 PROCEDURE — 77030036660

## 2022-12-21 PROCEDURE — 74011000250 HC RX REV CODE- 250: Performed by: ORTHOPAEDIC SURGERY

## 2022-12-21 PROCEDURE — 77030020788: Performed by: ORTHOPAEDIC SURGERY

## 2022-12-21 PROCEDURE — 77030011264 HC ELECTRD BLD EXT COVD -A: Performed by: ORTHOPAEDIC SURGERY

## 2022-12-21 PROCEDURE — 86900 BLOOD TYPING SEROLOGIC ABO: CPT

## 2022-12-21 PROCEDURE — 77030008684 HC TU ET CUF COVD -B: Performed by: ANESTHESIOLOGY

## 2022-12-21 PROCEDURE — 74011250636 HC RX REV CODE- 250/636: Performed by: PHYSICIAN ASSISTANT

## 2022-12-21 PROCEDURE — 77030006822 HC BLD SAW SAG BRSM -B: Performed by: ORTHOPAEDIC SURGERY

## 2022-12-21 PROCEDURE — 74011000258 HC RX REV CODE- 258: Performed by: PHYSICIAN ASSISTANT

## 2022-12-21 PROCEDURE — 74011250636 HC RX REV CODE- 250/636: Performed by: ORTHOPAEDIC SURGERY

## 2022-12-21 PROCEDURE — 82962 GLUCOSE BLOOD TEST: CPT

## 2022-12-21 PROCEDURE — 76060000035 HC ANESTHESIA 2 TO 2.5 HR: Performed by: ORTHOPAEDIC SURGERY

## 2022-12-21 PROCEDURE — 77030035236 HC SUT PDS STRATFX BARB J&J -B: Performed by: ORTHOPAEDIC SURGERY

## 2022-12-21 PROCEDURE — 77030031139 HC SUT VCRL2 J&J -A: Performed by: ORTHOPAEDIC SURGERY

## 2022-12-21 DEVICE — PINNACLE CANCELLOUS BONE SCREW 6.5MM X 25MM
Type: IMPLANTABLE DEVICE | Site: HIP | Status: FUNCTIONAL
Brand: PINNACLE

## 2022-12-21 DEVICE — PINNACLE GRIPTION ACETABULAR SHELL SECTOR 48MM OD
Type: IMPLANTABLE DEVICE | Site: HIP | Status: FUNCTIONAL
Brand: PINNACLE GRIPTION

## 2022-12-21 DEVICE — SCREW BNE L35MM DIA6.5MM CANC HIP DOME PINN: Type: IMPLANTABLE DEVICE | Site: HIP | Status: FUNCTIONAL

## 2022-12-21 DEVICE — BIOLOX DELTA CERAMIC FEMORAL HEAD 32MM DIA +5.0 12/14 TAPER
Type: IMPLANTABLE DEVICE | Site: HIP | Status: FUNCTIONAL
Brand: BIOLOX DELTA

## 2022-12-21 DEVICE — PINNACLE HIP SOLUTIONS ALTRX POLYETHYLENE ACETABULAR LINER NEUTRAL 32MM ID 48MM OD
Type: IMPLANTABLE DEVICE | Site: HIP | Status: FUNCTIONAL
Brand: PINNACLE ALTRX

## 2022-12-21 DEVICE — ACTIS DUOFIX HIP PROSTHESIS (FEMORAL STEM 12/14 TAPER CEMENTLESS SIZE 1 STD COLLAR)  CE
Type: IMPLANTABLE DEVICE | Site: HIP | Status: FUNCTIONAL
Brand: ACTIS

## 2022-12-21 RX ORDER — CELECOXIB 200 MG/1
200 CAPSULE ORAL ONCE
Status: COMPLETED | OUTPATIENT
Start: 2022-12-21 | End: 2022-12-21

## 2022-12-21 RX ORDER — NALOXONE HYDROCHLORIDE 0.4 MG/ML
0.4 INJECTION, SOLUTION INTRAMUSCULAR; INTRAVENOUS; SUBCUTANEOUS AS NEEDED
Status: DISCONTINUED | OUTPATIENT
Start: 2022-12-21 | End: 2022-12-22 | Stop reason: HOSPADM

## 2022-12-21 RX ORDER — POLYETHYLENE GLYCOL 3350 17 G/17G
17 POWDER, FOR SOLUTION ORAL DAILY
Status: DISCONTINUED | OUTPATIENT
Start: 2022-12-22 | End: 2022-12-22 | Stop reason: HOSPADM

## 2022-12-21 RX ORDER — SODIUM CHLORIDE, SODIUM LACTATE, POTASSIUM CHLORIDE, CALCIUM CHLORIDE 600; 310; 30; 20 MG/100ML; MG/100ML; MG/100ML; MG/100ML
125 INJECTION, SOLUTION INTRAVENOUS CONTINUOUS
Status: DISCONTINUED | OUTPATIENT
Start: 2022-12-21 | End: 2022-12-21 | Stop reason: HOSPADM

## 2022-12-21 RX ORDER — SODIUM CHLORIDE 9 MG/ML
125 INJECTION, SOLUTION INTRAVENOUS CONTINUOUS
Status: DISPENSED | OUTPATIENT
Start: 2022-12-21 | End: 2022-12-22

## 2022-12-21 RX ORDER — FAMOTIDINE 20 MG/1
20 TABLET, FILM COATED ORAL DAILY
Status: DISCONTINUED | OUTPATIENT
Start: 2022-12-21 | End: 2022-12-22 | Stop reason: HOSPADM

## 2022-12-21 RX ORDER — HYDROCHLOROTHIAZIDE 25 MG/1
12.5 TABLET ORAL DAILY
Status: DISCONTINUED | OUTPATIENT
Start: 2022-12-22 | End: 2022-12-22 | Stop reason: HOSPADM

## 2022-12-21 RX ORDER — OXYCODONE HYDROCHLORIDE 5 MG/1
5 TABLET ORAL
Status: DISCONTINUED | OUTPATIENT
Start: 2022-12-21 | End: 2022-12-22 | Stop reason: HOSPADM

## 2022-12-21 RX ORDER — FENTANYL CITRATE 50 UG/ML
25 INJECTION, SOLUTION INTRAMUSCULAR; INTRAVENOUS
Status: COMPLETED | OUTPATIENT
Start: 2022-12-21 | End: 2022-12-21

## 2022-12-21 RX ORDER — PROPOFOL 10 MG/ML
INJECTION, EMULSION INTRAVENOUS AS NEEDED
Status: DISCONTINUED | OUTPATIENT
Start: 2022-12-21 | End: 2022-12-21 | Stop reason: HOSPADM

## 2022-12-21 RX ORDER — GLIPIZIDE 5 MG/1
5 TABLET ORAL
Status: DISCONTINUED | OUTPATIENT
Start: 2022-12-22 | End: 2022-12-22 | Stop reason: HOSPADM

## 2022-12-21 RX ORDER — AMLODIPINE BESYLATE 5 MG/1
10 TABLET ORAL DAILY
Status: DISCONTINUED | OUTPATIENT
Start: 2022-12-22 | End: 2022-12-22 | Stop reason: HOSPADM

## 2022-12-21 RX ORDER — MIDAZOLAM HYDROCHLORIDE 1 MG/ML
INJECTION, SOLUTION INTRAMUSCULAR; INTRAVENOUS AS NEEDED
Status: DISCONTINUED | OUTPATIENT
Start: 2022-12-21 | End: 2022-12-21 | Stop reason: HOSPADM

## 2022-12-21 RX ORDER — ATORVASTATIN CALCIUM 40 MG/1
80 TABLET, FILM COATED ORAL
Status: DISCONTINUED | OUTPATIENT
Start: 2022-12-21 | End: 2022-12-22 | Stop reason: HOSPADM

## 2022-12-21 RX ORDER — SODIUM CHLORIDE 0.9 % (FLUSH) 0.9 %
5-40 SYRINGE (ML) INJECTION AS NEEDED
Status: DISCONTINUED | OUTPATIENT
Start: 2022-12-21 | End: 2022-12-21 | Stop reason: HOSPADM

## 2022-12-21 RX ORDER — TRAMADOL HYDROCHLORIDE 50 MG/1
50 TABLET ORAL
Status: DISCONTINUED | OUTPATIENT
Start: 2022-12-21 | End: 2022-12-22 | Stop reason: HOSPADM

## 2022-12-21 RX ORDER — ONDANSETRON 2 MG/ML
4 INJECTION INTRAMUSCULAR; INTRAVENOUS
Status: DISCONTINUED | OUTPATIENT
Start: 2022-12-21 | End: 2022-12-22 | Stop reason: HOSPADM

## 2022-12-21 RX ORDER — FENTANYL CITRATE 50 UG/ML
50 INJECTION, SOLUTION INTRAMUSCULAR; INTRAVENOUS AS NEEDED
Status: DISCONTINUED | OUTPATIENT
Start: 2022-12-21 | End: 2022-12-21 | Stop reason: HOSPADM

## 2022-12-21 RX ORDER — HYDROMORPHONE HYDROCHLORIDE 2 MG/ML
INJECTION, SOLUTION INTRAMUSCULAR; INTRAVENOUS; SUBCUTANEOUS AS NEEDED
Status: DISCONTINUED | OUTPATIENT
Start: 2022-12-21 | End: 2022-12-21 | Stop reason: HOSPADM

## 2022-12-21 RX ORDER — ACETAMINOPHEN 500 MG
1000 TABLET ORAL ONCE
Status: COMPLETED | OUTPATIENT
Start: 2022-12-21 | End: 2022-12-21

## 2022-12-21 RX ORDER — MORPHINE SULFATE 2 MG/ML
2 INJECTION, SOLUTION INTRAMUSCULAR; INTRAVENOUS
Status: DISCONTINUED | OUTPATIENT
Start: 2022-12-21 | End: 2022-12-21 | Stop reason: HOSPADM

## 2022-12-21 RX ORDER — PREGABALIN 75 MG/1
75 CAPSULE ORAL ONCE
Status: COMPLETED | OUTPATIENT
Start: 2022-12-21 | End: 2022-12-21

## 2022-12-21 RX ORDER — MIDAZOLAM HYDROCHLORIDE 1 MG/ML
1 INJECTION, SOLUTION INTRAMUSCULAR; INTRAVENOUS AS NEEDED
Status: DISCONTINUED | OUTPATIENT
Start: 2022-12-21 | End: 2022-12-21 | Stop reason: HOSPADM

## 2022-12-21 RX ORDER — KETAMINE HYDROCHLORIDE 50 MG/ML
INJECTION, SOLUTION INTRAMUSCULAR; INTRAVENOUS AS NEEDED
Status: DISCONTINUED | OUTPATIENT
Start: 2022-12-21 | End: 2022-12-21 | Stop reason: HOSPADM

## 2022-12-21 RX ORDER — GLYCOPYRROLATE 0.2 MG/ML
INJECTION INTRAMUSCULAR; INTRAVENOUS AS NEEDED
Status: DISCONTINUED | OUTPATIENT
Start: 2022-12-21 | End: 2022-12-21 | Stop reason: HOSPADM

## 2022-12-21 RX ORDER — OXYCODONE AND ACETAMINOPHEN 5; 325 MG/1; MG/1
1 TABLET ORAL AS NEEDED
Status: DISCONTINUED | OUTPATIENT
Start: 2022-12-21 | End: 2022-12-21 | Stop reason: HOSPADM

## 2022-12-21 RX ORDER — SODIUM CHLORIDE 0.9 % (FLUSH) 0.9 %
5-40 SYRINGE (ML) INJECTION EVERY 8 HOURS
Status: DISCONTINUED | OUTPATIENT
Start: 2022-12-21 | End: 2022-12-21 | Stop reason: HOSPADM

## 2022-12-21 RX ORDER — HYDROXYZINE HYDROCHLORIDE 10 MG/1
10 TABLET, FILM COATED ORAL
Status: DISCONTINUED | OUTPATIENT
Start: 2022-12-21 | End: 2022-12-22 | Stop reason: HOSPADM

## 2022-12-21 RX ORDER — HYDROMORPHONE HYDROCHLORIDE 1 MG/ML
0.5 INJECTION, SOLUTION INTRAMUSCULAR; INTRAVENOUS; SUBCUTANEOUS
Status: DISCONTINUED | OUTPATIENT
Start: 2022-12-21 | End: 2022-12-22 | Stop reason: HOSPADM

## 2022-12-21 RX ORDER — LIDOCAINE HYDROCHLORIDE 10 MG/ML
0.1 INJECTION, SOLUTION EPIDURAL; INFILTRATION; INTRACAUDAL; PERINEURAL AS NEEDED
Status: DISCONTINUED | OUTPATIENT
Start: 2022-12-21 | End: 2022-12-21 | Stop reason: HOSPADM

## 2022-12-21 RX ORDER — DIPHENHYDRAMINE HYDROCHLORIDE 50 MG/ML
12.5 INJECTION, SOLUTION INTRAMUSCULAR; INTRAVENOUS AS NEEDED
Status: DISCONTINUED | OUTPATIENT
Start: 2022-12-21 | End: 2022-12-21 | Stop reason: HOSPADM

## 2022-12-21 RX ORDER — HYDROMORPHONE HYDROCHLORIDE 1 MG/ML
0.2 INJECTION, SOLUTION INTRAMUSCULAR; INTRAVENOUS; SUBCUTANEOUS
Status: DISCONTINUED | OUTPATIENT
Start: 2022-12-21 | End: 2022-12-21 | Stop reason: HOSPADM

## 2022-12-21 RX ORDER — ONDANSETRON 2 MG/ML
4 INJECTION INTRAMUSCULAR; INTRAVENOUS AS NEEDED
Status: DISCONTINUED | OUTPATIENT
Start: 2022-12-21 | End: 2022-12-21 | Stop reason: HOSPADM

## 2022-12-21 RX ORDER — ROCURONIUM BROMIDE 10 MG/ML
INJECTION, SOLUTION INTRAVENOUS AS NEEDED
Status: DISCONTINUED | OUTPATIENT
Start: 2022-12-21 | End: 2022-12-21 | Stop reason: HOSPADM

## 2022-12-21 RX ORDER — AMOXICILLIN 250 MG
1 CAPSULE ORAL 2 TIMES DAILY
Status: DISCONTINUED | OUTPATIENT
Start: 2022-12-21 | End: 2022-12-22 | Stop reason: HOSPADM

## 2022-12-21 RX ORDER — MAGNESIUM SULFATE 100 %
4 CRYSTALS MISCELLANEOUS AS NEEDED
Status: DISCONTINUED | OUTPATIENT
Start: 2022-12-21 | End: 2022-12-22 | Stop reason: HOSPADM

## 2022-12-21 RX ORDER — ACETAMINOPHEN 325 MG/1
650 TABLET ORAL ONCE
Status: DISCONTINUED | OUTPATIENT
Start: 2022-12-21 | End: 2022-12-21 | Stop reason: SDUPTHER

## 2022-12-21 RX ORDER — SODIUM CHLORIDE 0.9 % (FLUSH) 0.9 %
5-40 SYRINGE (ML) INJECTION EVERY 8 HOURS
Status: DISCONTINUED | OUTPATIENT
Start: 2022-12-21 | End: 2022-12-22 | Stop reason: HOSPADM

## 2022-12-21 RX ORDER — PHENYLEPHRINE HCL IN 0.9% NACL 0.4MG/10ML
SYRINGE (ML) INTRAVENOUS AS NEEDED
Status: DISCONTINUED | OUTPATIENT
Start: 2022-12-21 | End: 2022-12-21 | Stop reason: HOSPADM

## 2022-12-21 RX ORDER — ACETAMINOPHEN 325 MG/1
650 TABLET ORAL EVERY 6 HOURS
Status: DISCONTINUED | OUTPATIENT
Start: 2022-12-21 | End: 2022-12-22 | Stop reason: HOSPADM

## 2022-12-21 RX ORDER — DEXAMETHASONE SODIUM PHOSPHATE 4 MG/ML
INJECTION, SOLUTION INTRA-ARTICULAR; INTRALESIONAL; INTRAMUSCULAR; INTRAVENOUS; SOFT TISSUE AS NEEDED
Status: DISCONTINUED | OUTPATIENT
Start: 2022-12-21 | End: 2022-12-21

## 2022-12-21 RX ORDER — HYDROMORPHONE HYDROCHLORIDE 1 MG/ML
INJECTION, SOLUTION INTRAMUSCULAR; INTRAVENOUS; SUBCUTANEOUS
Status: DISPENSED
Start: 2022-12-21 | End: 2022-12-22

## 2022-12-21 RX ORDER — DEXAMETHASONE SODIUM PHOSPHATE 10 MG/ML
10 INJECTION INTRAMUSCULAR; INTRAVENOUS ONCE
Status: COMPLETED | OUTPATIENT
Start: 2022-12-21 | End: 2022-12-21

## 2022-12-21 RX ORDER — ONDANSETRON 2 MG/ML
INJECTION INTRAMUSCULAR; INTRAVENOUS AS NEEDED
Status: DISCONTINUED | OUTPATIENT
Start: 2022-12-21 | End: 2022-12-21 | Stop reason: HOSPADM

## 2022-12-21 RX ORDER — INSULIN LISPRO 100 [IU]/ML
INJECTION, SOLUTION INTRAVENOUS; SUBCUTANEOUS
Status: DISCONTINUED | OUTPATIENT
Start: 2022-12-21 | End: 2022-12-22 | Stop reason: HOSPADM

## 2022-12-21 RX ORDER — FACIAL-BODY WIPES
10 EACH TOPICAL DAILY PRN
Status: DISCONTINUED | OUTPATIENT
Start: 2022-12-22 | End: 2022-12-22 | Stop reason: HOSPADM

## 2022-12-21 RX ORDER — NEOSTIGMINE METHYLSULFATE 1 MG/ML
INJECTION, SOLUTION INTRAVENOUS AS NEEDED
Status: DISCONTINUED | OUTPATIENT
Start: 2022-12-21 | End: 2022-12-21 | Stop reason: HOSPADM

## 2022-12-21 RX ORDER — LIDOCAINE HYDROCHLORIDE 20 MG/ML
INJECTION, SOLUTION EPIDURAL; INFILTRATION; INTRACAUDAL; PERINEURAL AS NEEDED
Status: DISCONTINUED | OUTPATIENT
Start: 2022-12-21 | End: 2022-12-21 | Stop reason: HOSPADM

## 2022-12-21 RX ORDER — SODIUM CHLORIDE 0.9 % (FLUSH) 0.9 %
5-40 SYRINGE (ML) INJECTION AS NEEDED
Status: DISCONTINUED | OUTPATIENT
Start: 2022-12-21 | End: 2022-12-22 | Stop reason: HOSPADM

## 2022-12-21 RX ORDER — MIDAZOLAM HYDROCHLORIDE 1 MG/ML
0.5 INJECTION, SOLUTION INTRAMUSCULAR; INTRAVENOUS
Status: DISCONTINUED | OUTPATIENT
Start: 2022-12-21 | End: 2022-12-21 | Stop reason: HOSPADM

## 2022-12-21 RX ORDER — SUCCINYLCHOLINE CHLORIDE 20 MG/ML
INJECTION INTRAMUSCULAR; INTRAVENOUS AS NEEDED
Status: DISCONTINUED | OUTPATIENT
Start: 2022-12-21 | End: 2022-12-21 | Stop reason: HOSPADM

## 2022-12-21 RX ORDER — SODIUM CHLORIDE 9 MG/ML
1000 INJECTION, SOLUTION INTRAVENOUS CONTINUOUS
Status: DISCONTINUED | OUTPATIENT
Start: 2022-12-21 | End: 2022-12-21 | Stop reason: HOSPADM

## 2022-12-21 RX ORDER — LEVOTHYROXINE SODIUM 75 UG/1
75 TABLET ORAL
Status: DISCONTINUED | OUTPATIENT
Start: 2022-12-22 | End: 2022-12-22 | Stop reason: HOSPADM

## 2022-12-21 RX ORDER — SODIUM CHLORIDE 9 MG/ML
50 INJECTION, SOLUTION INTRAVENOUS CONTINUOUS
Status: DISCONTINUED | OUTPATIENT
Start: 2022-12-21 | End: 2022-12-21 | Stop reason: HOSPADM

## 2022-12-21 RX ORDER — VALSARTAN 160 MG/1
320 TABLET ORAL DAILY
Status: DISCONTINUED | OUTPATIENT
Start: 2022-12-22 | End: 2022-12-22 | Stop reason: HOSPADM

## 2022-12-21 RX ADMIN — NEOSTIGMINE METHYLSULFATE 4 MG: 1 INJECTION, SOLUTION INTRAVENOUS at 11:45

## 2022-12-21 RX ADMIN — CEFAZOLIN 2 G: 1 INJECTION, POWDER, FOR SOLUTION INTRAMUSCULAR; INTRAVENOUS at 17:22

## 2022-12-21 RX ADMIN — SODIUM CHLORIDE, PRESERVATIVE FREE 10 ML: 5 INJECTION INTRAVENOUS at 22:16

## 2022-12-21 RX ADMIN — WATER 2 G: 1 INJECTION INTRAMUSCULAR; INTRAVENOUS; SUBCUTANEOUS at 10:37

## 2022-12-21 RX ADMIN — ONDANSETRON HYDROCHLORIDE 4 MG: 2 INJECTION, SOLUTION INTRAMUSCULAR; INTRAVENOUS at 11:41

## 2022-12-21 RX ADMIN — HYDROMORPHONE HYDROCHLORIDE 0.2 MG: 1 INJECTION, SOLUTION INTRAMUSCULAR; INTRAVENOUS; SUBCUTANEOUS at 12:41

## 2022-12-21 RX ADMIN — PROPOFOL 130 MG: 10 INJECTION, EMULSION INTRAVENOUS at 10:32

## 2022-12-21 RX ADMIN — HYDROMORPHONE HYDROCHLORIDE 0.2 MG: 1 INJECTION, SOLUTION INTRAMUSCULAR; INTRAVENOUS; SUBCUTANEOUS at 12:56

## 2022-12-21 RX ADMIN — TRANEXAMIC ACID 1 G: 100 INJECTION, SOLUTION INTRAVENOUS at 10:44

## 2022-12-21 RX ADMIN — KETAMINE HYDROCHLORIDE 20 MG: 50 INJECTION, SOLUTION INTRAMUSCULAR; INTRAVENOUS at 10:24

## 2022-12-21 RX ADMIN — PROPOFOL 20 MG: 10 INJECTION, EMULSION INTRAVENOUS at 10:25

## 2022-12-21 RX ADMIN — SODIUM CHLORIDE, POTASSIUM CHLORIDE, SODIUM LACTATE AND CALCIUM CHLORIDE 125 ML/HR: 600; 310; 30; 20 INJECTION, SOLUTION INTRAVENOUS at 09:26

## 2022-12-21 RX ADMIN — GLYCOPYRROLATE 0.6 MG: 0.2 INJECTION INTRAMUSCULAR; INTRAVENOUS at 11:45

## 2022-12-21 RX ADMIN — HYDROMORPHONE HYDROCHLORIDE 0.2 MG: 1 INJECTION, SOLUTION INTRAMUSCULAR; INTRAVENOUS; SUBCUTANEOUS at 12:26

## 2022-12-21 RX ADMIN — PREGABALIN 75 MG: 75 CAPSULE ORAL at 09:26

## 2022-12-21 RX ADMIN — FAMOTIDINE 20 MG: 20 TABLET, FILM COATED ORAL at 17:22

## 2022-12-21 RX ADMIN — SODIUM CHLORIDE 125 ML/HR: 9 INJECTION, SOLUTION INTRAVENOUS at 13:02

## 2022-12-21 RX ADMIN — FENTANYL CITRATE 25 MCG: 0.05 INJECTION, SOLUTION INTRAMUSCULAR; INTRAVENOUS at 12:24

## 2022-12-21 RX ADMIN — ROCURONIUM BROMIDE 30 MG: 10 SOLUTION INTRAVENOUS at 10:55

## 2022-12-21 RX ADMIN — CELECOXIB 200 MG: 200 CAPSULE ORAL at 09:26

## 2022-12-21 RX ADMIN — FENTANYL CITRATE 25 MCG: 0.05 INJECTION, SOLUTION INTRAMUSCULAR; INTRAVENOUS at 12:40

## 2022-12-21 RX ADMIN — Medication 80 MCG: at 10:37

## 2022-12-21 RX ADMIN — LIDOCAINE HYDROCHLORIDE 80 MG: 20 INJECTION, SOLUTION EPIDURAL; INFILTRATION; INTRACAUDAL; PERINEURAL at 10:32

## 2022-12-21 RX ADMIN — DEXAMETHASONE SODIUM PHOSPHATE 10 MG: 10 INJECTION, SOLUTION INTRAMUSCULAR; INTRAVENOUS at 10:40

## 2022-12-21 RX ADMIN — FENTANYL CITRATE 25 MCG: 0.05 INJECTION, SOLUTION INTRAMUSCULAR; INTRAVENOUS at 12:57

## 2022-12-21 RX ADMIN — ONDANSETRON 4 MG: 2 INJECTION INTRAMUSCULAR; INTRAVENOUS at 16:47

## 2022-12-21 RX ADMIN — MIDAZOLAM 2 MG: 1 INJECTION INTRAMUSCULAR; INTRAVENOUS at 10:21

## 2022-12-21 RX ADMIN — PHENYLEPHRINE HYDROCHLORIDE 40 MCG/MIN: 10 INJECTION INTRAVENOUS at 10:45

## 2022-12-21 RX ADMIN — SUCCINYLCHOLINE CHLORIDE 120 MG: 20 INJECTION, SOLUTION INTRAMUSCULAR; INTRAVENOUS at 10:32

## 2022-12-21 RX ADMIN — ACETAMINOPHEN 650 MG: 325 TABLET ORAL at 23:23

## 2022-12-21 RX ADMIN — ATORVASTATIN CALCIUM 80 MG: 40 TABLET, FILM COATED ORAL at 22:16

## 2022-12-21 RX ADMIN — FENTANYL CITRATE 25 MCG: 0.05 INJECTION, SOLUTION INTRAMUSCULAR; INTRAVENOUS at 12:52

## 2022-12-21 RX ADMIN — DOCUSATE SODIUM 50MG AND SENNOSIDES 8.6MG 1 TABLET: 8.6; 5 TABLET, FILM COATED ORAL at 17:22

## 2022-12-21 RX ADMIN — SUGAMMADEX 200 MG: 100 INJECTION, SOLUTION INTRAVENOUS at 12:08

## 2022-12-21 RX ADMIN — Medication 80 MCG: at 10:42

## 2022-12-21 RX ADMIN — HYDROMORPHONE HYDROCHLORIDE 1 MG: 2 INJECTION, SOLUTION INTRAMUSCULAR; INTRAVENOUS; SUBCUTANEOUS at 12:18

## 2022-12-21 RX ADMIN — ACETAMINOPHEN 1000 MG: 500 TABLET ORAL at 09:25

## 2022-12-21 RX ADMIN — ACETAMINOPHEN 650 MG: 325 TABLET ORAL at 17:22

## 2022-12-21 NOTE — H&P
Date of Surgery Update: Mike Odom was seen and examined. History and physical has been reviewed. The patient has been examined. There have been no significant clinical changes since the completion of the originally dated History and Physical.  Patient identified by surgeon; surgical site was confirmed by patient and surgeon.     Signed By: Rene Joiner MD     December 21, 2022 8:57 AM

## 2022-12-21 NOTE — OP NOTES
Name: Shala Trevino  MRN:  [id number]  : 1940  Age:  79 y.o. Surgery Date: 2022      OPERATIVE REPORT - LEFT TOTAL HIP ARTHROPLASTY -   ANTERIOR APPROACH    PREOPERATIVE DIAGNOSIS: Osteoarthritis, left hip. POSTOPERATIVE DIAGNOSIS: Osteoarthritis, left hip. PROCEDURE PERFORMED: Left total hip arthroplasty with Velys hip navigation    SURGEON: Eve Sever, MD    FIRST ASSISTANT:  Obdulio Ruiz PA-C    ANESTHESIA: Spinal with sedation. PRE-OP ANTIBIOTIC: Ancef 2g    COMPLICATIONS: None. ESTIMATED BLOOD LOSS: 250 mL. SPECIMENS REMOVED: None. COMPONENTS IMPLANTED:   Implant Name Type Inv. Item Serial No.  Lot No. LRB No. Used Action   SCREW BNE L25MM DIA6.5MM CANC HIP S STL GRIPTION FULL THRD - SNA  SCREW BNE L25MM DIA6.5MM CANC HIP S STL GRIPTION FULL THRD NA [facility] [employer] [facility] [id number] Left 1 Implanted   CUP ACET [id number] HIP GRIPTION NHUNG PINN - SNA  CUP ACET [id number] HIP GRIPTION NHUNG PINN NA [facility] [employer] [facility] [id number] Left 1 Implanted   SCREW BNE L35MM DIA6.5MM CANC HIP DOME PINN - SNA  SCREW BNE L35MM DIA6.5MM CANC HIP DOME PINN NA [facility] [employer] [facility] [id number] Left 1 Implanted   LINER ACET OD48MM [id number] NEUT OFFSET HIP ALTRX PINN - SNA  LINER ACET OD48MM [id number] NEUT OFFSET HIP ALTRX PINN NA [facility] [employer] [facility] [id number] Left 1 Implanted   STEM FEM SZ 1 STD OFFSET CLLRD HIP ARTC EZ 12/14 TAPR ACTIS - SNA  STEM FEM SZ 1 STD OFFSET CLLRD HIP ARTC EZ 12/14 TAPR ACTIS NA [facility] [employer] [facility] [id number] Left 1 Implanted   HEAD FEM [id number] +5MM OFFSET 12/14 TAPR HIP CERAMIC BIOLOX - SNA  HEAD FEM [id number] +5MM OFFSET 12/14 TAPR HIP CERAMIC BIOLOX NA [facility] [employer] [facility] [id number] Left 1 Implanted       INDICATIONS: The patient is an 79 yrs female with progressive debilitating left hip and groin pain due to severe osteoarthritis.  Symptoms have progressed despite comprehensive conservative treatment and they present for left total hip replacement. Risks include bleeding, infection, damage to the LFCN, leg length descrepency, blood clots, pulmonary embolism, and death. The patient understood these risks as well as potential benefits and alternatives and elected to proceed. DESCRIPTION OF PROCEDURE:  Anesthetic was initiated. Preoperative dose of intravenous antibiotic was given within 30 minutes of incision. One gram of tranexamic acid was also administered intravenously. 2 grams of tranexamic acid with 0.4mL of epi topically at the end of the case. The left side was confirmed during a timeout and the hip was prepped and draped in the usual sterile fashion. Skin was covered with Ioban occlusive dressing. A pre-operative xray was taken with the hip in 10 degrees of internal rotation which was utilized for the [address] The patient underwent straight catheterization at the end of the case. Direct anterior exposure was made to the patient's hip through the sartorius tensor interval well lateral of the ASIS to protect the LFCN. Anterior hip vasculature including the ascending branches of the lateral circumflex artery were cauterized. Retractors were taken out to observe for bleeding and there was none. A T capsulotomy was made with bovie electrocautery. The Charnley retractor was repositioned for exposure. The femoral neck was osteotomized. Femoral head was removed from the acetabulum, which was exposed and soft tissues were removed. The acetabulum was progressively  reamed  and a 48 mm trial shell was impacted with good press-fit. This was removed and a 48 mm [place] Gription shell was impacted in the acetabulum in 40 degrees of abduction in an anatomic-type anteversion. Bone spurs were removed and 6.5 screws x2 were placed. The polyethylene liner was placed. Femur was positioned and elevated from the wound.  Appropriate soft tissue releases were made including the posterior capsule and obturator internus. The medullary canal was entered with a box osteotome. The femoral canal was broached to a size 1. Calcar planed and then trialed. A 32 mm , +5 hip ball was the most appropriate for leg length and tension with offset to best match native offset. I then utilized the [facility] software to confirm offset and leg length changes compared to our pre-operative digital template and the intra-operative image that was obtained. The hip was dislocated. The trial was removed and the real stem was impacted. The real hip ball was placed. The hip was reduced. After copious irrigation, the capsule was closed with #1 Stratafix barbed sutures. I irrigated the skin, subcutaneous and deep wound. I closed the fascia of the tensor fascia janet with #1 stratafix barbed sutures. Skin and subcutaneous were irrigated. Soft tissues were infiltrated with local anesthetic. 2 grams of tranexamic acid with 0.4 mL of epi given topically in the wound. A pulse lavage was used to irrigate. Skin and subcutaneous were closed in a standard fashion with 2-0 vicryl and 3-0 monocryl followed by Dermabond. An aquacel dressing was applied. Veronica Walls was critical for key portions of the case including retractor management, wound closure, and dressing application. There was no one else available to perform these specific duties. There were no complications. No specimen was sent. The procedure was a LEFT TOTAL HIP REPLACEMENT using a Depuy total hip construct. The patient was transferred to the recovery room in stable condition. An AP pelvis xray was ordered to be completed in the PACU.      Laura Sorensen MD

## 2022-12-21 NOTE — PROGRESS NOTES
Problem: Mobility Impaired (Adult and Pediatric)  Goal: *Acute Goals and Plan of Care (Insert Text)  12/21/2022 1807 by Nirav Elam PT  Note: FUNCTIONAL STATUS PRIOR TO ADMISSION:  Per daughter who patient lives with, pt with significant decline over past 4 months - requiring use of RW secondary to left hip and right knee pain. Pt has been staying on 2nd floor (bedroom) except for leaving home for MD appts. Daughter reports pt would take excessive time to amb and perform stairs -- and sometimes would crawl up steps. HOME SUPPORT PRIOR TO ADMISSION: The patient lived with daughter who worked outside of home and required assist for shoes and socks and amb/stairs. Daughter reported she could complete basic ADL's bathing/dressing - however required excessive time and rests during activity. Daughter's plan to work out schedule for family member to be with patient at all times upon discharge to assist as needed. Family also to consider temporary bedroom on first floor. Physical Therapy Goals  Initiated 12/21/2022    1. Patient will move from supine to sit and sit to supine  and scoot up and down in bed with modified independence within 4 days. 2. Patient will perform sit to stand with modified independence within 4 days. 3. Patient will ambulate with minimal assistance/contact guard assist for > 75 feet with the least restrictive device within 4 days. 4. Patient will ascend/descend > 8 stairs with one handrail(s) with minimal assistance/contact guard assist within 4 days. 5. Patient will perform THR home exercise program per protocol with supervision/set-up within 4 days.      PHYSICAL THERAPY EVALUATION  Patient: Donato Nowak (29 y.o. female)  Date: 12/21/2022  Primary Diagnosis: Osteoarthritis of left hip, unspecified osteoarthritis type [M16.12]  Procedure(s) (LRB):  LEFT TOTAL HIP ARTHROPLASTY ANTERIOR APPROACH (Left) Day of Surgery   Precautions:   WBAT    ASSESSMENT  Based on the objective data described below, the patient presents POD# 0 Left THR with pain left hip, decreased AROM/strength and function left leg, decline in functional mobility, decreased activity tolerance and impaired standing balance/gait with RW.  Pt's also with history of declining functional mobility over past 4 months due to pain from multiple joint involvement (arthritis) - especially left hip and right knee (valgus deformity). Family state pt barely walked and rarely left bedroom on 2nd floor - stairs very difficult. Anticipate pt will require 3-4 additional PT sessions to increase amb distance, exercise/activity tolerance and stairs (4 WU home). Discussed with family concerns for discharge - pt will need assistance when primary caregiver - daughter who patient lives with - is working outside of home, and strongly encouraged a temporary bedroom on first level. Current Level of Function Impacting Discharge (mobility/balance): Mod assist supine to/from sit; Min assist x 2 for stand pivot transfer; min to mod assist x 1 amb at bedside     Functional Outcome Measure: The patient scored 55/100 on the Barthel Index outcome measure. Other factors to consider for discharge: Very supportive family, will need to consider bedroom 1st floor     Patient will benefit from skilled therapy intervention to address the above noted impairments. PLAN :  Recommendations and Planned Interventions: bed mobility training, transfer training, gait training, therapeutic exercises, patient and family training/education, and therapeutic activities      Frequency/Duration: Patient will be followed by physical therapy:  twice daily to address goals.     Recommendation for discharge: (in order for the patient to meet his/her long term goals)  Physical therapy at least 2 days/week in the home     This discharge recommendation:  Has been made in collaboration with the attending provider and/or case management    IF patient discharges home will need the following DME: patient owns DME required for discharge         SUBJECTIVE:   Patient stated my eyelids are so heavy .     OBJECTIVE DATA SUMMARY:   HISTORY:    Past Medical History:   Diagnosis Date    Arthritis     Asthma     CAD (coronary artery disease)     high cholestrol, PT DENIES    Chronic kidney disease     RIGHT KIDNEY STONE    Chronic pain     Diabetes ([facility] [address]     History of pancreatitis 2018    Hypertension     Mixed hyperlipidemia 10/28/2011    Thyroid disease     s/p partial thyroidectomy     Past Surgical History:   Procedure Laterality Date    HX GYN       x 2    HX OTHER SURGICAL      thyroidectomy - partial    MD EGD INTRMURAL NEEDLE ASPIR/BIOP ALTERED ANATOMY  10/31/2011    PT DENIES       Personal factors and/or comorbidities impacting plan of care: as above    Home Situation  Home Environment: Private residence  # Steps to Enter: 4  Rails to Enter: Yes  Hand Rails : Left  Wheelchair Ramp: No  One/Two Story Residence: Two story  # of Interior Steps: [address]: Both (1st 7 steps both, last 8 right side)  Lift Chair Available: No  Living Alone: No  Support Systems: Child(clair)  Patient Expects to be Discharged to[de-identified] Home  Current DME Used/Available at Home: Rochelle Marian, rolling, Rochelle Marian, rollator, [address] [facility], straight, Shower chair, Commode, bedside  Tub or Shower Type: Tub/Shower combination    EXAMINATION/PRESENTATION/DECISION MAKING:   Critical Behavior:  Neurologic State: Drowsy  Orientation Level: Oriented X4  Cognition: Unable to assess (comment) (secondary to drowsiness)  Safety/Judgement: Not assessed (due to drowsiness)  Hearing:   Auditory  Auditory Impairment: None  Hearing Aids/Status: Does not own  Skin:  Left hip incision covered with surgical dressing     Range Of Motion:  AROM: Generally decreased, functional (left leg and right knee)      Right knee valgus deformity noted                 Strength:    Strength: Generally decreased, functional Tone & Sensation:   Tone: Normal              Sensation: Intact               Coordination:  Coordination: Within functional limits  Functional Mobility:  Bed Mobility:     Supine to Sit: Moderate assistance;Assist x2; Additional time  Sit to Supine: Maximum assistance;Assist x2  Scooting: Maximum assistance;Assist x1  Transfers:  Sit to Stand: Moderate assistance;Assist x2; Additional time  Stand to Sit: Minimum assistance;Assist x1;Additional time  Stand Pivot Transfers: Minimum assistance;Assist x2; Additional time; Adaptive equipment                    Balance:   Sitting: Intact; Without support  Standing: Impaired; With support  Standing - Static: Fair;Constant support  Standing - Dynamic : Poor;Constant support  Ambulation/Gait Training:  Distance (ft): 4 Feet (ft) (at bedside)  Assistive Device: Walker, rolling;Gait belt  Ambulation - Level of Assistance: Minimal assistance; Moderate assistance;Assist x1;Adaptive equipment        Gait Abnormalities: Antalgic;Decreased step clearance;Shuffling gait (tends to step in place with little forward or backward movement)  Right Side Weight Bearing: Full  Left Side Weight Bearing: As tolerated  Base of Support: Center of gravity altered;Shift to right  Stance: Left decreased  Speed/Pat: Slow;Shuffled  Step Length: Right shortened;Left shortened  Swing Pattern: Left asymmetrical;Right asymmetrical     Therapeutic Exercises:   Pt instructed and performed ankle pumps and demonstrated proper use of incentive spirometer - instructed to perform 10 reps once hr when awake. Further exercise attempted but unsuccessful secondary to pt's drowsiness. Written instructions provided on pt's communication board. .    Functional Measure:  Barthel Index:    Bathin  Bladder: 10  Bowels: 10  Groomin  Dressin  Feeding: 10  Mobility: 0  Stairs: 0  Toilet Use: 5  Transfer (Bed to Chair and Back): 10  Total: 55/100       The Barthel ADL Index: Guidelines  1.  The index should be used as a record of what a patient does, not as a record of what a patient could do. 2. The main aim is to establish degree of independence from any help, physical or verbal, however minor and for whatever reason. 3. The need for supervision renders the patient not independent. 4. A patient's performance should be established using the best available evidence. Asking the patient, friends/relatives and nurses are the usual sources, but direct observation and common sense are also important. However direct testing is not needed. 5. Usually the patient's performance over the preceding 24-48 hours is important, but occasionally longer periods will be relevant. 6. Middle categories imply that the patient supplies over 50 per cent of the effort. 7. Use of aids to be independent is allowed. Score Interpretation (from [address] [address])    Independent   60-79 Minimally independent   40-59 Partially dependent   20-39 Very dependent   <20 Totally dependent     -Miki Venegas., BarthelJESSICAW. (1965). Functional evaluation: the Barthel Index. [address] [facility] ([address] (1997). The Barthel activities of daily living index: self-reporting versus actual performance in the old (> or = 75 years). Journal of [address] [place] [id number] 457), [facility], .MARY ELLENF, José Peñaloza., Fer Elizabeth. (1999). Measuring the change in disability after inpatient rehabilitation; comparison of the responsiveness of the Barthel Index and Functional Yates Measure. Journal of Neurology, Neurosurgery, and Psychiatry, 66(4), 218-683. Celio Judd N.BÁRBARA.AZUCENA, JESSICA Capps, & Krystle Rubio MFouziaA. (2004) Assessment of post-stroke quality of life in cost-effectiveness studies: The usefulness of the Barthel Index and the EuroQoL-5D.  Quality of Life Research, 15, 877-17           Physical Therapy Evaluation Charge Determination   History Examination Presentation Decision-Making LOW Complexity : Zero comorbidities / personal factors that will impact the outcome / POC LOW Complexity : 1-2 Standardized tests and measures addressing body structure, function, activity limitation and / or participation in recreation  LOW Complexity : Stable, uncomplicated  LOW Complexity : FOTO score of       Based on the above components, the patient evaluation is determined to be of the following complexity level: LOW     Pain Rating:  Pt c/o left hip pain especially with movement and right knee pain. Activity Tolerance:   Fair - limited by drowsiness and pre-surgery functional level     After treatment patient left in no apparent distress:   Supine in bed, Call bell within reach, Caregiver / family present, Side rails x 3, and ice applied to left hip    COMMUNICATION/EDUCATION:   The patients plan of care was discussed with: Registered nurse. Fall prevention education was provided and the patient/caregiver indicated understanding., Patient/family have participated as able in goal setting and plan of care. , and Patient/family agree to work toward stated goals and plan of care.     Thank you for this referral.  Carlos Caceres, PT   Time Calculation: 45 mins

## 2022-12-21 NOTE — PROGRESS NOTES
RENAL DOSE ADJUSTMENT MADE PER P/T PROTOCOL     PREVIOUS ORDER:  pepcid 20 mg po bid     Estimated Creatinine Clearance: 31.3 mL/min (A) (by C-G formula based on SCr of 1.22 mg/dL (H)). No results for input(s): BUN, PLT, INR, PLTEXT, INREXT in the last 72 hours.     No lab exists for component: CREATININE     NEW RENALLY ADJUSTED ORDER: pepcid 20 mg daily     SANJIV Fang  12/21/2022 3:04 PM

## 2022-12-21 NOTE — ANESTHESIA PREPROCEDURE EVALUATION
Relevant Problems   RESPIRATORY SYSTEM   (+) Asthma      CARDIOVASCULAR   (+) Essential (primary) hypertension   (+) Hypertension      RENAL FAILURE   (+) Stage 3 chronic kidney disease (HCC)      ENDOCRINE   (+) Acquired hypothyroidism   (+) DM (diabetes mellitus) (HCC)   (+) Diabetes mellitus (HCC)   (+) Severe obesity (HCC)   (+) Type 2 diabetes mellitus with nephropathy (HCC)   (+) Type 2 diabetes mellitus without complication (HCC)       Anesthetic History   No history of anesthetic complications            Review of Systems / Medical History  Patient summary reviewed, nursing notes reviewed and pertinent labs reviewed    Pulmonary  Within defined limits          Asthma        Neuro/Psych   Within defined limits           Cardiovascular  Within defined limits  Hypertension          CAD         GI/Hepatic/Renal  Within defined limits              Endo/Other  Within defined limits           Other Findings              Physical Exam    Airway  Mallampati: II  TM Distance: > 6 cm  Neck ROM: normal range of motion   Mouth opening: Normal     Cardiovascular  Regular rate and rhythm,  S1 and S2 normal,  no murmur, click, rub, or gallop             Dental  No notable dental hx       Pulmonary  Breath sounds clear to auscultation               Abdominal  GI exam deferred       Other Findings            Anesthetic Plan    ASA: 3  Anesthesia type: spinal            Anesthetic plan and risks discussed with: Patient

## 2022-12-21 NOTE — PERIOP NOTES
[phone]: TRANSFER - OUT REPORT:    Verbal report given to [facility] RN(name) on Macario Lofton  being transferred to (unit) for routine post - op       Report consisted of patients Situation, Background, Assessment and   Recommendations(SBAR). Time Pre op antibiotic given:1037  Anesthesia Stop time: 5032    Information from the following report(s) SBAR, Kardex, Procedure Summary, Intake/Output, MAR, and Recent Results was reviewed with the receiving nurse. Opportunity for questions and clarification was provided. Is the patient on 02? YES       L/Min 3       Other none    Is the patient on a monitor? NO    Is the nurse transporting with the patient? NO    Surgical Waiting Area notified of patient's transfer from PACU? YES      The following personal items collected during your admission accompanied patient upon transfer:   Dental Appliance: Dental Appliances: None  Vision: Visual Aid: Glasses (with her niece Chloé Cummings)  Hearing Aid:    Jewelry: Jewelry: None  Clothing: Clothing: With patient (returned to pt in PACU)  Other Valuables:  Other Valuables: None  Valuables sent to safe:

## 2022-12-21 NOTE — PROGRESS NOTES
Ortho NP Note    POD# 0  s/p LEFT TOTAL HIP ARTHROPLASTY ANTERIOR APPROACH     Pt resting in bed with family at bedside. .  Awakened to verbal stimuli. No complaints at present, continues to close eyes. Patient denies pain. Family unsure if she has used oxycodone/tramadol in past, typically uses tylenol. Aware of pending therapy evaluation later today and agreeable if patient more awake. Patient has not had something to eat/drink. No nausea/vomiting. VSS Afebrile. Visit Vitals  BP (!) 137/51   Pulse 73   Temp 97.8 °F (36.6 °C)   Resp 10   Ht 4' 11\" (1.499 m)   Wt 68.6 kg (151 lb 3.8 oz)   SpO2 98%   BMI 30.55 kg/m²       Most Recent Labs:   Lab Results   Component Value Date/Time    HGB 14.0 11/16/2022 10:06 AM    Hemoglobin A1c 7.2 (H) 12/14/2022 09:01 AM       Body mass index is 30.55 kg/m². Reference: BMI greater than 30 is classified as obesity and greater than 40 is classified as morbid obesity. STOP BANG Score: 3    Voiding status: pending void    Aquacel to left hip c.d.i. Cryotherapy in place over incision. Bilateral LEs warm, dry. 1+ DP pulses  Sensation and motor intact. DF/PF/EHL 4/5. SCDs for mechanical DVT proph    Plan:  1) PT: starting today, WBAT  2) Sivan-op Antibiotics Ancef  3) Pain:  Received Tylenol, Celebrex, Lyrica in preop. Perioperative anesthesia: Spinal.  Post operative analgesia includes scheduled tylenol and PRN tramadol, oxycodone, IV dilaudid. 4) DVT Prophylaxis:  Eliquis 2.5 mg PO BID (ASA allergy). Encouraged early mobilization, bed exercises, and SCD use. 5) GI Prophylaxis: Pepcid   6) T2DM: Glipizide + sliding scale lispro. Resume home regimen at discharge. 7) HTN: Current BP: 137/51. Resume home amlodipine; resume HCTZ, valsartan per protocol if SBP > 125.    8) Discharge plans: to home with family support. DME: has walker.   Rx: [facility]    Eva Nick NP

## 2022-12-22 ENCOUNTER — HOME HEALTH ADMISSION (OUTPATIENT)
Dept: HOME HEALTH SERVICES | Facility: HOME HEALTH | Age: 82
End: 2022-12-22
Payer: COMMERCIAL

## 2022-12-22 VITALS
OXYGEN SATURATION: 94 % | WEIGHT: 151.24 LBS | TEMPERATURE: 97.6 F | RESPIRATION RATE: 16 BRPM | BODY MASS INDEX: 30.49 KG/M2 | HEIGHT: 59 IN | DIASTOLIC BLOOD PRESSURE: 71 MMHG | SYSTOLIC BLOOD PRESSURE: 157 MMHG | HEART RATE: 84 BPM

## 2022-12-22 LAB
ANION GAP SERPL CALC-SCNC: 8 MMOL/L (ref 5–15)
BUN SERPL-MCNC: 30 MG/DL (ref 6–20)
BUN/CREAT SERPL: 24 (ref 12–20)
CALCIUM SERPL-MCNC: 9.2 MG/DL (ref 8.5–10.1)
CHLORIDE SERPL-SCNC: 102 MMOL/L (ref 97–108)
CO2 SERPL-SCNC: 24 MMOL/L (ref 21–32)
CREAT SERPL-MCNC: 1.25 MG/DL (ref 0.55–1.02)
GLUCOSE BLD STRIP.AUTO-MCNC: 131 MG/DL (ref 65–117)
GLUCOSE BLD STRIP.AUTO-MCNC: 157 MG/DL (ref 65–117)
GLUCOSE SERPL-MCNC: 161 MG/DL (ref 65–100)
HGB BLD-MCNC: 12.7 G/DL (ref 11.5–16)
POTASSIUM SERPL-SCNC: 3.6 MMOL/L (ref 3.5–5.1)
SERVICE CMNT-IMP: ABNORMAL
SERVICE CMNT-IMP: ABNORMAL
SODIUM SERPL-SCNC: 134 MMOL/L (ref 136–145)

## 2022-12-22 PROCEDURE — 82962 GLUCOSE BLOOD TEST: CPT

## 2022-12-22 PROCEDURE — 74011000250 HC RX REV CODE- 250: Performed by: PHYSICIAN ASSISTANT

## 2022-12-22 PROCEDURE — 97116 GAIT TRAINING THERAPY: CPT

## 2022-12-22 PROCEDURE — 74011636637 HC RX REV CODE- 636/637: Performed by: PHYSICIAN ASSISTANT

## 2022-12-22 PROCEDURE — 97165 OT EVAL LOW COMPLEX 30 MIN: CPT

## 2022-12-22 PROCEDURE — 74011250637 HC RX REV CODE- 250/637: Performed by: PHYSICIAN ASSISTANT

## 2022-12-22 PROCEDURE — 85018 HEMOGLOBIN: CPT

## 2022-12-22 PROCEDURE — 97535 SELF CARE MNGMENT TRAINING: CPT

## 2022-12-22 PROCEDURE — 80048 BASIC METABOLIC PNL TOTAL CA: CPT

## 2022-12-22 PROCEDURE — 97530 THERAPEUTIC ACTIVITIES: CPT

## 2022-12-22 PROCEDURE — 36415 COLL VENOUS BLD VENIPUNCTURE: CPT

## 2022-12-22 PROCEDURE — 77030041034 HC KT HIP PATT -B

## 2022-12-22 PROCEDURE — 74011250636 HC RX REV CODE- 250/636: Performed by: PHYSICIAN ASSISTANT

## 2022-12-22 RX ORDER — NALOXONE HYDROCHLORIDE 4 MG/.1ML
SPRAY NASAL
Qty: 1 EACH | Refills: 0 | Status: SHIPPED | OUTPATIENT
Start: 2022-12-22

## 2022-12-22 RX ORDER — TRAMADOL HYDROCHLORIDE 50 MG/1
50 TABLET ORAL
Qty: 42 TABLET | Refills: 0 | Status: SHIPPED | OUTPATIENT
Start: 2022-12-22 | End: 2023-01-06

## 2022-12-22 RX ORDER — OXYCODONE HYDROCHLORIDE 5 MG/1
5 TABLET ORAL
Qty: 20 TABLET | Refills: 0 | Status: SHIPPED | OUTPATIENT
Start: 2022-12-22 | End: 2023-01-01

## 2022-12-22 RX ADMIN — VALSARTAN 320 MG: 160 TABLET, FILM COATED ORAL at 08:22

## 2022-12-22 RX ADMIN — APIXABAN 2.5 MG: 2.5 TABLET, FILM COATED ORAL at 08:23

## 2022-12-22 RX ADMIN — GLIPIZIDE 5 MG: 5 TABLET ORAL at 07:08

## 2022-12-22 RX ADMIN — Medication 2 UNITS: at 06:28

## 2022-12-22 RX ADMIN — AMLODIPINE BESYLATE 10 MG: 5 TABLET ORAL at 08:23

## 2022-12-22 RX ADMIN — TRAMADOL HYDROCHLORIDE 50 MG: 50 TABLET, FILM COATED ORAL at 03:52

## 2022-12-22 RX ADMIN — SODIUM CHLORIDE 125 ML/HR: 9 INJECTION, SOLUTION INTRAVENOUS at 03:43

## 2022-12-22 RX ADMIN — LEVOTHYROXINE SODIUM 75 MCG: 0.07 TABLET ORAL at 06:26

## 2022-12-22 RX ADMIN — CEFAZOLIN 2 G: 1 INJECTION, POWDER, FOR SOLUTION INTRAMUSCULAR; INTRAVENOUS at 01:20

## 2022-12-22 RX ADMIN — ACETAMINOPHEN 650 MG: 325 TABLET ORAL at 06:26

## 2022-12-22 RX ADMIN — DOCUSATE SODIUM 50MG AND SENNOSIDES 8.6MG 1 TABLET: 8.6; 5 TABLET, FILM COATED ORAL at 08:21

## 2022-12-22 RX ADMIN — SODIUM CHLORIDE, PRESERVATIVE FREE 10 ML: 5 INJECTION INTRAVENOUS at 07:08

## 2022-12-22 RX ADMIN — HYDROCHLOROTHIAZIDE 12.5 MG: 25 TABLET ORAL at 08:21

## 2022-12-22 NOTE — PROGRESS NOTES
Patient assessed for readiness to ambulate. Vital Signs  Level of Consciousness: Alert (0)  Temp: 97.6 °F (36.4 °C)  Temp Source: Oral  Pulse (Heart Rate): 84  Heart Rate Source: Monitor  Resp Rate: 16  BP: (!) 157/71  MAP (Calculated): 100  BP 1 Location: Left upper arm  BP 1 Method: Automatic  BP Patient Position: At rest  MEWS Score: 1. Patient ambulated with assistance of 1 nurses. Patient ambulated with gait belt and walker. Patient walked to Bedside commode. Patient returned safely to bed.

## 2022-12-22 NOTE — DISCHARGE SUMMARY
Ortho Discharge Summary    Patient ID:  Shala [facility]  [id number]  female  79 y.o.  1940    Admit date: 12/21/2022    Discharge date: 12/22/2022    Admitting Physician: Ale Swift MD     Consulting Physician(s):   Treatment Team: Attending Provider: Marcella Brown MD; Physical Therapist: Franny Iyer, PT; Occupational Therapist: Anita Garcia OT; Staff Nurse: Joe Pedroza RN; Care Manager: Digna Roberts RN    Date of Surgery:   12/21/2022     Preoperative Diagnosis:  OSTEOARTHRITIS LEFT HIP    Postoperative Diagnosis:   OSTEOARTHRITIS LEFT HIP    Procedure(s):   LEFT TOTAL HIP ARTHROPLASTY ANTERIOR APPROACH     Anesthesia Type:   Spinal     Surgeon: Marcella Brown MD                            HPI:  Pt is a 79 y.o. female who has a history of OSTEOARTHRITIS LEFT HIP  with pain and limitations of activities of daily living who presents at this time for a left LEFT TOTAL HIP [address] following the failure of conservative management. PMH:   Past Medical History:   Diagnosis Date    Arthritis     Asthma     CAD (coronary artery disease)     high cholestrol, PT DENIES    Chronic kidney disease     RIGHT KIDNEY STONE    Chronic pain     Diabetes ([address]     History of pancreatitis 01/26/2018    Hypertension     Mixed hyperlipidemia 10/28/2011    Thyroid disease     s/p partial thyroidectomy       Body mass index is 30.55 kg/m². : A BMI > 30 is classified as obesity and > 40 is classified as morbid obesity. Medications upon admission :   Prior to Admission Medications   Prescriptions Last Dose Informant Patient Reported? Taking? MULTIVITAMIN W-MINERALS/LUTEIN (CENTRUM SILVER PO) 12/14/2022  Yes Yes   Sig: Take  by mouth. Takes one po daily    acetaminophen (TYLENOL) 650 mg TbER 12/20/2022 at 1630  Yes Yes   Sig: Take 650 mg by mouth every eight (8) hours.    albuterol (PROVENTIL HFA, VENTOLIN HFA, PROAIR HFA) 90 mcg/actuation inhaler 12/20/2022  No Yes Sig: Take 1 Puff by inhalation every four (4) hours as needed for Wheezing. amLODIPine (NORVASC) 5 mg tablet 12/21/2022  No Yes   Sig: Take 2 Tablets by mouth daily. Indications: high blood pressure   atorvastatin (LIPITOR) 80 mg tablet 12/20/2022  No Yes   Sig: Take 1 Tablet by mouth daily. 1 po daily   Patient taking differently: Take 80 mg by mouth nightly. 1 po daily   glipiZIDE (GLUCOTROL) 5 mg tablet 12/20/2022  No Yes   Sig: TAKE ONE TABLET BY MOUTH EVERY MORNING BEFORE BREAKFAST IF BLOOD SUGAR MEASURES 140 OR HIGHER   hydroCHLOROthiazide (HYDRODIURIL) 12.5 mg tablet 12/20/2022  No Yes   Sig: Take 1 Tablet by mouth daily. ketoconazole (NIZORAL) 2 % topical cream 12/21/2022  Yes Yes   Sig: Apply  to affected area daily. APPLY TO SOLES OF FEET DAILY   levothyroxine (SYNTHROID) 75 mcg tablet 12/21/2022  No Yes   Sig: Take 1 Tablet by mouth Daily (before breakfast). loratadine (CLARITIN) 10 mg tablet 12/20/2022  No Yes   Sig: Take 1 Tab by mouth daily. For phlegm in throat   valsartan (DIOVAN) 320 mg tablet 12/20/2022  No Yes   Sig: Take 1 Tablet by mouth daily. Facility-Administered Medications: None        Allergies: Allergies   Allergen Reactions    Aspirin Other (comments)     Pt states she is asthmatic and \"knows not to take ASA\"    Aspirin Unknown (comments)     Unsure of reaction    Codeine Itching     Also nausea, diarrhea, dizziness      Iodine Hives    Shellfish Containing Products Other (comments)     Asthma attack    [place] Course: The patient underwent surgery. Complications:  None; patient tolerated the procedure well. Was taken to the PACU in stable condition and then transferred to the ortho floor. Mobilized on day of surgery. Overnight pain control using tylenol, tramadol. On POD 1, patient evaluated by PT and OT with clearance for return to home with family support.         Perioperative Antibiotics:  Ancef     Postoperative Pain Management: Oxycodone & Tylenol, Tramadol    DVT Prophylaxis: Eliquis 2.5 mg PO BID    Postoperative transfusions:    Number of units banked PRBCs =   none     Post Op complications: none    Hemoglobin at discharge:    Lab Results   Component Value Date/Time    HGB 12.7 12/22/2022 04:10 AM    INR 1.0 12/12/2022 11:45 AM       Aquacel dressing remained in place- clean, dry and intact. No significant erythema or swelling. Wound appears to be healing without any evidence of infection. Neurovascular exam found to be within normal limits. Physical Therapy started following surgery and participated in bed mobility, transfers and ambulation. Gait:  Gait  Base of Support: Center of gravity altered, Shift to right  Speed/Pat: Slow, Shuffled  Step Length: Right shortened, Left shortened  Swing Pattern: Left asymmetrical, Right asymmetrical  Stance: Left decreased  Gait Abnormalities: Antalgic, Decreased step clearance, Shuffling gait (tends to step in place with little forward or backward movement)  Ambulation - Level of Assistance: Minimal assistance, Moderate assistance, Assist x1, Adaptive equipment  Distance (ft): 4 Feet (ft) (at bedside)  Assistive Device: Walker, rolling, Gait belt                   Discharged to: Home. Condition on Discharge:   stable    Discharge instructions:  - Anticoagulate with Eliquis 2.5 mg PO BID  - Take pain medications as prescribed  - Resume pre hospital diet      - Discharge activity: activity as tolerated  - Ambulate with assistive device as needed. - Weight bearing status as tolerated  - Wound Care Keep wound clean and dry. See discharge instruction sheet. -DISCHARGE MEDICATION LIST     Current Discharge Medication List        START taking these medications    Details   oxyCODONE IR (ROXICODONE) 5 mg immediate release tablet Take 1 Tablet by mouth every four (4) hours as needed for Pain for up to 10 days.  Max Daily Amount: 30 mg.  Qty: 20 Tablet, Refills: 0  Start date: 12/22/2022, End date: 1/1/2023    Comments: Dr. Tanya Garcia [id number]  Associated Diagnoses: Osteoarthritis of left hip, unspecified osteoarthritis type      traMADoL (ULTRAM) 50 mg tablet Take 1 Tablet by mouth every six (6) hours as needed for Pain for up to 15 days. Max Daily Amount: 200 mg. Qty: 42 Tablet, Refills: 0  Start date: 12/22/2022, End date: 1/6/2023    Comments: Dr. Tanya Garcia [id number]  Associated Diagnoses: Osteoarthritis of left hip, unspecified osteoarthritis type      naloxone (NARCAN) 4 mg/actuation nasal spray Use 1 spray intranasally, then discard. Repeat with new spray every 2 min as needed for opioid overdose symptoms, alternating nostrils. Qty: 1 Each, Refills: 0  Start date: 12/22/2022      apixaban (ELIQUIS) 2.5 mg tablet Take 1 Tablet by mouth two (2) times a day for 30 days. Indications: deep vein thrombosis prevention  Qty: 60 Tablet, Refills: 0  Start date: 12/22/2022, End date: 1/21/2023           CONTINUE these medications which have NOT CHANGED    Details   acetaminophen (TYLENOL) 650 mg TbER Take 650 mg by mouth every eight (8) hours. ketoconazole (NIZORAL) 2 % topical cream Apply  to affected area daily. APPLY TO SOLES OF FEET DAILY      hydroCHLOROthiazide (HYDRODIURIL) 12.5 mg tablet Take 1 Tablet by mouth daily. Qty: 90 Tablet, Refills: 3    Associated Diagnoses: Hypertension, unspecified type      valsartan (DIOVAN) 320 mg tablet Take 1 Tablet by mouth daily. Qty: 180 Tablet, Refills: 3    Associated Diagnoses: Hypertension, unspecified type      levothyroxine (SYNTHROID) 75 mcg tablet Take 1 Tablet by mouth Daily (before breakfast).   Qty: 90 Tablet, Refills: 3    Associated Diagnoses: Hypothyroidism      glipiZIDE (GLUCOTROL) 5 mg tablet TAKE ONE TABLET BY MOUTH EVERY MORNING BEFORE BREAKFAST IF BLOOD SUGAR MEASURES 140 OR HIGHER  Qty: 90 Tablet, Refills: 0    Associated Diagnoses: Type 2 diabetes mellitus without complication, without long-term current use of insulin (HCC)      atorvastatin (LIPITOR) 80 mg tablet Take 1 Tablet by mouth daily. 1 po daily  Qty: 180 Tablet, Refills: 1    Associated Diagnoses: Mixed hyperlipidemia      amLODIPine (NORVASC) 5 mg tablet Take 2 Tablets by mouth daily. Indications: high blood pressure  Qty: 180 Tablet, Refills: 1    Associated Diagnoses: Essential hypertension      albuterol (PROVENTIL HFA, VENTOLIN HFA, PROAIR HFA) 90 mcg/actuation inhaler Take 1 Puff by inhalation every four (4) hours as needed for Wheezing. Qty: 1 Each, Refills: 5    Associated Diagnoses: Mild intermittent asthma without complication      loratadine (CLARITIN) 10 mg tablet Take 1 Tab by mouth daily. For phlegm in throat  Qty: 90 Tab, Refills: 0    Associated Diagnoses: Phlegm in throat      MULTIVITAMIN W-MINERALS/LUTEIN (CENTRUM SILVER PO) Take  by mouth.  Takes one po daily           per medical continuation form      -Follow up in office in 3-4 weeks with Dr. Gretta Sorto      Signed:  Luis Manuel Duarte NP  Orthopaedic Nurse Practitioner    12/22/2022  11:28 AM

## 2022-12-22 NOTE — PROGRESS NOTES
Ortho NP Note    POD# 1  s/p LEFT TOTAL HIP ARTHROPLASTY ANTERIOR APPROACH   Pt seen with daughter at bedside. Pt resting in chair. Completed PT/OT evaluation. Patient states she feels in excellent health and feels ready to return to home with family support--has multiple family members available to care for her. Patient reports mild but tolerable pain to left hip, pain controlled using primarily tramadol. Ate breakfast, no N/V. No CP, no SOB. No dizziness, no lightheadedness. VSS Afebrile. Visit Vitals  BP (!) 157/71 (BP 1 Location: Left upper arm)   Pulse 84   Temp 97.6 °F (36.4 °C)   Resp 16   Ht 4' 11\" (1.499 m)   Wt 68.6 kg (151 lb 3.8 oz)   SpO2 94%   BMI 30.55 kg/m²       Voiding status: spontaneous void   Output (mL)  Urine Voided: 250 ml (12/22/22 0021)  Last Bowel Movement Date: 12/21/22 (12/21/22 0933)  Unmeasurable Output  Urine Occurrence(s): 1 (12/22/22 0628)      Labs    Lab Results   Component Value Date/Time    HGB 12.7 12/22/2022 04:10 AM      Lab Results   Component Value Date/Time    INR 1.0 12/12/2022 11:45 AM      Lab Results   Component Value Date/Time    Sodium 134 (L) 12/22/2022 04:10 AM    Potassium 3.6 12/22/2022 04:10 AM    Chloride 102 12/22/2022 04:10 AM    CO2 24 12/22/2022 04:10 AM    Glucose 161 (H) 12/22/2022 04:10 AM    BUN 30 (H) 12/22/2022 04:10 AM    Creatinine 1.25 (H) 12/22/2022 04:10 AM    Calcium 9.2 12/22/2022 04:10 AM     Recent Glucose Results:   Lab Results   Component Value Date/Time     (H) 12/22/2022 04:10 AM    GLUCPOC 131 (H) 12/22/2022 11:20 AM    GLUCPOC 157 (H) 12/22/2022 06:19 AM    GLUCPOC 138 (H) 12/21/2022 10:11 PM           Aquacel dressing to left hip c.d.i  Cryotherapy in place over incision  Calves soft and supple; No pain with passive stretch  Bilateral LEs warm, dry. 2+ DP pulses.     Sensation and motor intact - PF/DF/EHL intact 5/5  SCDs for mechanical DVT proph while in bed     ASSESSMENT/PLAN: Patient seen following therapy clearance. I have reviewed progress note and am in agreement with assessment and plan as documented by Davis GILMAN. In preparation for discharged, reviewed the following in room with patient. 1) PT: BID WBAT in hospital.  Therapy to be continued at home with HH--CM involved to arrange however due to self pay status/pending Medicaid patient may not be able to receive services unless approved by [address] [address] for [facility] care. MD team aware, okay to discharge without [facility]. 2) Anticoagulation: Eliquis 2.5 mg PO BID for DVT Prophylaxis. Encouraged ongoing mobilization, bed exercises. 3) Pain - Multimodal approach including cryotherapy, scheduled tylenol with PRN tramadol, oxycodone, IV dilaudid while in hospital.  To be discharged with tramadol, oxycodone rx--all medications at [address] [place] [address] per patient request.  Discussed precautions for narcotic use: avoid driving, ETOH, other sedating medications. 4) Post op care: Continue bowel regimen, encouraged IS. Follow up in 3-4 weeks with Dr Royal Gomes. Aquacel to remain in place x 7 days unless integrity is lost.   5) T2DM: Glipizide + sliding scale lispro. Resume home regimen at discharge. 6) HTN: Current BP: 137/51. Home amlodipine, HCTZ, valsartan. 7) Readiness for discharge:      [x] Vital Signs stable    [x] + Voiding    [x] Wound intact, drainage minimal    [x] Tolerating PO intake     [x] Cleared by PT (OT if applicable)     [] Stair training completed (if applicable)    [] Independent / Contact Guard Assist (household distance)     [] Bed mobility     [] Car transfers     [] ADLs    [x] Adequate pain control on oral medication alone     Discharge to home with family support today.     Jose Villafuerte NP  Available via [employer]

## 2022-12-22 NOTE — PROGRESS NOTES
Bedside and Verbal shift change report given to Arelis Vincent RN (oncoming nurse) by Jd Koehler RN (offgoing nurse). Report included the following information SBAR, Kardex, Intake/Output, MAR, and Recent Results. Pt mobilized with PT within 4 hours of arriving to the unit.

## 2022-12-22 NOTE — PROGRESS NOTES
FUNCTIONAL STATUS PRIOR TO ADMISSION:  Per daughter who patient lives with, pt with significant decline over past 4 months - requiring use of RW secondary to left hip and right knee pain. Pt has been staying on 2nd floor (bedroom) except for leaving home for MD appts. Daughter reports pt would take excessive time to amb and perform stairs -- and sometimes would crawl up steps. HOME SUPPORT PRIOR TO ADMISSION: The patient lived with daughter who worked outside of home and required assist for shoes and socks and amb/stairs. Daughter reported she could complete basic ADL's bathing/dressing - however required excessive time and rests during activity. Daughter's plan to work out schedule for family member to be with patient at all times upon discharge to assist as needed. Family also to consider temporary bedroom on first floor. Physical Therapy Goals  Initiated 12/21/2022     1. Patient will move from supine to sit and sit to supine  and scoot up and down in bed with modified independence within 4 days. 2. Patient will perform sit to stand with modified independence within 4 days. 3. Patient will ambulate with minimal assistance/contact guard assist for > 75 feet with the least restrictive device within 4 days. 4. Patient will ascend/descend > 8 stairs with one handrail(s) with minimal assistance/contact guard assist within 4 days. 5. Patient will perform THR home exercise program per protocol with supervision/set-up within 4 days. PHYSICAL THERAPY TREATMENT  Patient: Brenda Sy (2 y.o. female)  Date: 12/22/2022  Diagnosis: Osteoarthritis of left hip, unspecified osteoarthritis type [M16.12] <principal problem not specified>  Procedure(s) (LRB):  LEFT TOTAL HIP ARTHROPLASTY ANTERIOR APPROACH (Left) 1 Day Post-Op  Precautions: Fall, WBAT  Chart, physical therapy assessment, plan of care and goals were reviewed.     ASSESSMENT  Patient continues with skilled PT services and is progressing towards goals. Pt seen to progress ambulation including stairs and complete discharge instructions - daughter and grandson present and participated practicing guarding with gait belt and assisting with stairs. Reviewed and instructed in proper positioning to avoid external rotation in supine , sitting or recliner position - using blanket roll to support leg in neutral position. Instructed pt in proper use of ice and elevation to decrease pain and swelling and progressive walking program as tolerated. Pt/family able to verbalize understanding of discharge education. Pt cleared for discharge from PT standpoint. Pt/family viewed discharge video. RN, Case Management and NP - Leo Rudd notified. .     Current Level of Function Impacting Discharge (mobility/balance): Supervision for transfers/amb with RW; CGA/min assist for stairs - pt using both arms on left rail    Other factors to consider for discharge: Family to temporarily move bedroom to first floor; family will have someone stay with pt with primary caregiver at work - initially          PLAN :  Patient continues to benefit from skilled intervention to address the above impairments. Continue treatment per established plan of care. to address goals. Recommendation for discharge: (in order for the patient to meet his/her long term goals)  Physical therapy at least 2 days/week in the home     This discharge recommendation:  Has been made in collaboration with the attending provider and/or case management    IF patient discharges home will need the following DME: patient owns DME required for discharge       SUBJECTIVE:   Patient stated The pain is not too bad.     OBJECTIVE DATA SUMMARY:   Critical Behavior:  Neurologic State: Alert  Orientation Level: Oriented X4  Cognition: Appropriate decision making, Appropriate for age attention/concentration, Appropriate safety awareness  Safety/Judgement: Awareness of environment  Functional Mobility Training:  Bed Mobility:     Supine to Sit:  (received in the chair)  Sit to Supine:  (remained in the chair)           Transfers:  Sit to Stand: Supervision  Stand to Sit: Supervision        Bed to Chair: Supervision                    Balance:  Sitting: Intact  Standing: Impaired; With support  Standing - Static: Good;Constant support  Standing - Dynamic : Good;Constant support  Ambulation/Gait Training:  Distance (ft): 200 Feet (ft)  Assistive Device: Walker, rolling;Gait belt  Ambulation - Level of Assistance: Supervision        Gait Abnormalities: Decreased step clearance  Right Side Weight Bearing: Full  Left Side Weight Bearing: As tolerated  Base of Support: Center of gravity altered;Shift to right  Stance: Left decreased  Speed/Pat: Slow  Step Length: Right shortened;Left shortened  Swing Pattern: Left asymmetrical     Stairs:  Number of Stairs Trained: 4 (x 2; daughter practiced up/down steps with pt)  Stairs - Level of Assistance: Stand-by assistance;Minimum assistance; Additional time (verbal cues)   Rail Use: Left  (used both hands on left rail)    Pain Rating:  Left hip 2-3/10    Activity Tolerance:   Good - POD# 0 - cleared for discharge    After treatment patient left in no apparent distress:   Sitting in chair, Call bell within reach, Caregiver / family present, and ice applied to left hip    COMMUNICATION/COLLABORATION:   The patients plan of care was discussed with: Registered nurse, Case management, and NP - Norma Dias, PT   Time Calculation: 35 mins

## 2022-12-22 NOTE — PROGRESS NOTES
Nurse Cyndee Francisco [facility] by SBAR and Kardex

## 2022-12-22 NOTE — PROGRESS NOTES
Hospital follow-up PCP transitional care [place] [address] for Thursday, December 29th, 2022at 1:15 p.m. Please provide photo ID, proof of income - recent eight weeks of [de-identified], or statement of income from employer, listing of all medications and discharge papers. Pending patient discharge.  Tracey Guadarrama, Care Management Assistant

## 2022-12-22 NOTE — PROGRESS NOTES
Physical Therapy 12/22/2022    Pt seen to progress ambulation including stairs and complete discharge instructions - daughter and grandson present and participated practicing guarding with gait belt and assisting with stairs. Reviewed and instructed in proper positioning to avoid external rotation in supine , sitting or recliner position - using blanket roll to support leg in neutral position. Instructed pt in proper use of ice and elevation to decrease pain and swelling and progressive walking program as tolerated. Pt/family able to verbalize understanding of discharge education. Pt cleared for discharge from PT standpoint. Pt/family viewed discharge video. RN, Case Management and NP - Leo Rudd notified.     Full note to follow -   Kena Case, PT

## 2022-12-22 NOTE — PROGRESS NOTES
Resting comfortably. GEN:  NAD.  AOx3   ABD:  S/NT/ND   LLE:  Dressing C/D/I , 5/5 motor, Calf nttp (Bilat), Sensation grossly intact to light touch throughout, 1+ dp/pt pulses, foot perfused    Patient Vitals for the past 24 hrs:   Temp Pulse Resp BP SpO2   12/22/22 0705 97.6 °F (36.4 °C) 84 16 (!) 157/71 94 %   12/21/22 2318 98.6 °F (37 °C) 83 16 109/60 95 %   12/21/22 1924 97.5 °F (36.4 °C) 81 14 137/78 94 %   12/21/22 1808 96.8 °F (36 °C) 80 14 139/73 100 %   12/21/22 1719 (!) 96.6 °F (35.9 °C) 76 14 (!) 149/75 95 %   12/21/22 1607 (!) 96.6 °F (35.9 °C) 77 12 134/78 100 %   12/21/22 1510 97.5 °F (36.4 °C) 76 12 125/85 100 %   12/21/22 1345 -- 73 10 (!) 137/51 98 %   12/21/22 1330 97.8 °F (36.6 °C) 78 10 (!) 124/55 98 %   12/21/22 1315 -- 70 11 (!) 94/58 94 %   12/21/22 1300 -- 81 10 131/65 (!) 83 %   12/21/22 1245 -- 74 13 117/64 99 %   12/21/22 1235 -- 72 12 (!) 120/56 99 %   12/21/22 1230 -- 70 10 (!) 126/56 98 %   12/21/22 1225 -- 70 15 (!) 131/56 91 %   12/21/22 1222 97.9 °F (36.6 °C) 72 20 (!) 146/64 99 %   12/21/22 0854 97.9 °F (36.6 °C) 88 16 (!) 161/73 100 %       Current Facility-Administered Medications   Medication Dose Route Frequency    amLODIPine (NORVASC) tablet 10 mg  10 mg Oral DAILY    atorvastatin (LIPITOR) tablet 80 mg  80 mg Oral QHS    glipiZIDE (GLUCOTROL) tablet 5 mg  5 mg Oral ACB    hydroCHLOROthiazide (HYDRODIURIL) tablet 12.5 mg  12.5 mg Oral DAILY    levothyroxine (SYNTHROID) tablet 75 mcg  75 mcg Oral ACB    valsartan (DIOVAN) tablet 320 mg  320 mg Oral DAILY    0.9% sodium chloride infusion  125 mL/hr IntraVENous CONTINUOUS    sodium chloride 0.9 % bolus infusion 500 mL  500 mL IntraVENous ONCE PRN    sodium chloride (NS) flush 5-40 mL  5-40 mL IntraVENous Q8H    sodium chloride (NS) flush 5-40 mL  5-40 mL IntraVENous PRN    acetaminophen (TYLENOL) tablet 650 mg  650 mg Oral Q6H    oxyCODONE IR (ROXICODONE) tablet 5 mg  5 mg Oral Q3H PRN    HYDROmorphone (DILAUDID) injection 0.5 mg  0.5 mg IntraVENous Q4H PRN    naloxone (NARCAN) injection 0.4 mg  0.4 mg IntraVENous PRN    ondansetron (ZOFRAN) injection 4 mg  4 mg IntraVENous Q4H PRN    hydrOXYzine HCL (ATARAX) tablet 10 mg  10 mg Oral Q8H PRN    famotidine (PEPCID) tablet 20 mg  20 mg Oral DAILY    senna-docusate (PERICOLACE) 8.6-50 mg per tablet 1 Tablet  1 Tablet Oral BID    polyethylene glycol (MIRALAX) packet 17 g  17 g Oral DAILY    bisacodyL (DULCOLAX) suppository 10 mg  10 mg Rectal DAILY PRN    apixaban (ELIQUIS) tablet 2.5 mg  2.5 mg Oral BID    insulin lispro (HUMALOG) injection   SubCUTAneous TIDAC    glucose chewable tablet 16 g  4 Tablet Oral PRN    glucagon (GLUCAGEN) injection 1 mg  1 mg IntraMUSCular PRN    dextrose 10 % infusion 0-250 mL  0-250 mL IntraVENous PRN    traMADoL (ULTRAM) tablet 50 mg  50 mg Oral Q6H PRN       Lab Results   Component Value Date/Time    HGB 12.7 12/22/2022 04:10 AM    INR 1.0 12/12/2022 11:45 AM       Lab Results   Component Value Date/Time    Sodium 134 (L) 12/22/2022 04:10 AM    Potassium 3.6 12/22/2022 04:10 AM    Chloride 102 12/22/2022 04:10 AM    CO2 24 12/22/2022 04:10 AM    BUN 30 (H) 12/22/2022 04:10 AM    Creatinine 1.25 (H) 12/22/2022 04:10 AM    Calcium 9.2 12/22/2022 04:10 AM    Magnesium 1.5 (L) 10/29/2011 06:45 AM        79 y.o. female s/p left direct anterior total hip arthroplasty on 12/21/2022  . Doing well.      Precautions: None  ABX: Complete 24 hours perioperative abx  PATHWAY: Straight cath per protocol  DVT Prophylaxis: Eliquis 2.5 mg BID  Weight Bearing: WBAT LLE   Pain Control: Tylenol, tramadol every 6 hours, oxy 5 mg for breakthrough pain  Disposition: Home, HHPT if covered by insurance

## 2022-12-22 NOTE — PROGRESS NOTES
Problem: Self Care Deficits Care Plan (Adult)  Goal: *Acute Goals and Plan of Care (Insert Text)  Description: Occupational Therapy Goals  Initiated: 12/22/2022   1. Patient will perform grooming with supervision/set-up standing at sink within 3 day(s). 2.  Patient will perform bathing with supervision/set-up from chair within 3 day(s). 3.  Patient will perform upper body dressing and lower body dressing with supervision/set-up within 3 day(s). 4.  Patient will perform toilet transfers with supervision/set-up within 3 day(s). 5.  Patient will perform all aspects of toileting with supervision/set-up within 3 day(s). 6.  Patient will be independent with hip precautions within 3 days. FUNCTIONAL STATUS PRIOR TO ADMISSION: Pt lives with her family. Family is willing and able to assist her as needed. She was doing household ambulation for the last few months only and reports difficuty with walking due to pain. HOME SUPPORT: Pt lives with family. She was assisted with ADL activities as needed at home due to difficulty with LE weakness and limited ROM in LUE. Outcome: Progressing Towards Goal     OCCUPATIONAL THERAPY EVALUATION  Patient: [place] Perea (25 y.o. female)  Date: 12/22/2022  Primary Diagnosis: Osteoarthritis of left hip, unspecified osteoarthritis type [M16.12]  Procedure(s) (LRB):  LEFT TOTAL HIP ARTHROPLASTY ANTERIOR APPROACH (Left) 1 Day Post-Op   Precautions: Avoid sudden and extreme movements of the hip  Fall, WBAT    ASSESSMENT  Based on the objective data described below, the patient presents with decreased independence with self care and functional mobility following admission for L THR. She has significant weakness due to decreased mobility at baseline secondary to pain in her left hip and right knee. She has impaired ROM in LUE due to OA. This date, she engaged with AE from the chair level and completed LB dressing activities with min A.   She does report this is significantly better than prior to surgery with use of AE. Her daughter engaged with education and training for LB ADL activities for home. She reports understanding of all education provided. Reviewed tub bench transfers for home. She reports understanding of training and education to access lower body. Anticipate she will require physical assistance with ADL activities but family was assisting her prior. She may benefit from [facility] to address bathroom safety and but may have limitations with obtaining HHOT at discharge. Discussed with NP following intervention. Pt is cleared for discharge by OT at this time with family support and assistance with ADl activities. Should she remain in the acute care setting, OT will continue education and training for basic ADL activities for home until discharged. Current Level of Function Impacting Discharge (ADLs/self-care): min A for LB dressing, CG for sit to stand    Functional Outcome Measure: The patient scored Total: 65/100 on the Barthel Index outcome measure which is indicative of being 35% impairment in basic self-care. Other factors to consider for discharge: pain, debility, OA in shoulder and knee     Patient will benefit from skilled therapy intervention to address the above noted impairments. PLAN :  Recommendations and Planned Interventions: self care training, functional mobility training, therapeutic exercise, balance training, therapeutic activities, endurance activities, patient education, home safety training, and family training/education    Frequency/Duration: Patient will be followed by occupational therapy 5 times a week to address goals.     Recommendation for discharge: (in order for the patient to meet his/her long term goals)  Occupational therapy at least 2 days/week in the home  but understand this may need to be prioritized for HHPT services (pt is self pay)    This discharge recommendation:  Has been made in collaboration with the attending provider and/or case management    IF patient discharges home will need the following DME: Issued a hip kit this morning       SUBJECTIVE:   Patient stated I am feeling better.     OBJECTIVE DATA SUMMARY:   HISTORY:   Past Medical History:   Diagnosis Date    Arthritis     Asthma     CAD (coronary artery disease)     high cholestrol, PT DENIES    Chronic kidney disease     RIGHT KIDNEY STONE    Chronic pain     Diabetes ([facility] [address]     History of pancreatitis 2018    Hypertension     Mixed hyperlipidemia 10/28/2011    Thyroid disease     s/p partial thyroidectomy     Past Surgical History:   Procedure Laterality Date    HX GYN       x 2    HX OTHER SURGICAL      thyroidectomy - partial    CT EGD INTRMURAL NEEDLE ASPIR/BIOP ALTERED ANATOMY  10/31/2011    PT DENIES       Expanded or extensive additional review of patient history:     Home Situation  Home Environment: Private residence  # Steps to Enter: 4  Rails to Enter: Yes  Hand Rails : Left  Wheelchair Ramp: No  One/Two Story Residence: Two story  # of Interior Steps: [address]: Both  Lift Chair Available: No  Living Alone: No  Support Systems: Shelter  Patient Expects to be Discharged to[de-identified] Home  Current DME Used/Available at Home: Walker, rollator, Walker, rolling, Raised toilet seat, Commode, bedside  Tub or Shower Type: Tub/Shower combination    Hand dominance: Right    EXAMINATION OF PERFORMANCE DEFICITS:  Cognitive/Behavioral Status:  Neurologic State: Alert  Orientation Level: Oriented X4  Cognition: Appropriate decision making; Appropriate for age attention/concentration; Appropriate safety awareness  Perception: Appears intact  Perseveration: No perseveration noted  Safety/Judgement: Awareness of environment    Skin: see nursing notes    Edema: none noted    Hearing:   Auditory  Auditory Impairment: None  Hearing Aids/Status: Does not own    Vision/Perceptual:                           Acuity: Within Defined Limits         Range of Motion:    AROM: Generally decreased, functional  PROM: Generally decreased, functional                      Strength:    Strength: Generally decreased, functional                Coordination:  Coordination: Generally decreased, functional  Fine Motor Skills-Upper: Left Intact; Right Intact    Gross Motor Skills-Upper: Left Intact; Right Intact    Tone & SensationTone: Normal  Sensation: Intact                      Balance:  Sitting: Intact  Standing: Impaired; With support  Standing - Static: Fair;Constant support  Standing - Dynamic : Poor;Constant support    Functional Mobility and Transfers for ADLs:  Bed Mobility:  Supine to Sit:  (recieved in the chair)  Sit to Supine:  (remained in the chair)    Transfers:  Sit to Stand: Contact guard assistance  Stand to Sit: Contact guard assistance  Bed to Chair: Contact guard assistance  Bathroom Mobility:  (BSC use only)  Toilet Transfer : Contact guard assistance    ADL Assessment:  Feeding: Independent    Oral Facial Hygiene/Grooming: Setup    Bathing: Additional time; Moderate assistance    Type of Bath: Chlorhexidine (CHG)    Upper Body Dressing: Setup    Lower Body Dressing: Minimum assistance    Toileting: Contact guard assistance                ADL Intervention and task modifications:     Lower Body Access:  Pt with anterior hip replacement and instructed to avoid extreme movements and allow pain to be the guide to complete lower body access. Recommend use of hip kit to complete lower body dressing to maximize independence and assist with pain control. Pt provided with education for proper  to access lower body with trunk flexion. Pt instructed with trunk flexion, both elbows and hands should surrounding knees with hips neutral.  Pt reports independence with training and education. Pt completed lower body dressing with min A for pants, min A for underpants, socks with min A , and mod A for shoes.      Tub/Shower transfers using Tub Transfer Bench:   Discussed technique for transferring into a tub/shower combos using a tub shower bench. Provided written instructions and visual education for proper technique. Recommend this type of transfer due to safety concerns with stepping over a deep bathtub. Pt educated to use a gait belt to assist with lift LE over the tub side as needed. Patient verbalized understanding of education provided. Type of Bath: Chlorhexidine (CHG)                        Cognitive Retraining  Safety/Judgement: Awareness of environment      Functional Measure:    Barthel Index:  Bathin  Bladder: 10  Bowels: 10  Groomin  Dressin  Feeding: 10  Mobility: 10  Stairs: 0  Toilet Use: 5  Transfer (Bed to Chair and Back): 10  Total: 65/100      The Barthel ADL Index: Guidelines  1. The index should be used as a record of what a patient does, not as a record of what a patient could do. 2. The main aim is to establish degree of independence from any help, physical or verbal, however minor and for whatever reason. 3. The need for supervision renders the patient not independent. 4. A patient's performance should be established using the best available evidence. Asking the patient, friends/relatives and nurses are the usual sources, but direct observation and common sense are also important. However direct testing is not needed. 5. Usually the patient's performance over the preceding 24-48 hours is important, but occasionally longer periods will be relevant. 6. Middle categories imply that the patient supplies over 50 per cent of the effort. 7. Use of aids to be independent is allowed. Score Interpretation (from [address] [address])    Independent   60-79 Minimally independent   40-59 Partially dependent   20-39 Very dependent   <20 Totally dependent     -Miki Venegas., Barthel, D.W. (1965). Functional evaluation: the Barthel Index. [address] [facility] ([address] (1997).  The Barthel activities of daily living index: self-reporting versus actual performance in the old (> or = 75 years). Journal of [address] [place] [address] 457), [address] [facility], KATHERINEF, Alisson Fuentes.[place] (1999). Measuring the change in disability after inpatient rehabilitation; comparison of the responsiveness of the Barthel Index and Functional [place] Measure. Journal of Neurology, Neurosurgery, and Psychiatry, 66(4), 169-700. ERI Bocanegra, JESSICA Capps, & Reginaldo Lizama M.A. (2004) Assessment of post-stroke quality of life in cost-effectiveness studies: The usefulness of the Barthel Index and the EuroQoL-5D. Quality of Life Research, 15, 119-75     Occupational Therapy Evaluation Charge Determination   History Examination Decision-Making   LOW Complexity : Brief history review  HIGH Complexity : 5 or more performance deficits relating to physical, cognitive , or psychosocial skils that result in activity limitations and / or participation restrictions HIGH Complexity : Patient presents with comorbidities that affect occupational performance. Signifigant modification of tasks or assistance (eg, physical or verbal) with assessment (s) is necessary to enable patient to complete evaluation       Based on the above components, the patient evaluation is determined to be of the following complexity level: LOW   Pain Rating:  3/10 pain in her left hip    Activity Tolerance:   Good    After treatment patient left in no apparent distress:    Sitting in chair and Call bell within reach    COMMUNICATION/EDUCATION:   The patients plan of care was discussed with: Physical therapist and Registered nurse. Home safety education was provided and the patient/caregiver indicated understanding. and Patient/family have participated as able in goal setting and plan of care. This patients plan of care is appropriate for delegation to [facility].     Thank you for this referral.  Cynthia Francisco Theodor Antis, OT  Time Calculation: 50 mins

## 2022-12-22 NOTE — PROGRESS NOTES
Transition Of Care: The patient plans to discharge home with family to transport. The patient does not have insurance and medicaid pending. The patient has a lot of family assistance and doesn't need any dme. She has a RW, cane, bedside commode and raised toilet seat. RUR: N/A    The patient has no insurance and has applied for medicaid through [employer]. The patient has the care card with [facility]. The patient is established at the [facility] and a follow up visit is being arranged. Free month supply Elaquis card will be used for her prescription. CM sent referral to [facility] to see if they could see the patient for a few home health visits. CM following for discharge needs.     Lady Eden RN/CRM

## 2022-12-23 ENCOUNTER — HOME CARE VISIT (OUTPATIENT)
Dept: SCHEDULING | Facility: HOME HEALTH | Age: 82
End: 2022-12-23
Payer: COMMERCIAL

## 2022-12-23 VITALS
SYSTOLIC BLOOD PRESSURE: 134 MMHG | OXYGEN SATURATION: 100 % | RESPIRATION RATE: 16 BRPM | DIASTOLIC BLOOD PRESSURE: 70 MMHG | TEMPERATURE: 97 F | HEART RATE: 80 BPM

## 2022-12-23 PROCEDURE — G0151 HHCP-SERV OF PT,EA 15 MIN: HCPCS

## 2022-12-28 ENCOUNTER — HOME CARE VISIT (OUTPATIENT)
Dept: SCHEDULING | Facility: HOME HEALTH | Age: 82
End: 2022-12-28
Payer: COMMERCIAL

## 2022-12-28 PROCEDURE — G0151 HHCP-SERV OF PT,EA 15 MIN: HCPCS

## 2022-12-29 VITALS
HEART RATE: 90 BPM | RESPIRATION RATE: 16 BRPM | TEMPERATURE: 97.6 F | OXYGEN SATURATION: 97 % | SYSTOLIC BLOOD PRESSURE: 142 MMHG | DIASTOLIC BLOOD PRESSURE: 68 MMHG

## 2022-12-30 ENCOUNTER — HOME CARE VISIT (OUTPATIENT)
Dept: HOME HEALTH SERVICES | Facility: HOME HEALTH | Age: 82
End: 2022-12-30
Payer: COMMERCIAL

## 2022-12-30 ENCOUNTER — HOME CARE VISIT (OUTPATIENT)
Dept: SCHEDULING | Facility: HOME HEALTH | Age: 82
End: 2022-12-30
Payer: COMMERCIAL

## 2022-12-30 PROCEDURE — G0151 HHCP-SERV OF PT,EA 15 MIN: HCPCS

## 2022-12-31 VITALS
OXYGEN SATURATION: 99 % | RESPIRATION RATE: 16 BRPM | DIASTOLIC BLOOD PRESSURE: 60 MMHG | TEMPERATURE: 98.1 F | HEART RATE: 88 BPM | SYSTOLIC BLOOD PRESSURE: 128 MMHG

## 2023-01-03 ENCOUNTER — HOME CARE VISIT (OUTPATIENT)
Dept: SCHEDULING | Facility: HOME HEALTH | Age: 83
End: 2023-01-03
Payer: COMMERCIAL

## 2023-01-03 PROCEDURE — G0151 HHCP-SERV OF PT,EA 15 MIN: HCPCS

## 2023-01-04 VITALS
DIASTOLIC BLOOD PRESSURE: 75 MMHG | HEART RATE: 92 BPM | RESPIRATION RATE: 16 BRPM | OXYGEN SATURATION: 97 % | SYSTOLIC BLOOD PRESSURE: 139 MMHG | TEMPERATURE: 97.7 F

## 2023-01-05 ENCOUNTER — HOME CARE VISIT (OUTPATIENT)
Dept: SCHEDULING | Facility: HOME HEALTH | Age: 83
End: 2023-01-05
Payer: COMMERCIAL

## 2023-01-05 PROCEDURE — G0151 HHCP-SERV OF PT,EA 15 MIN: HCPCS

## 2023-01-06 VITALS
DIASTOLIC BLOOD PRESSURE: 72 MMHG | RESPIRATION RATE: 16 BRPM | TEMPERATURE: 97.6 F | SYSTOLIC BLOOD PRESSURE: 136 MMHG | HEART RATE: 90 BPM | OXYGEN SATURATION: 98 %

## 2023-01-10 ENCOUNTER — HOME CARE VISIT (OUTPATIENT)
Dept: SCHEDULING | Facility: HOME HEALTH | Age: 83
End: 2023-01-10
Payer: COMMERCIAL

## 2023-01-10 PROCEDURE — G0151 HHCP-SERV OF PT,EA 15 MIN: HCPCS

## 2023-01-12 VITALS
HEART RATE: 87 BPM | TEMPERATURE: 97.8 F | RESPIRATION RATE: 16 BRPM | DIASTOLIC BLOOD PRESSURE: 75 MMHG | OXYGEN SATURATION: 98 % | SYSTOLIC BLOOD PRESSURE: 135 MMHG

## 2023-01-17 ENCOUNTER — OFFICE VISIT (OUTPATIENT)
Dept: ORTHOPEDIC SURGERY | Age: 83
End: 2023-01-17
Payer: COMMERCIAL

## 2023-01-17 VITALS — BODY MASS INDEX: 30.44 KG/M2 | HEIGHT: 59 IN | WEIGHT: 151 LBS

## 2023-01-17 DIAGNOSIS — Z96.642 STATUS POST LEFT HIP REPLACEMENT: Primary | ICD-10-CM

## 2023-01-17 PROCEDURE — 99024 POSTOP FOLLOW-UP VISIT: CPT | Performed by: ORTHOPAEDIC SURGERY

## 2023-01-17 RX ORDER — CEPHALEXIN 500 MG/1
500 CAPSULE ORAL 2 TIMES DAILY
Qty: 10 CAPSULE | Refills: 0 | Status: SHIPPED | OUTPATIENT
Start: 2023-01-17

## 2023-01-31 NOTE — PROGRESS NOTES
Eboni Antunez (: 1940) is a 79 y.o. female patient, here for evaluation of the following chief complaint(s):  Surgical Follow-up (Left hip follow up/)       ASSESSMENT/PLAN:  Below is the assessment and plan developed based on review of pertinent history, physical exam, labs, studies, and medications. Radiographs reviewed including 2 views of the left hip. Status post hip arthroplasty. No evidence of aseptic loosening. Leg lengths and offset are appropriate. Status post left hip arthroplasty on 2022. The patient is doing well and they are happy with progress. Comes in today for an incision check. She has a small superficial dehiscence of her incision. No overt signs of infection. We will plan to do wet-to-dry dressing changes daily. Prophylactic antibiotic called into pharmacy. Risks and benefits of medication discussed with patient. Patient verbalized understanding. She denies any pain. No fevers chills. We will follow-up in 2 weeks for repeat incision check or sooner as needed. 1. Status post left hip replacement  -     XR HIP LT W OR WO PELV 2-3 VWS; Future  -     cephALEXin (KEFLEX) 500 mg capsule; Take 1 Capsule by mouth two (2) times a day., Normal, Disp-10 Capsule, R-0      Encounter Diagnosis   Name Primary? Status post left hip replacement Yes        No follow-ups on file. SUBJECTIVE/OBJECTIVE:  Eboni Antunez (: 1940) is a 79 y.o. female who presents today for the following:  Chief Complaint   Patient presents with    Surgical Follow-up     Left hip follow up         Status post left hip arthroplasty on 2022. The patient is doing well and they are happy with progress. Comes in today for an incision check. She has a small superficial dehiscence of her incision. No overt signs of infection.       IMAGING:  XR Results (most recent):  Results from Appointment encounter on 23    XR HIP LT W OR WO PELV 2-3 VWS    Narrative  Radiographs reviewed including 2 views of the left hip. Status post hip arthroplasty. No evidence of aseptic loosening. Leg lengths and offset are appropriate. Allergies   Allergen Reactions    Aspirin Other (comments)     Pt states she is asthmatic and \"knows not to take ASA\"    Aspirin Unknown (comments)     Unsure of reaction    Codeine Itching     Also nausea, diarrhea, dizziness      Iodine Hives    Shellfish Containing Products Other (comments)     Asthma attack    Shellfish Derived Hives       Current Outpatient Medications   Medication Sig    cephALEXin (KEFLEX) 500 mg capsule Take 1 Capsule by mouth two (2) times a day. acetaminophen (TYLENOL) 650 mg TbER Take 650 mg by mouth every eight (8) hours. for moderate pain    ketoconazole (NIZORAL) 2 % topical cream Apply 1 Each to affected area daily. APPLY TO SOLES OF FEET DAILY    hydroCHLOROthiazide (HYDRODIURIL) 12.5 mg tablet Take 1 Tablet by mouth daily. valsartan (DIOVAN) 320 mg tablet Take 1 Tablet by mouth daily. levothyroxine (SYNTHROID) 75 mcg tablet Take 1 Tablet by mouth Daily (before breakfast). glipiZIDE (GLUCOTROL) 5 mg tablet TAKE ONE TABLET BY MOUTH EVERY MORNING BEFORE BREAKFAST IF BLOOD SUGAR MEASURES 140 OR HIGHER (Patient taking differently: Take 5 mg by mouth daily as needed for PRN Reason (Other) (elevated BG). TAKE ONE TABLET BY MOUTH EVERY MORNING BEFORE BREAKFAST IF BLOOD SUGAR MEASURES 140 OR HIGHER)    atorvastatin (LIPITOR) 80 mg tablet Take 1 Tablet by mouth daily. 1 po daily (Patient taking differently: Take 80 mg by mouth nightly. 1 po daily)    amLODIPine (NORVASC) 5 mg tablet Take 2 Tablets by mouth daily. Indications: high blood pressure    albuterol (PROVENTIL HFA, VENTOLIN HFA, PROAIR HFA) 90 mcg/actuation inhaler Take 1 Puff by inhalation every four (4) hours as needed for Wheezing.    loratadine (CLARITIN) 10 mg tablet Take 1 Tab by mouth daily.  For phlegm in throat    MULTIVITAMIN W-MINERALS/LUTEIN (CENTRUM SILVER PO) Take 1 Tablet by mouth daily. Takes one po daily      No current facility-administered medications for this visit. Past Medical History:   Diagnosis Date    Arthritis     Asthma     CAD (coronary artery disease)     high cholestrol, PT DENIES    Chronic kidney disease     RIGHT KIDNEY STONE    Chronic pain     Diabetes ([address]     History of pancreatitis 2018    Hypertension     Mixed hyperlipidemia 10/28/2011    Thyroid disease     s/p partial thyroidectomy        Past Surgical History:   Procedure Laterality Date    HX GYN       x 2    HX OTHER SURGICAL      thyroidectomy - partial    [facility] EGD INTRMURAL NEEDLE ASPIR/BIOP ALTERED ANATOMY  10/31/2011    PT DENIES       Family History   Problem Relation Age of Onset    Hypertension Father     Anesth Problems Neg Hx         Social History     Tobacco Use    Smoking status: Never    Smokeless tobacco: Never   Substance Use Topics    Alcohol use: Yes     Alcohol/week: 1.0 standard drink     Types: 1 Glasses of wine per week     Comment: 1 glass of wine on Sundays        All systems reviewed x 12 and were negative with the exception of None      No flowsheet data found. Vitals:  Ht 4' 11\" (1.499 m)   Wt 151 lb (68.5 kg)   BMI 30.50 kg/m²    Body mass index is 30.5 kg/m². Physical Exam    General: NAD    Cardiac: Extremities well perfused. Respiratory: Nonlabored breathing. LLE: Incision clean dry and intact with no erythema. Small superficial dehiscence noted. No signs of infection. Minimal numbness over the LFCN. Normal gait and station. Negative stinchfield. No pain with gentle internal and external rotation. .  Motor strength is grossly intact. Skin warm well perfused. Capillary refill <2 sec. Kasandra Madrid M.D. was available for immediate consultation as the supervising physician. An electronic signature was used to authenticate this note.   -- Melissa Kwong PA-C

## 2023-02-10 ENCOUNTER — OFFICE VISIT (OUTPATIENT)
Dept: ORTHOPEDIC SURGERY | Age: 83
End: 2023-02-10
Payer: COMMERCIAL

## 2023-02-10 VITALS — WEIGHT: 151 LBS | HEIGHT: 59 IN | BODY MASS INDEX: 30.44 KG/M2

## 2023-02-10 DIAGNOSIS — Z98.890 STATUS POST HIP SURGERY: ICD-10-CM

## 2023-02-10 DIAGNOSIS — M17.11 ARTHRITIS OF RIGHT KNEE: Primary | ICD-10-CM

## 2023-02-10 PROCEDURE — 1123F ACP DISCUSS/DSCN MKR DOCD: CPT | Performed by: PHYSICIAN ASSISTANT

## 2023-02-10 PROCEDURE — 20610 DRAIN/INJ JOINT/BURSA W/O US: CPT | Performed by: PHYSICIAN ASSISTANT

## 2023-02-10 PROCEDURE — 99213 OFFICE O/P EST LOW 20 MIN: CPT | Performed by: PHYSICIAN ASSISTANT

## 2023-02-13 ENCOUNTER — TELEPHONE (OUTPATIENT)
Dept: FAMILY MEDICINE CLINIC | Age: 83
End: 2023-02-13

## 2023-02-16 ENCOUNTER — TELEPHONE (OUTPATIENT)
Dept: FAMILY MEDICINE CLINIC | Age: 83
End: 2023-02-16

## 2023-02-16 NOTE — TELEPHONE ENCOUNTER
T/c from daughter. Per daughter, patient has been approved for medicaid.  AN referral will be closed and a message will be sent to provider and Nasrin Sánchez.

## 2023-02-28 RX ORDER — METHYLPREDNISOLONE ACETATE 40 MG/ML
40 INJECTION, SUSPENSION INTRA-ARTICULAR; INTRALESIONAL; INTRAMUSCULAR; SOFT TISSUE ONCE
Status: COMPLETED | OUTPATIENT
Start: 2023-02-28 | End: 2023-02-28

## 2023-02-28 RX ADMIN — METHYLPREDNISOLONE ACETATE 40 MG: 40 INJECTION, SUSPENSION INTRA-ARTICULAR; INTRALESIONAL; INTRAMUSCULAR; SOFT TISSUE at 10:25

## 2023-02-28 NOTE — PROGRESS NOTES
Will Lowe (: 1940) is a 79 y.o. female patient, here for evaluation of the following chief complaint(s):  Surgical Follow-up (Left hip follow up/)       ASSESSMENT/PLAN:  Below is the assessment and plan developed based on review of pertinent history, physical exam, labs, studies, and medications. 79-year-old female comes in today for 2 reasons. She status post a left total hip replacement 10 weeks ago. Postoperatively she is doing fantastic. Incision is well-healed. She has no pain secondary to this. She is very happy and pleased with her progress thus far. In regards to her hip plans to see her back in 9 months for standard postop check or sooner as needed. Main complaint today is her right knee. She has known severe tricompartmental bone-on-bone knee osteoarthritic changes. Discussed treatment options with patient. She would like to try a steroid injection. After verbal consent was obtained I injected  3mL Lidocaine and 40 mg DepoMedrol into the right knee joint using sterile technique. Patient tolerated well. Encouraged activity modification, physical therapy ice rest as needed. She is currently working on obtaining insurance through [employer] and plans to follow-up once this is obtained. 1. Arthritis of right knee  -     DRAIN/INJECT LARGE JOINT/BURSA  -     methylPREDNISolone acetate (DEPO-MEDROL) 40 mg/mL injection 40 mg; 40 mg, Intra artICUlar, ONCE, 1 dose, On 23 at 1100  2. Status post hip surgery      Encounter Diagnoses   Name Primary? Status post hip surgery     Arthritis of right knee Yes        No follow-ups on file. SUBJECTIVE/OBJECTIVE:  Will Lowe (: 1940) is a 79 y.o. female who presents today for the following:  Chief Complaint   Patient presents with    Surgical Follow-up     Left hip follow up         79-year-old female comes in today for 2 reasons. She status post a left total hip replacement 10 weeks ago. Postoperatively she is doing fantastic. Incision is well-healed. She has no pain secondary to this. She is very happy and pleased with her progress thus far. Main complaint today is her right knee. She has known severe tricompartmental bone-on-bone knee osteoarthritic changes. She can walk no distance without pain. Rates it as severe to extreme. IMAGING:  XR Results (most recent):  Results from Appointment encounter on 01/17/23    XR HIP LT W OR WO PELV 2-3 VWS    Narrative  Radiographs reviewed including 2 views of the left hip. Status post hip arthroplasty. No evidence of aseptic loosening. Leg lengths and offset are appropriate. Allergies   Allergen Reactions    Aspirin Other (comments)     Pt states she is asthmatic and \"knows not to take ASA\"    Aspirin Unknown (comments)     Unsure of reaction    Codeine Itching     Also nausea, diarrhea, dizziness      Iodine Hives    Shellfish Containing Products Other (comments)     Asthma attack    Shellfish Derived Hives       Current Outpatient Medications   Medication Sig    cephALEXin (KEFLEX) 500 mg capsule Take 1 Capsule by mouth two (2) times a day. acetaminophen (TYLENOL) 650 mg TbER Take 650 mg by mouth every eight (8) hours. for moderate pain    ketoconazole (NIZORAL) 2 % topical cream Apply 1 Each to affected area daily. APPLY TO SOLES OF FEET DAILY    hydroCHLOROthiazide (HYDRODIURIL) 12.5 mg tablet Take 1 Tablet by mouth daily. valsartan (DIOVAN) 320 mg tablet Take 1 Tablet by mouth daily. levothyroxine (SYNTHROID) 75 mcg tablet Take 1 Tablet by mouth Daily (before breakfast). glipiZIDE (GLUCOTROL) 5 mg tablet TAKE ONE TABLET BY MOUTH EVERY MORNING BEFORE BREAKFAST IF BLOOD SUGAR MEASURES 140 OR HIGHER (Patient taking differently: Take 5 mg by mouth daily as needed for PRN Reason (Other) (elevated BG).  TAKE ONE TABLET BY MOUTH EVERY MORNING BEFORE BREAKFAST IF BLOOD SUGAR MEASURES 140 OR HIGHER)    atorvastatin (LIPITOR) 80 mg tablet Take 1 Tablet by mouth daily. 1 po daily (Patient taking differently: Take 80 mg by mouth nightly. 1 po daily)    amLODIPine (NORVASC) 5 mg tablet Take 2 Tablets by mouth daily. Indications: high blood pressure    albuterol (PROVENTIL HFA, VENTOLIN HFA, PROAIR HFA) 90 mcg/actuation inhaler Take 1 Puff by inhalation every four (4) hours as needed for Wheezing.    loratadine (CLARITIN) 10 mg tablet Take 1 Tab by mouth daily. For phlegm in throat    MULTIVITAMIN W-MINERALS/LUTEIN (CENTRUM SILVER PO) Take 1 Tablet by mouth daily. Takes one po daily      Current Facility-Administered Medications   Medication    methylPREDNISolone acetate (DEPO-MEDROL) 40 mg/mL injection 40 mg       Past Medical History:   Diagnosis Date    Arthritis     Asthma     CAD (coronary artery disease)     high cholestrol, PT DENIES    Chronic kidney disease     RIGHT KIDNEY STONE    Chronic pain     Diabetes ([facility] [address]     History of pancreatitis 2018    Hypertension     Mixed hyperlipidemia 10/28/2011    Thyroid disease     s/p partial thyroidectomy        Past Surgical History:   Procedure Laterality Date    HX GYN       x 2    HX OTHER SURGICAL      thyroidectomy - partial    AK EGD INTRMURAL NEEDLE ASPIR/BIOP ALTERED ANATOMY  10/31/2011    PT DENIES       Family History   Problem Relation Age of Onset    Hypertension Father     Anesth Problems Neg Hx         Social History     Tobacco Use    Smoking status: Never    Smokeless tobacco: Never   Substance Use Topics    Alcohol use: Yes     Alcohol/week: 1.0 standard drink     Types: 1 Glasses of wine per week     Comment: 1 glass of wine on Sundays        All systems reviewed x 12 and were negative with the exception of None      No flowsheet data found. Vitals:  Ht 4' 11\" (1.499 m)   Wt 151 lb (68.5 kg)   BMI 30.50 kg/m²    Body mass index is 30.5 kg/m². Physical Exam    General: NAD    Cardiac: Extremities well perfused. Respiratory: Nonlabored breathing. LLE: Incision clean dry and intact with no erythema. Minimal numbness over the LFCN. Normal gait and station. Negative stinchfield. No pain with gentle internal and external rotation. .  Motor strength is grossly intact. Skin warm well perfused. Capillary refill <2 sec. Dai Aponte M.D. was available for immediate consultation as the supervising physician. An electronic signature was used to authenticate this note.   -- Carolann Beltran PA-C

## 2023-06-09 ENCOUNTER — TELEPHONE (OUTPATIENT)
Age: 83
End: 2023-06-09

## 2023-06-09 NOTE — TELEPHONE ENCOUNTER
Patient's daughter called main office requesting appt. Unable to schedule appt because she has Medicaid. I explained process and she understood. Patient will establish care with Dr Pb Mustafa.   Thanks   [employer]

## 2023-07-06 ENCOUNTER — HOSPITAL ENCOUNTER (OUTPATIENT)
Facility: HOSPITAL | Age: 83
Discharge: HOME | End: 2023-07-06
Payer: MEDICAID

## 2023-07-06 VITALS
WEIGHT: 152.12 LBS | HEART RATE: 88 BPM | BODY MASS INDEX: 30.67 KG/M2 | HEIGHT: 59 IN | TEMPERATURE: 98.7 F | SYSTOLIC BLOOD PRESSURE: 177 MMHG | DIASTOLIC BLOOD PRESSURE: 70 MMHG

## 2023-07-06 LAB
ABO + RH BLD: NORMAL
ANION GAP SERPL CALC-SCNC: 5 MMOL/L (ref 5–15)
APPEARANCE UR: CLEAR
BACTERIA URNS QL MICRO: NEGATIVE /HPF
BILIRUB UR QL: NEGATIVE
BLOOD GROUP ANTIBODIES SERPL: NORMAL
BUN SERPL-MCNC: 25 MG/DL (ref 6–20)
BUN/CREAT SERPL: 23 (ref 12–20)
CALCIUM SERPL-MCNC: 10 MG/DL (ref 8.5–10.1)
CHLORIDE SERPL-SCNC: 102 MMOL/L (ref 97–108)
CO2 SERPL-SCNC: 31 MMOL/L (ref 21–32)
COLOR UR: ABNORMAL
CREAT SERPL-MCNC: 1.09 MG/DL (ref 0.55–1.02)
EPITH CASTS URNS QL MICRO: ABNORMAL /LPF
ERYTHROCYTE [DISTWIDTH] IN BLOOD BY AUTOMATED COUNT: 14.8 % (ref 11.5–14.5)
EST. AVERAGE GLUCOSE BLD GHB EST-MCNC: 148 MG/DL
GLUCOSE SERPL-MCNC: 104 MG/DL (ref 65–100)
GLUCOSE UR STRIP.AUTO-MCNC: NEGATIVE MG/DL
HBA1C MFR BLD: 6.8 % (ref 4–5.6)
HCT VFR BLD AUTO: 46.4 % (ref 35–47)
HGB BLD-MCNC: 15.2 G/DL (ref 11.5–16)
HGB UR QL STRIP: NEGATIVE
KETONES UR QL STRIP.AUTO: NEGATIVE MG/DL
LEUKOCYTE ESTERASE UR QL STRIP.AUTO: ABNORMAL
MCH RBC QN AUTO: 27.6 PG (ref 26–34)
MCHC RBC AUTO-ENTMCNC: 32.8 G/DL (ref 30–36.5)
MCV RBC AUTO: 84.4 FL (ref 80–99)
NITRITE UR QL STRIP.AUTO: NEGATIVE
NRBC # BLD: 0 K/UL (ref 0–0.01)
NRBC BLD-RTO: 0 PER 100 WBC
PH UR STRIP: 7 (ref 5–8)
PLATELET # BLD AUTO: 368 K/UL (ref 150–400)
PMV BLD AUTO: 10.2 FL (ref 8.9–12.9)
POTASSIUM SERPL-SCNC: 4 MMOL/L (ref 3.5–5.1)
PROT UR STRIP-MCNC: NEGATIVE MG/DL
RBC # BLD AUTO: 5.5 M/UL (ref 3.8–5.2)
RBC #/AREA URNS HPF: ABNORMAL /HPF (ref 0–5)
SODIUM SERPL-SCNC: 138 MMOL/L (ref 136–145)
SP GR UR REFRACTOMETRY: 1.01 (ref 1–1.03)
SPECIMEN EXP DATE BLD: NORMAL
URINE CULTURE IF INDICATED: ABNORMAL
UROBILINOGEN UR QL STRIP.AUTO: 0.2 EU/DL (ref 0.2–1)
WBC # BLD AUTO: 11 K/UL (ref 3.6–11)
WBC URNS QL MICRO: ABNORMAL /HPF (ref 0–4)

## 2023-07-06 PROCEDURE — 85027 COMPLETE CBC AUTOMATED: CPT

## 2023-07-06 PROCEDURE — 83036 HEMOGLOBIN GLYCOSYLATED A1C: CPT

## 2023-07-06 PROCEDURE — 36415 COLL VENOUS BLD VENIPUNCTURE: CPT

## 2023-07-06 PROCEDURE — 80048 BASIC METABOLIC PNL TOTAL CA: CPT

## 2023-07-06 PROCEDURE — 81001 URINALYSIS AUTO W/SCOPE: CPT

## 2023-07-06 PROCEDURE — 86901 BLOOD TYPING SEROLOGIC RH(D): CPT

## 2023-07-06 PROCEDURE — 86900 BLOOD TYPING SEROLOGIC ABO: CPT

## 2023-07-06 PROCEDURE — 86850 RBC ANTIBODY SCREEN: CPT

## 2023-07-06 RX ORDER — MULTIVIT WITH MINERALS/LUTEIN
1000 TABLET ORAL DAILY
COMMUNITY

## 2023-07-06 RX ORDER — AMOXICILLIN 500 MG
1200 CAPSULE ORAL EVERY MORNING
COMMUNITY

## 2023-07-06 ASSESSMENT — PAIN DESCRIPTION - ORIENTATION: ORIENTATION: RIGHT

## 2023-07-06 ASSESSMENT — PAIN DESCRIPTION - LOCATION
LOCATION_8: HIP
LOCATION: HIP;BACK;KNEE

## 2023-07-06 ASSESSMENT — PAIN SCALES - GENERAL: PAINLEVEL_OUTOF10: 8

## 2023-07-06 ASSESSMENT — PAIN DESCRIPTION - DESCRIPTORS: DESCRIPTORS: ACHING

## 2023-07-06 NOTE — PERIOP NOTE
[address] [place] [address]  ORTHOPAEDIC    Surgery Date:   7/12    Your surgeon's office or [facility] staff will call you between 4 PM- 8 PM the day before surgery with your arrival time. If your surgery is on a Monday, you will receive a call the preceding Friday. Please report to [facility] Patient Access/Admitting on the 1st floor. Bring your insurance card, photo identification, and any copayment (if applicable). If you are going home the same day of your surgery, you must have a responsible adult to drive you home. You need to have a responsible adult to stay with you the first 24 hours after surgery and you should not drive a car for 24 hours following your surgery. Do NOT eat any solid foods after midnight the night before surgery including candy, mints or gum. You may drink clear liquids from midnight until 1 hour prior to arrival time. You may drink up to 12 ounces at one time every 4 hours. Do NOT drink alcohol or smoke 24 hours before surgery. STOP smoking for 14 days prior as it helps with breathing and healing after surgery. If you are being admitted to the hospital,please leave personal belongings/luggage in your car until you have an assigned hospital room number. Please wear comfortable clothes. Wear your glasses instead of contacts. We ask that all money, jewelry and valuables be left at home. Wear no make up, particularly mascara, the day of surgery. All body piercings, rings, and jewelry need to be removed and left at home. Please remove any nail polish or artificial nails from your fingernails. Please wear your hair loose or down. Please no pony-tails, buns, or any metal hair accessories. If you shower the morning of surgery, please do not apply any lotions or powders afterwards. You may wear deodorant. Do not shave any body area within 24 hours of your surgery. Please follow all instructions to avoid any potential surgical cancellation.   Should your physical

## 2023-07-07 LAB
BACTERIA SPEC CULT: NORMAL
BACTERIA SPEC CULT: NORMAL
SERVICE CMNT-IMP: NORMAL

## 2023-07-08 LAB
EKG ATRIAL RATE: 77 BPM
EKG DIAGNOSIS: NORMAL
EKG P AXIS: 53 DEGREES
EKG P-R INTERVAL: 154 MS
EKG Q-T INTERVAL: 366 MS
EKG QRS DURATION: 82 MS
EKG QTC CALCULATION (BAZETT): 414 MS
EKG R AXIS: -27 DEGREES
EKG T AXIS: 52 DEGREES
EKG VENTRICULAR RATE: 77 BPM

## 2023-07-10 NOTE — PERIOP NOTE
SENT NOTE VIA Epic TO DR. Montes  NURSE , ДМИТРИЙ CAZARES. .. JEAN-CLAUDE CHOE,      JUST WANTED TO REMIND YOU SINCE THIS PATIENT CAME IN FOR P.A.T. ON 7/6/23 AND THEIR BMP SAMPLE HEMOLYZED AND PT/INR - LAB REJECTED FOR NOT ENOUGH SPECIMEN AND PATIENT WAS STUCK 6 TIMES AS SHE IS VERY HARD STICK, BMP AND PT/INR ORDERED FOR STAT DOS.      [address] RN

## 2023-07-10 NOTE — PERIOP NOTE
SURGEON OFFICE CALLED, PER TERESO WATERS NP, SINCE PATIENT IS VERY DIFFICULT STICK , AND BMP SPECIMEN HEMOLYZED AND LAB REJECTED PT/INR FOR NOT ENOUGH SPECIMEN, BMP AND PT/INR ORDERED FOR STAT DOS.

## 2023-07-11 ENCOUNTER — OFFICE VISIT (OUTPATIENT)
Age: 83
End: 2023-07-11
Payer: MEDICAID

## 2023-07-11 VITALS
RESPIRATION RATE: 20 BRPM | SYSTOLIC BLOOD PRESSURE: 143 MMHG | DIASTOLIC BLOOD PRESSURE: 62 MMHG | HEART RATE: 89 BPM | HEIGHT: 59 IN | OXYGEN SATURATION: 96 % | TEMPERATURE: 97.3 F | BODY MASS INDEX: 30.68 KG/M2 | WEIGHT: 152.2 LBS

## 2023-07-11 DIAGNOSIS — N18.32 CHRONIC KIDNEY DISEASE, STAGE 3B (HCC): ICD-10-CM

## 2023-07-11 DIAGNOSIS — E11.9 TYPE 2 DIABETES MELLITUS WITHOUT COMPLICATION, WITH LONG-TERM CURRENT USE OF INSULIN (HCC): ICD-10-CM

## 2023-07-11 DIAGNOSIS — M16.0 BILATERAL PRIMARY OSTEOARTHRITIS OF HIP: ICD-10-CM

## 2023-07-11 DIAGNOSIS — E89.0 POSTOPERATIVE HYPOTHYROIDISM: ICD-10-CM

## 2023-07-11 DIAGNOSIS — E78.00 PURE HYPERCHOLESTEROLEMIA: ICD-10-CM

## 2023-07-11 DIAGNOSIS — Z01.818 PREOP EXAMINATION: Primary | ICD-10-CM

## 2023-07-11 DIAGNOSIS — L29.9 ITCHING: ICD-10-CM

## 2023-07-11 DIAGNOSIS — I10 PRIMARY HYPERTENSION: ICD-10-CM

## 2023-07-11 DIAGNOSIS — G56.01 CARPAL TUNNEL SYNDROME OF RIGHT WRIST: ICD-10-CM

## 2023-07-11 DIAGNOSIS — Z79.4 TYPE 2 DIABETES MELLITUS WITHOUT COMPLICATION, WITH LONG-TERM CURRENT USE OF INSULIN (HCC): ICD-10-CM

## 2023-07-11 PROCEDURE — 1123F ACP DISCUSS/DSCN MKR DOCD: CPT | Performed by: FAMILY MEDICINE

## 2023-07-11 PROCEDURE — 3074F SYST BP LT 130 MM HG: CPT | Performed by: FAMILY MEDICINE

## 2023-07-11 PROCEDURE — 99203 OFFICE O/P NEW LOW 30 MIN: CPT | Performed by: FAMILY MEDICINE

## 2023-07-11 PROCEDURE — 3078F DIAST BP <80 MM HG: CPT | Performed by: FAMILY MEDICINE

## 2023-07-11 PROCEDURE — 3044F HG A1C LEVEL LT 7.0%: CPT | Performed by: FAMILY MEDICINE

## 2023-07-11 RX ORDER — PREDNISOLONE ACETATE 10 MG/ML
SUSPENSION/ DROPS OPHTHALMIC
COMMUNITY
Start: 2023-06-25

## 2023-07-11 RX ORDER — AMOXICILLIN AND CLAVULANATE POTASSIUM 875; 125 MG/1; MG/1
1 TABLET, FILM COATED ORAL 2 TIMES DAILY
COMMUNITY

## 2023-07-11 SDOH — ECONOMIC STABILITY: HOUSING INSECURITY
IN THE LAST 12 MONTHS, WAS THERE A TIME WHEN YOU DID NOT HAVE A STEADY PLACE TO SLEEP OR SLEPT IN A SHELTER (INCLUDING NOW)?: NO

## 2023-07-11 SDOH — ECONOMIC STABILITY: FOOD INSECURITY: WITHIN THE PAST 12 MONTHS, YOU WORRIED THAT YOUR FOOD WOULD RUN OUT BEFORE YOU GOT MONEY TO BUY MORE.: NEVER TRUE

## 2023-07-11 SDOH — ECONOMIC STABILITY: INCOME INSECURITY: HOW HARD IS IT FOR YOU TO PAY FOR THE VERY BASICS LIKE FOOD, HOUSING, MEDICAL CARE, AND HEATING?: NOT HARD AT ALL

## 2023-07-11 SDOH — ECONOMIC STABILITY: FOOD INSECURITY: WITHIN THE PAST 12 MONTHS, THE FOOD YOU BOUGHT JUST DIDN'T LAST AND YOU DIDN'T HAVE MONEY TO GET MORE.: NEVER TRUE

## 2023-07-11 ASSESSMENT — PATIENT HEALTH QUESTIONNAIRE - PHQ9
SUM OF ALL RESPONSES TO PHQ QUESTIONS 1-9: 0
SUM OF ALL RESPONSES TO PHQ QUESTIONS 1-9: 0
SUM OF ALL RESPONSES TO PHQ9 QUESTIONS 1 & 2: 0
SUM OF ALL RESPONSES TO PHQ QUESTIONS 1-9: 0
SUM OF ALL RESPONSES TO PHQ QUESTIONS 1-9: 0
1. LITTLE INTEREST OR PLEASURE IN DOING THINGS: 0
2. FEELING DOWN, DEPRESSED OR HOPELESS: 0

## 2023-07-11 NOTE — PROGRESS NOTES
Chief Complaint   Patient presents with    Establish Care     New patient
sensation: normal    On this date 07/12/23 I have spent 30 minutes reviewing previous notes, test results and face to face with the patient discussing the diagnosis and importance of compliance with the treatment plan as well as documenting on the day of the visit. An electronic signature was used to authenticate this note.   -- Neva Acevedo MD

## 2023-07-26 ENCOUNTER — ANESTHESIA (OUTPATIENT)
Facility: HOSPITAL | Age: 83
End: 2023-07-26
Payer: MEDICAID

## 2023-07-26 ENCOUNTER — ANESTHESIA EVENT (OUTPATIENT)
Facility: HOSPITAL | Age: 83
End: 2023-07-26
Payer: MEDICAID

## 2023-07-26 ENCOUNTER — APPOINTMENT (OUTPATIENT)
Facility: HOSPITAL | Age: 83
End: 2023-07-26
Attending: ORTHOPAEDIC SURGERY
Payer: MEDICAID

## 2023-07-26 ENCOUNTER — HOSPITAL ENCOUNTER (OUTPATIENT)
Facility: HOSPITAL | Age: 83
Setting detail: OBSERVATION
Discharge: NURSING, REHAB OR LONG-TERM CARE | End: 2023-08-01
Attending: ORTHOPAEDIC SURGERY | Admitting: ORTHOPAEDIC SURGERY
Payer: MEDICAID

## 2023-07-26 DIAGNOSIS — M16.11 OSTEOARTHRITIS OF RIGHT HIP, UNSPECIFIED OSTEOARTHRITIS TYPE: Primary | ICD-10-CM

## 2023-07-26 LAB
GLUCOSE BLD STRIP.AUTO-MCNC: 102 MG/DL (ref 65–117)
GLUCOSE BLD STRIP.AUTO-MCNC: 104 MG/DL (ref 65–117)
GLUCOSE BLD STRIP.AUTO-MCNC: 124 MG/DL (ref 65–117)
GLUCOSE BLD STRIP.AUTO-MCNC: 126 MG/DL (ref 65–117)
SERVICE CMNT-IMP: ABNORMAL
SERVICE CMNT-IMP: ABNORMAL
SERVICE CMNT-IMP: NORMAL
SERVICE CMNT-IMP: NORMAL

## 2023-07-26 PROCEDURE — 2580000003 HC RX 258: Performed by: ORTHOPAEDIC SURGERY

## 2023-07-26 PROCEDURE — 2500000003 HC RX 250 WO HCPCS: Performed by: NURSE ANESTHETIST, CERTIFIED REGISTERED

## 2023-07-26 PROCEDURE — 27130 TOTAL HIP ARTHROPLASTY: CPT | Performed by: ORTHOPAEDIC SURGERY

## 2023-07-26 PROCEDURE — 2500000003 HC RX 250 WO HCPCS: Performed by: ORTHOPAEDIC SURGERY

## 2023-07-26 PROCEDURE — 3600000015 HC SURGERY LEVEL 5 ADDTL 15MIN: Performed by: ORTHOPAEDIC SURGERY

## 2023-07-26 PROCEDURE — G0378 HOSPITAL OBSERVATION PER HR: HCPCS

## 2023-07-26 PROCEDURE — 7100000000 HC PACU RECOVERY - FIRST 15 MIN: Performed by: ORTHOPAEDIC SURGERY

## 2023-07-26 PROCEDURE — 2580000003 HC RX 258: Performed by: NURSE ANESTHETIST, CERTIFIED REGISTERED

## 2023-07-26 PROCEDURE — 6360000002 HC RX W HCPCS: Performed by: NURSE ANESTHETIST, CERTIFIED REGISTERED

## 2023-07-26 PROCEDURE — 0054T BONE SRGRY CMPTR FLUOR IMAGE: CPT | Performed by: ORTHOPAEDIC SURGERY

## 2023-07-26 PROCEDURE — 6360000002 HC RX W HCPCS: Performed by: ANESTHESIOLOGY

## 2023-07-26 PROCEDURE — 2709999900 HC NON-CHARGEABLE SUPPLY: Performed by: ORTHOPAEDIC SURGERY

## 2023-07-26 PROCEDURE — C1776 JOINT DEVICE (IMPLANTABLE): HCPCS | Performed by: ORTHOPAEDIC SURGERY

## 2023-07-26 PROCEDURE — 6360000002 HC RX W HCPCS: Performed by: ORTHOPAEDIC SURGERY

## 2023-07-26 PROCEDURE — 72170 X-RAY EXAM OF PELVIS: CPT

## 2023-07-26 PROCEDURE — 2580000003 HC RX 258: Performed by: PHYSICIAN ASSISTANT

## 2023-07-26 PROCEDURE — 3600000005 HC SURGERY LEVEL 5 BASE: Performed by: ORTHOPAEDIC SURGERY

## 2023-07-26 PROCEDURE — 82962 GLUCOSE BLOOD TEST: CPT

## 2023-07-26 PROCEDURE — C9290 INJ, BUPIVACAINE LIPOSOME: HCPCS | Performed by: ORTHOPAEDIC SURGERY

## 2023-07-26 PROCEDURE — 27130 TOTAL HIP ARTHROPLASTY: CPT | Performed by: PHYSICIAN ASSISTANT

## 2023-07-26 PROCEDURE — 3700000001 HC ADD 15 MINUTES (ANESTHESIA): Performed by: ORTHOPAEDIC SURGERY

## 2023-07-26 PROCEDURE — 6360000002 HC RX W HCPCS

## 2023-07-26 PROCEDURE — 7100000001 HC PACU RECOVERY - ADDTL 15 MIN: Performed by: ORTHOPAEDIC SURGERY

## 2023-07-26 PROCEDURE — 6360000002 HC RX W HCPCS: Performed by: PHYSICIAN ASSISTANT

## 2023-07-26 PROCEDURE — 3700000000 HC ANESTHESIA ATTENDED CARE: Performed by: ORTHOPAEDIC SURGERY

## 2023-07-26 PROCEDURE — 6370000000 HC RX 637 (ALT 250 FOR IP): Performed by: PHYSICIAN ASSISTANT

## 2023-07-26 DEVICE — BIOLOX DELTA CERAMIC FEMORAL HEAD 32MM DIA +5.0 12/14 TAPER
Type: IMPLANTABLE DEVICE | Site: HIP | Status: FUNCTIONAL
Brand: BIOLOX DELTA

## 2023-07-26 DEVICE — PINNACLE GRIPTION ACETABULAR SHELL SECTOR 48MM OD
Type: IMPLANTABLE DEVICE | Site: HIP | Status: FUNCTIONAL
Brand: PINNACLE GRIPTION

## 2023-07-26 DEVICE — ACTIS DUOFIX HIP PROSTHESIS (FEMORAL STEM 12/14 TAPER CEMENTLESS SIZE 1 STD COLLAR)  CE
Type: IMPLANTABLE DEVICE | Site: HIP | Status: FUNCTIONAL
Brand: ACTIS

## 2023-07-26 DEVICE — HIP H2 TOT ADV OTHER HD IMPL CAPPED SYNTHES: Type: IMPLANTABLE DEVICE | Site: HIP | Status: FUNCTIONAL

## 2023-07-26 DEVICE — PINNACLE CANCELLOUS BONE SCREW 6.5MM X 35MM
Type: IMPLANTABLE DEVICE | Site: HIP | Status: FUNCTIONAL
Brand: PINNACLE

## 2023-07-26 DEVICE — PINNACLE CANCELLOUS BONE SCREW 6.5MM X 30MM
Type: IMPLANTABLE DEVICE | Site: HIP | Status: FUNCTIONAL
Brand: PINNACLE

## 2023-07-26 DEVICE — PINNACLE CANCELLOUS BONE SCREW 6.5MM X 20MM
Type: IMPLANTABLE DEVICE | Site: HIP | Status: FUNCTIONAL
Brand: PINNACLE

## 2023-07-26 DEVICE — PINNACLE HIP SOLUTIONS ALTRX POLYETHYLENE ACETABULAR LINER NEUTRAL 32MM ID 48MM OD
Type: IMPLANTABLE DEVICE | Site: HIP | Status: FUNCTIONAL
Brand: PINNACLE ALTRX

## 2023-07-26 RX ORDER — SODIUM CHLORIDE 0.9 % (FLUSH) 0.9 %
5-40 SYRINGE (ML) INJECTION PRN
Status: DISCONTINUED | OUTPATIENT
Start: 2023-07-26 | End: 2023-07-26 | Stop reason: HOSPADM

## 2023-07-26 RX ORDER — SODIUM CHLORIDE 9 MG/ML
INJECTION, SOLUTION INTRAVENOUS CONTINUOUS
Status: DISCONTINUED | OUTPATIENT
Start: 2023-07-26 | End: 2023-07-28

## 2023-07-26 RX ORDER — FAMOTIDINE 20 MG/1
20 TABLET, FILM COATED ORAL NIGHTLY
Status: DISCONTINUED | OUTPATIENT
Start: 2023-07-26 | End: 2023-08-01 | Stop reason: HOSPADM

## 2023-07-26 RX ORDER — GLYCOPYRROLATE 0.2 MG/ML
INJECTION INTRAMUSCULAR; INTRAVENOUS PRN
Status: DISCONTINUED | OUTPATIENT
Start: 2023-07-26 | End: 2023-07-26 | Stop reason: SDUPTHER

## 2023-07-26 RX ORDER — SODIUM CHLORIDE 9 MG/ML
INJECTION, SOLUTION INTRAVENOUS PRN
Status: DISCONTINUED | OUTPATIENT
Start: 2023-07-26 | End: 2023-07-26 | Stop reason: HOSPADM

## 2023-07-26 RX ORDER — SODIUM CHLORIDE, SODIUM LACTATE, POTASSIUM CHLORIDE, CALCIUM CHLORIDE 600; 310; 30; 20 MG/100ML; MG/100ML; MG/100ML; MG/100ML
INJECTION, SOLUTION INTRAVENOUS CONTINUOUS PRN
Status: DISCONTINUED | OUTPATIENT
Start: 2023-07-26 | End: 2023-07-26 | Stop reason: SDUPTHER

## 2023-07-26 RX ORDER — FENTANYL CITRATE 50 UG/ML
INJECTION, SOLUTION INTRAMUSCULAR; INTRAVENOUS
Status: COMPLETED
Start: 2023-07-26 | End: 2023-07-26

## 2023-07-26 RX ORDER — LEVOTHYROXINE SODIUM 0.07 MG/1
75 TABLET ORAL
Status: DISCONTINUED | OUTPATIENT
Start: 2023-07-27 | End: 2023-08-01 | Stop reason: HOSPADM

## 2023-07-26 RX ORDER — AMLODIPINE BESYLATE 5 MG/1
10 TABLET ORAL
Status: DISCONTINUED | OUTPATIENT
Start: 2023-07-26 | End: 2023-08-01 | Stop reason: HOSPADM

## 2023-07-26 RX ORDER — OXYCODONE HYDROCHLORIDE 5 MG/1
5 TABLET ORAL
Status: DISCONTINUED | OUTPATIENT
Start: 2023-07-26 | End: 2023-07-26 | Stop reason: HOSPADM

## 2023-07-26 RX ORDER — ACETAMINOPHEN 500 MG
1000 TABLET ORAL ONCE
Status: DISCONTINUED | OUTPATIENT
Start: 2023-07-26 | End: 2023-07-26 | Stop reason: SDUPTHER

## 2023-07-26 RX ORDER — HYDROCHLOROTHIAZIDE 25 MG/1
12.5 TABLET ORAL DAILY
Status: DISCONTINUED | OUTPATIENT
Start: 2023-07-26 | End: 2023-08-01 | Stop reason: HOSPADM

## 2023-07-26 RX ORDER — ONDANSETRON 4 MG/1
4 TABLET, ORALLY DISINTEGRATING ORAL EVERY 8 HOURS PRN
Status: DISCONTINUED | OUTPATIENT
Start: 2023-07-26 | End: 2023-08-01 | Stop reason: HOSPADM

## 2023-07-26 RX ORDER — POLYETHYLENE GLYCOL 3350 17 G/17G
17 POWDER, FOR SOLUTION ORAL DAILY
Status: DISCONTINUED | OUTPATIENT
Start: 2023-07-26 | End: 2023-08-01 | Stop reason: HOSPADM

## 2023-07-26 RX ORDER — SODIUM CHLORIDE 0.9 % (FLUSH) 0.9 %
5-40 SYRINGE (ML) INJECTION EVERY 12 HOURS SCHEDULED
Status: DISCONTINUED | OUTPATIENT
Start: 2023-07-26 | End: 2023-08-01 | Stop reason: HOSPADM

## 2023-07-26 RX ORDER — SODIUM CHLORIDE 0.9 % (FLUSH) 0.9 %
5-40 SYRINGE (ML) INJECTION PRN
Status: DISCONTINUED | OUTPATIENT
Start: 2023-07-26 | End: 2023-08-01 | Stop reason: HOSPADM

## 2023-07-26 RX ORDER — MIDAZOLAM HYDROCHLORIDE 2 MG/2ML
0.5 INJECTION, SOLUTION INTRAMUSCULAR; INTRAVENOUS EVERY 5 MIN PRN
Status: COMPLETED | OUTPATIENT
Start: 2023-07-26 | End: 2023-07-26

## 2023-07-26 RX ORDER — ONDANSETRON 2 MG/ML
4 INJECTION INTRAMUSCULAR; INTRAVENOUS EVERY 6 HOURS PRN
Status: DISCONTINUED | OUTPATIENT
Start: 2023-07-26 | End: 2023-08-01 | Stop reason: HOSPADM

## 2023-07-26 RX ORDER — TRAMADOL HYDROCHLORIDE 50 MG/1
50 TABLET ORAL EVERY 6 HOURS PRN
Status: DISCONTINUED | OUTPATIENT
Start: 2023-07-26 | End: 2023-08-01 | Stop reason: HOSPADM

## 2023-07-26 RX ORDER — OXYCODONE HYDROCHLORIDE 5 MG/1
5 TABLET ORAL EVERY 4 HOURS PRN
Status: DISCONTINUED | OUTPATIENT
Start: 2023-07-26 | End: 2023-07-28

## 2023-07-26 RX ORDER — FENTANYL CITRATE 50 UG/ML
INJECTION, SOLUTION INTRAMUSCULAR; INTRAVENOUS PRN
Status: DISCONTINUED | OUTPATIENT
Start: 2023-07-26 | End: 2023-07-26 | Stop reason: SDUPTHER

## 2023-07-26 RX ORDER — CEFAZOLIN SODIUM 1 G/3ML
INJECTION, POWDER, FOR SOLUTION INTRAMUSCULAR; INTRAVENOUS PRN
Status: DISCONTINUED | OUTPATIENT
Start: 2023-07-26 | End: 2023-07-26 | Stop reason: SDUPTHER

## 2023-07-26 RX ORDER — LIDOCAINE HYDROCHLORIDE 10 MG/ML
1 INJECTION, SOLUTION EPIDURAL; INFILTRATION; INTRACAUDAL; PERINEURAL
Status: DISCONTINUED | OUTPATIENT
Start: 2023-07-26 | End: 2023-07-26 | Stop reason: HOSPADM

## 2023-07-26 RX ORDER — TRANEXAMIC ACID 100 MG/ML
INJECTION, SOLUTION INTRAVENOUS PRN
Status: DISCONTINUED | OUTPATIENT
Start: 2023-07-26 | End: 2023-07-26 | Stop reason: SDUPTHER

## 2023-07-26 RX ORDER — ONDANSETRON 2 MG/ML
INJECTION INTRAMUSCULAR; INTRAVENOUS PRN
Status: DISCONTINUED | OUTPATIENT
Start: 2023-07-26 | End: 2023-07-26 | Stop reason: SDUPTHER

## 2023-07-26 RX ORDER — ATORVASTATIN CALCIUM 40 MG/1
80 TABLET, FILM COATED ORAL
Status: DISCONTINUED | OUTPATIENT
Start: 2023-07-26 | End: 2023-08-01 | Stop reason: HOSPADM

## 2023-07-26 RX ORDER — SODIUM CHLORIDE 0.9 % (FLUSH) 0.9 %
5-40 SYRINGE (ML) INJECTION EVERY 12 HOURS SCHEDULED
Status: DISCONTINUED | OUTPATIENT
Start: 2023-07-26 | End: 2023-07-26 | Stop reason: HOSPADM

## 2023-07-26 RX ORDER — DEXTROSE MONOHYDRATE 100 MG/ML
INJECTION, SOLUTION INTRAVENOUS CONTINUOUS PRN
Status: DISCONTINUED | OUTPATIENT
Start: 2023-07-26 | End: 2023-08-01 | Stop reason: HOSPADM

## 2023-07-26 RX ORDER — ACETAMINOPHEN 325 MG/1
650 TABLET ORAL EVERY 6 HOURS SCHEDULED
Status: DISCONTINUED | OUTPATIENT
Start: 2023-07-26 | End: 2023-08-01 | Stop reason: HOSPADM

## 2023-07-26 RX ORDER — LIDOCAINE HYDROCHLORIDE 20 MG/ML
INJECTION, SOLUTION EPIDURAL; INFILTRATION; INTRACAUDAL; PERINEURAL PRN
Status: DISCONTINUED | OUTPATIENT
Start: 2023-07-26 | End: 2023-07-26 | Stop reason: SDUPTHER

## 2023-07-26 RX ORDER — ACETAMINOPHEN 500 MG
1000 TABLET ORAL ONCE
Status: COMPLETED | OUTPATIENT
Start: 2023-07-26 | End: 2023-07-26

## 2023-07-26 RX ORDER — VALSARTAN 160 MG/1
320 TABLET ORAL DAILY
Status: DISCONTINUED | OUTPATIENT
Start: 2023-07-26 | End: 2023-08-01 | Stop reason: HOSPADM

## 2023-07-26 RX ORDER — ONDANSETRON 2 MG/ML
4 INJECTION INTRAMUSCULAR; INTRAVENOUS
Status: DISCONTINUED | OUTPATIENT
Start: 2023-07-26 | End: 2023-07-26 | Stop reason: HOSPADM

## 2023-07-26 RX ORDER — HYDROMORPHONE HYDROCHLORIDE 1 MG/ML
0.5 INJECTION, SOLUTION INTRAMUSCULAR; INTRAVENOUS; SUBCUTANEOUS
Status: DISCONTINUED | OUTPATIENT
Start: 2023-07-26 | End: 2023-07-28

## 2023-07-26 RX ORDER — DEXAMETHASONE SODIUM PHOSPHATE 4 MG/ML
8 INJECTION, SOLUTION INTRA-ARTICULAR; INTRALESIONAL; INTRAMUSCULAR; INTRAVENOUS; SOFT TISSUE ONCE
Status: DISCONTINUED | OUTPATIENT
Start: 2023-07-26 | End: 2023-07-26 | Stop reason: HOSPADM

## 2023-07-26 RX ORDER — FENTANYL CITRATE 50 UG/ML
25 INJECTION, SOLUTION INTRAMUSCULAR; INTRAVENOUS EVERY 5 MIN PRN
Status: COMPLETED | OUTPATIENT
Start: 2023-07-26 | End: 2023-07-26

## 2023-07-26 RX ORDER — HYDROXYZINE HYDROCHLORIDE 10 MG/1
10 TABLET, FILM COATED ORAL EVERY 8 HOURS PRN
Status: DISCONTINUED | OUTPATIENT
Start: 2023-07-26 | End: 2023-08-01 | Stop reason: HOSPADM

## 2023-07-26 RX ORDER — PREGABALIN 75 MG/1
75 CAPSULE ORAL ONCE
Status: COMPLETED | OUTPATIENT
Start: 2023-07-26 | End: 2023-07-26

## 2023-07-26 RX ORDER — SODIUM CHLORIDE, SODIUM LACTATE, POTASSIUM CHLORIDE, CALCIUM CHLORIDE 600; 310; 30; 20 MG/100ML; MG/100ML; MG/100ML; MG/100ML
INJECTION, SOLUTION INTRAVENOUS CONTINUOUS
Status: DISCONTINUED | OUTPATIENT
Start: 2023-07-26 | End: 2023-07-26 | Stop reason: HOSPADM

## 2023-07-26 RX ORDER — SENNA AND DOCUSATE SODIUM 50; 8.6 MG/1; MG/1
1 TABLET, FILM COATED ORAL 2 TIMES DAILY
Status: DISCONTINUED | OUTPATIENT
Start: 2023-07-26 | End: 2023-08-01 | Stop reason: HOSPADM

## 2023-07-26 RX ORDER — MIDAZOLAM HYDROCHLORIDE 2 MG/2ML
2 INJECTION, SOLUTION INTRAMUSCULAR; INTRAVENOUS
Status: DISCONTINUED | OUTPATIENT
Start: 2023-07-26 | End: 2023-07-26 | Stop reason: HOSPADM

## 2023-07-26 RX ORDER — PROCHLORPERAZINE EDISYLATE 5 MG/ML
5 INJECTION INTRAMUSCULAR; INTRAVENOUS
Status: DISCONTINUED | OUTPATIENT
Start: 2023-07-26 | End: 2023-07-26 | Stop reason: HOSPADM

## 2023-07-26 RX ORDER — MIDAZOLAM HYDROCHLORIDE 1 MG/ML
INJECTION INTRAMUSCULAR; INTRAVENOUS
Status: COMPLETED
Start: 2023-07-26 | End: 2023-07-26

## 2023-07-26 RX ORDER — HYDROMORPHONE HYDROCHLORIDE 1 MG/ML
0.5 INJECTION, SOLUTION INTRAMUSCULAR; INTRAVENOUS; SUBCUTANEOUS EVERY 5 MIN PRN
Status: DISCONTINUED | OUTPATIENT
Start: 2023-07-26 | End: 2023-07-26 | Stop reason: HOSPADM

## 2023-07-26 RX ORDER — NALOXONE HYDROCHLORIDE 0.4 MG/ML
0.4 INJECTION, SOLUTION INTRAMUSCULAR; INTRAVENOUS; SUBCUTANEOUS PRN
Status: DISCONTINUED | OUTPATIENT
Start: 2023-07-26 | End: 2023-08-01 | Stop reason: HOSPADM

## 2023-07-26 RX ORDER — FENTANYL CITRATE 50 UG/ML
100 INJECTION, SOLUTION INTRAMUSCULAR; INTRAVENOUS
Status: DISCONTINUED | OUTPATIENT
Start: 2023-07-26 | End: 2023-07-26 | Stop reason: HOSPADM

## 2023-07-26 RX ORDER — INSULIN LISPRO 100 [IU]/ML
0-4 INJECTION, SOLUTION INTRAVENOUS; SUBCUTANEOUS NIGHTLY
Status: DISCONTINUED | OUTPATIENT
Start: 2023-07-26 | End: 2023-08-01 | Stop reason: HOSPADM

## 2023-07-26 RX ORDER — HYDRALAZINE HYDROCHLORIDE 20 MG/ML
10 INJECTION INTRAMUSCULAR; INTRAVENOUS
Status: DISCONTINUED | OUTPATIENT
Start: 2023-07-26 | End: 2023-07-26 | Stop reason: HOSPADM

## 2023-07-26 RX ORDER — MIDAZOLAM HYDROCHLORIDE 1 MG/ML
INJECTION INTRAMUSCULAR; INTRAVENOUS PRN
Status: DISCONTINUED | OUTPATIENT
Start: 2023-07-26 | End: 2023-07-26 | Stop reason: SDUPTHER

## 2023-07-26 RX ORDER — SODIUM CHLORIDE 9 MG/ML
INJECTION, SOLUTION INTRAVENOUS PRN
Status: DISCONTINUED | OUTPATIENT
Start: 2023-07-26 | End: 2023-08-01 | Stop reason: HOSPADM

## 2023-07-26 RX ORDER — INSULIN LISPRO 100 [IU]/ML
0-8 INJECTION, SOLUTION INTRAVENOUS; SUBCUTANEOUS
Status: DISCONTINUED | OUTPATIENT
Start: 2023-07-26 | End: 2023-08-01 | Stop reason: HOSPADM

## 2023-07-26 RX ADMIN — HYDROCHLOROTHIAZIDE 12.5 MG: 25 TABLET ORAL at 16:17

## 2023-07-26 RX ADMIN — OXYCODONE HYDROCHLORIDE 5 MG: 5 TABLET ORAL at 22:58

## 2023-07-26 RX ADMIN — ACETAMINOPHEN 1000 MG: 500 TABLET ORAL at 10:56

## 2023-07-26 RX ADMIN — FENTANYL CITRATE 25 MCG: 50 INJECTION, SOLUTION INTRAMUSCULAR; INTRAVENOUS at 13:55

## 2023-07-26 RX ADMIN — GLYCOPYRROLATE 0.2 MG: 0.2 INJECTION INTRAMUSCULAR; INTRAVENOUS at 11:33

## 2023-07-26 RX ADMIN — CEFAZOLIN 2 G: 330 INJECTION, POWDER, FOR SOLUTION INTRAMUSCULAR; INTRAVENOUS at 11:42

## 2023-07-26 RX ADMIN — MIDAZOLAM 0.5 MG: 1 INJECTION INTRAMUSCULAR; INTRAVENOUS at 13:46

## 2023-07-26 RX ADMIN — TRANEXAMIC ACID 1000 MG: 100 INJECTION, SOLUTION INTRAVENOUS at 11:50

## 2023-07-26 RX ADMIN — POLYETHYLENE GLYCOL 3350 17 G: 17 POWDER, FOR SOLUTION ORAL at 16:16

## 2023-07-26 RX ADMIN — PROPOFOL 75 MCG/KG/MIN: 10 INJECTION, EMULSION INTRAVENOUS at 11:37

## 2023-07-26 RX ADMIN — PREGABALIN 75 MG: 75 CAPSULE ORAL at 10:56

## 2023-07-26 RX ADMIN — SODIUM CHLORIDE, PRESERVATIVE FREE 10 ML: 5 INJECTION INTRAVENOUS at 23:01

## 2023-07-26 RX ADMIN — FENTANYL CITRATE 25 MCG: 50 INJECTION, SOLUTION INTRAMUSCULAR; INTRAVENOUS at 11:07

## 2023-07-26 RX ADMIN — ACETAMINOPHEN 650 MG: 325 TABLET ORAL at 16:17

## 2023-07-26 RX ADMIN — MIDAZOLAM 1 MG: 1 INJECTION INTRAMUSCULAR; INTRAVENOUS at 11:01

## 2023-07-26 RX ADMIN — FENTANYL CITRATE 50 MCG: 50 INJECTION, SOLUTION INTRAMUSCULAR; INTRAVENOUS at 11:12

## 2023-07-26 RX ADMIN — FENTANYL CITRATE 25 MCG: 50 INJECTION, SOLUTION INTRAMUSCULAR; INTRAVENOUS at 14:10

## 2023-07-26 RX ADMIN — FENTANYL CITRATE 25 MCG: 50 INJECTION, SOLUTION INTRAMUSCULAR; INTRAVENOUS at 13:45

## 2023-07-26 RX ADMIN — PROPOFOL 50 MCG/KG/MIN: 10 INJECTION, EMULSION INTRAVENOUS at 11:32

## 2023-07-26 RX ADMIN — MIDAZOLAM 0.5 MG: 1 INJECTION INTRAMUSCULAR; INTRAVENOUS at 13:56

## 2023-07-26 RX ADMIN — ACETAMINOPHEN 650 MG: 325 TABLET ORAL at 22:57

## 2023-07-26 RX ADMIN — SODIUM CHLORIDE: 9 INJECTION, SOLUTION INTRAVENOUS at 13:59

## 2023-07-26 RX ADMIN — MEPIVACAINE HYDROCHLORIDE 45 MG: 15 INJECTION, SOLUTION EPIDURAL; INFILTRATION at 11:20

## 2023-07-26 RX ADMIN — GLYCOPYRROLATE 0.2 MG: 0.2 INJECTION INTRAMUSCULAR; INTRAVENOUS at 11:50

## 2023-07-26 RX ADMIN — MIDAZOLAM 0.5 MG: 1 INJECTION INTRAMUSCULAR; INTRAVENOUS at 14:35

## 2023-07-26 RX ADMIN — FAMOTIDINE 20 MG: 20 TABLET ORAL at 22:57

## 2023-07-26 RX ADMIN — OXYCODONE HYDROCHLORIDE 5 MG: 5 TABLET ORAL at 16:17

## 2023-07-26 RX ADMIN — LIDOCAINE HYDROCHLORIDE 25 MG: 20 INJECTION, SOLUTION EPIDURAL; INFILTRATION; INTRACAUDAL; PERINEURAL at 11:22

## 2023-07-26 RX ADMIN — MIDAZOLAM 1 MG: 1 INJECTION INTRAMUSCULAR; INTRAVENOUS at 11:08

## 2023-07-26 RX ADMIN — SENNOSIDES AND DOCUSATE SODIUM 1 TABLET: 50; 8.6 TABLET ORAL at 22:58

## 2023-07-26 RX ADMIN — ATORVASTATIN CALCIUM 80 MG: 40 TABLET, FILM COATED ORAL at 22:56

## 2023-07-26 RX ADMIN — ONDANSETRON HYDROCHLORIDE 4 MG: 2 INJECTION, SOLUTION INTRAMUSCULAR; INTRAVENOUS at 12:24

## 2023-07-26 RX ADMIN — PHENYLEPHRINE HYDROCHLORIDE 40 MCG/MIN: 10 INJECTION INTRAVENOUS at 11:22

## 2023-07-26 RX ADMIN — VALSARTAN 320 MG: 160 TABLET, FILM COATED ORAL at 18:48

## 2023-07-26 RX ADMIN — FENTANYL CITRATE 25 MCG: 50 INJECTION, SOLUTION INTRAMUSCULAR; INTRAVENOUS at 14:21

## 2023-07-26 RX ADMIN — APIXABAN 2.5 MG: 2.5 TABLET, FILM COATED ORAL at 22:57

## 2023-07-26 RX ADMIN — SODIUM CHLORIDE, POTASSIUM CHLORIDE, SODIUM LACTATE AND CALCIUM CHLORIDE: 600; 310; 30; 20 INJECTION, SOLUTION INTRAVENOUS at 10:55

## 2023-07-26 RX ADMIN — FENTANYL CITRATE 25 MCG: 50 INJECTION, SOLUTION INTRAMUSCULAR; INTRAVENOUS at 11:01

## 2023-07-26 RX ADMIN — PROPOFOL 30 MCG/KG/MIN: 10 INJECTION, EMULSION INTRAVENOUS at 11:22

## 2023-07-26 RX ADMIN — AMLODIPINE BESYLATE 10 MG: 5 TABLET ORAL at 22:57

## 2023-07-26 RX ADMIN — MIDAZOLAM 0.5 MG: 1 INJECTION INTRAMUSCULAR; INTRAVENOUS at 14:20

## 2023-07-26 RX ADMIN — WATER 2000 MG: 1 INJECTION INTRAMUSCULAR; INTRAVENOUS; SUBCUTANEOUS at 19:32

## 2023-07-26 ASSESSMENT — PAIN DESCRIPTION - DESCRIPTORS
DESCRIPTORS: SORE
DESCRIPTORS: ACHING;SORE
DESCRIPTORS: SORE
DESCRIPTORS: ACHING;CRAMPING;DISCOMFORT
DESCRIPTORS: ACHING;SORE

## 2023-07-26 ASSESSMENT — PAIN SCALES - GENERAL
PAINLEVEL_OUTOF10: 5
PAINLEVEL_OUTOF10: 6
PAINLEVEL_OUTOF10: 8
PAINLEVEL_OUTOF10: 8
PAINLEVEL_OUTOF10: 3
PAINLEVEL_OUTOF10: 6
PAINLEVEL_OUTOF10: 6
PAINLEVEL_OUTOF10: 8
PAINLEVEL_OUTOF10: 0
PAINLEVEL_OUTOF10: 4
PAINLEVEL_OUTOF10: 6

## 2023-07-26 ASSESSMENT — PAIN DESCRIPTION - LOCATION
LOCATION: BACK;HIP
LOCATION: BACK
LOCATION: HIP
LOCATION: BACK;HIP
LOCATION: HIP
LOCATION: HIP;KNEE;LEG
LOCATION: BACK

## 2023-07-26 ASSESSMENT — PAIN SCALES - WONG BAKER
WONGBAKER_NUMERICALRESPONSE: 0

## 2023-07-26 ASSESSMENT — PAIN DESCRIPTION - ORIENTATION
ORIENTATION: LOWER
ORIENTATION: LOWER
ORIENTATION: RIGHT
ORIENTATION: LEFT
ORIENTATION: RIGHT
ORIENTATION: RIGHT;LOWER

## 2023-07-26 ASSESSMENT — PAIN - FUNCTIONAL ASSESSMENT: PAIN_FUNCTIONAL_ASSESSMENT: NONE - DENIES PAIN

## 2023-07-26 NOTE — DISCHARGE INSTRUCTIONS
Discharge Instructions Hip Replacement  Dr. Diaz All    Patient Name  Fernando Mondragon  Date of procedure  [unfilled]    Procedure  Procedure(s):  RIGHT HIP TOTAL ARTHROPLASTY ANTERIOR APPROACH NAVIGATION (Catherine Roman)  Surgeon  Surgeon(s) and Role:     * Beka Tinoco MD - Primary  Date of discharge: [unfilled]  PCP: @PCP@    Follow up care  Follow up visit with Dr. Diaz All in 4 weeks. Call [phone] [facility]) to make an appointment. If Home Health has been arranged for you, they will call you to arrange dates/times for visits. Call them if you do not hear from them within 24 hours after you go home. Activity at home  AVOID sudden and extreme movement of your hip (surgical leg)  Take a short walk every hour; except at night when sleeping. Do your Home Exercise Program 3 times every day. After exercising lie down and elevate your leg on pillows for 15-30 minutes to decrease swelling. Refer to your patient notebook for more information. Bathing and caring for your incision  You may take a shower with your waterproof dressing on your hip. The waterproof dressing is to stay on your hip for 7 days. On the 7th day have someone gently peel the dressing off by lifting the edge and stretching it to break the seal.  You may then leave your incision open to air unless you see drainage from your hip. Preventing blood clots  Take Xarelto 10 mg daily for one month (30 days) following surgery  Call Dr. Diaz All for signs of a blood clot in your leg: calf pain, tenderness, redness, swelling of lower leg   Preventing lung congestion  Use your incentive spirometer 4 times a day; do 10 repetitions each time  Remember to keep the small blue ball between the two arrows when taking a slow, deep breath   Pain Management  Get up and walk a short distance to relieve pain and stiffness. Place ice wrap on your hip except when you are walking. The gel ice packs should be changed about every 4 hours.   Elevate your leg on

## 2023-07-26 NOTE — ANESTHESIA PRE PROCEDURE
Department of Anesthesiology  Preprocedure Note       Name:  Sukhwinder Darnell   Age:  79 y.o.  :  1940                                          MRN:  [id number]         Date:  2023      Surgeon: Stanford Wilkinson):  Sonia Biswas MD    Procedure: Procedure(s):  RIGHT HIP TOTAL ARTHROPLASTY ANTERIOR APPROACH NAVIGATION (VELYS)    Medications prior to admission:   Prior to Admission medications    Medication Sig Start Date End Date Taking? Authorizing Provider   prednisoLONE acetate (PRED FORTE) 1 % ophthalmic suspension  23   Historical Provider, MD   amoxicillin-clavulanate (AUGMENTIN) 875-125 MG per tablet Take 1 tablet by mouth 2 times daily    Historical Provider, MD   diclofenac sodium (VOLTAREN) 1 % GEL Apply 2 g topically 4 times daily 23   Louie Justin MD   camphor-menthol [facility]) 0.5-0.5 % lotion Apply topically as needed. 23   Louie Justin MD   Albuterol (VENTOLIN IN) Inhale into the lungs as needed    Historical Provider, MD   Multiple Vitamins-Minerals (CENTRUM SILVER 50+WOMEN PO) Take 1 tablet by mouth every morning    Historical Provider, MD   Ascorbic Acid (VITAMIN C) 1000 MG tablet Take 1 tablet by mouth daily    Historical Provider, MD   Omega-3 Fatty Acids (FISH OIL) 1200 MG CAPS Take 1,200 mg by mouth every morning    Historical Provider, MD   acetaminophen (TYLENOL) 650 MG extended release tablet Take 1 tablet by mouth in the morning and 1 tablet at noon and 1 tablet in the evening.     Ar Automatic Reconciliation   amLODIPine (NORVASC) 5 MG tablet Take 2 tablets by mouth nightly 10/6/22   Ar Automatic Reconciliation   atorvastatin (LIPITOR) 80 MG tablet Take 1 tablet by mouth nightly 10/6/22   Ar Automatic Reconciliation   hydroCHLOROthiazide (HYDRODIURIL) 12.5 MG tablet Take 1 tablet by mouth daily 22   Ar Automatic Reconciliation   levothyroxine (SYNTHROID) 75 MCG tablet Take 1 tablet by mouth every morning (before breakfast) 10/6/22   Ar Automatic

## 2023-07-26 NOTE — PROGRESS NOTES
Pharmacy Note - Renal dose adjustment made per P/T protocol    Original order:  Famotidine 20 mg (po or IV) q 12 h    Estimated Creatinine Clearance: 35 mL/min (A) (based on SCr of 1.09 mg/dL (H)). No results for input(s): BUN, CREATININE in the last 72 hours. Renally adjusted order:  Famotidine 20 mg (po or IV) q HS    Please call pharmacy with any questions.     Thank you,  [place]to [place], [facility] MS  7/26/2023 3:20 PM

## 2023-07-26 NOTE — ANESTHESIA PROCEDURE NOTES
Spinal Block    End time: 7/26/2023 11:20 AM  Reason for block: procedure for pain, primary anesthetic and at surgeon's request  Spinal Block  Patient position: sitting  Prep: ChloraPrep  Patient monitoring: oxygen and continuous pulse ox  Approach: midline  Location: L3/L4  Provider prep: mask and sterile gloves  Local infiltration: lidocaine  Needle  Needle type: Sprotte Tip   Needle gauge: 25 G  Needle length: 4 in  Assessment  CSF: clear  Attempts: 2  Hemodynamics: stable  Preanesthetic Checklist  Completed: patient identified, IV checked, site marked, risks and benefits discussed, surgical/procedural consents, equipment checked, pre-op evaluation, timeout performed, anesthesia consent given, oxygen available, monitors applied/VS acknowledged, fire risk safety assessment completed and verbalized and blood product R/B/A discussed and consented

## 2023-07-26 NOTE — H&P
Update History & Physical    The patient's History and Physical  was reviewed with the patient and I examined the patient. There was no change. The surgical site was confirmed by the patient and me. Plan: The risks, benefits, expected outcome, and alternative to the recommended procedure have been discussed with the patient. Patient understands and wants to proceed with the procedure.      Electronically signed by Elise Coello MD on 7/26/2023 at 9:44 AM

## 2023-07-26 NOTE — PROGRESS NOTES
Nurse and CNA assisted patient up to bedside commode. Patient output 300. Documented. End of Shift Note    Bedside shift change report given to [facility] (oncoming nurse) by Temitope Schilling LPN (offgoing nurse). Report included the following information SBAR, Kardex, Intake/Output, and MAR    Shift worked:  3091-1221     Shift summary and any significant changes: Up with 1 assist   Family present   Attempted room air patient desatted to 80. 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 Placed on 2 L patient now in mid 80's   RN preformed admission assessment   LPN reassessments   Tolerated diet   Tolerated medication   Pain management    Concerns for physician to address: None    Zone phone for oncoming shift:  None        Activity:     Number times ambulated in hallways past shift: 0  Number of times OOB to chair past shift: 0    Cardiac:   Cardiac Monitoring: No           Access:  Current line(s): IJ     Genitourinary:   Urinary status: voiding    Respiratory:      Chronic home O2 use?: NO  Incentive spirometer at bedside: YES       GI:     Current diet:  ADULT DIET; Regular; 4 carb choices (60 gm/meal)  Passing flatus: NO  Tolerating current diet: YES       Pain Management:   Patient states pain is manageable on current regimen: YES    Skin:     Interventions: turn team, specialty bed, increase time out of bed, foam dressing, and PT/OT consult    Patient Safety:  Fall Score:    Interventions: bed/chair alarm, assistive device (walker, cane.  etc), gripper socks, pt to call before getting OOB, and stay with me (per policy)       Length of Stay:  Expected LOS: 1  Actual LOS: [address] [facility] [address]

## 2023-07-27 LAB
ANION GAP SERPL CALC-SCNC: 10 MMOL/L (ref 5–15)
BUN SERPL-MCNC: 14 MG/DL (ref 6–20)
BUN/CREAT SERPL: 15 (ref 12–20)
CALCIUM SERPL-MCNC: 9 MG/DL (ref 8.5–10.1)
CHLORIDE SERPL-SCNC: 102 MMOL/L (ref 97–108)
CO2 SERPL-SCNC: 22 MMOL/L (ref 21–32)
CREAT SERPL-MCNC: 0.94 MG/DL (ref 0.55–1.02)
GLUCOSE BLD STRIP.AUTO-MCNC: 144 MG/DL (ref 65–117)
GLUCOSE BLD STRIP.AUTO-MCNC: 154 MG/DL (ref 65–117)
GLUCOSE BLD STRIP.AUTO-MCNC: 171 MG/DL (ref 65–117)
GLUCOSE BLD STRIP.AUTO-MCNC: 198 MG/DL (ref 65–117)
GLUCOSE SERPL-MCNC: 135 MG/DL (ref 65–100)
HCT VFR BLD AUTO: 42.3 % (ref 35–47)
HGB BLD-MCNC: 13.2 G/DL (ref 11.5–16)
POTASSIUM SERPL-SCNC: 3.7 MMOL/L (ref 3.5–5.1)
SERVICE CMNT-IMP: ABNORMAL
SODIUM SERPL-SCNC: 134 MMOL/L (ref 136–145)

## 2023-07-27 PROCEDURE — 85018 HEMOGLOBIN: CPT

## 2023-07-27 PROCEDURE — 97116 GAIT TRAINING THERAPY: CPT

## 2023-07-27 PROCEDURE — 94760 N-INVAS EAR/PLS OXIMETRY 1: CPT

## 2023-07-27 PROCEDURE — G0378 HOSPITAL OBSERVATION PER HR: HCPCS

## 2023-07-27 PROCEDURE — 97161 PT EVAL LOW COMPLEX 20 MIN: CPT

## 2023-07-27 PROCEDURE — 97165 OT EVAL LOW COMPLEX 30 MIN: CPT

## 2023-07-27 PROCEDURE — 6370000000 HC RX 637 (ALT 250 FOR IP)

## 2023-07-27 PROCEDURE — 6360000002 HC RX W HCPCS: Performed by: PHYSICIAN ASSISTANT

## 2023-07-27 PROCEDURE — 2700000000 HC OXYGEN THERAPY PER DAY

## 2023-07-27 PROCEDURE — 97535 SELF CARE MNGMENT TRAINING: CPT

## 2023-07-27 PROCEDURE — 85014 HEMATOCRIT: CPT

## 2023-07-27 PROCEDURE — 97110 THERAPEUTIC EXERCISES: CPT

## 2023-07-27 PROCEDURE — 2580000003 HC RX 258: Performed by: PHYSICIAN ASSISTANT

## 2023-07-27 PROCEDURE — 82962 GLUCOSE BLOOD TEST: CPT

## 2023-07-27 PROCEDURE — 6370000000 HC RX 637 (ALT 250 FOR IP): Performed by: PHYSICIAN ASSISTANT

## 2023-07-27 PROCEDURE — 80048 BASIC METABOLIC PNL TOTAL CA: CPT

## 2023-07-27 PROCEDURE — 97530 THERAPEUTIC ACTIVITIES: CPT

## 2023-07-27 PROCEDURE — 36415 COLL VENOUS BLD VENIPUNCTURE: CPT

## 2023-07-27 RX ORDER — DEXAMETHASONE 4 MG/1
4 TABLET ORAL
Qty: 4 TABLET | Refills: 0 | Status: SHIPPED | OUTPATIENT
Start: 2023-07-27 | End: 2023-08-01 | Stop reason: HOSPADM

## 2023-07-27 RX ORDER — NALOXONE HYDROCHLORIDE 4 MG/.1ML
1 SPRAY NASAL PRN
Qty: 1 EACH | Refills: 0 | Status: SHIPPED | OUTPATIENT
Start: 2023-07-27

## 2023-07-27 RX ORDER — OXYCODONE HYDROCHLORIDE 5 MG/1
5 TABLET ORAL EVERY 4 HOURS PRN
Qty: 42 TABLET | Refills: 0 | Status: SHIPPED | OUTPATIENT
Start: 2023-07-27 | End: 2023-07-28 | Stop reason: SDUPTHER

## 2023-07-27 RX ORDER — TRAMADOL HYDROCHLORIDE 50 MG/1
50 TABLET ORAL EVERY 6 HOURS PRN
Qty: 42 TABLET | Refills: 0 | Status: SHIPPED | OUTPATIENT
Start: 2023-07-27 | End: 2023-07-28 | Stop reason: SDUPTHER

## 2023-07-27 RX ADMIN — WATER 2000 MG: 1 INJECTION INTRAMUSCULAR; INTRAVENOUS; SUBCUTANEOUS at 06:26

## 2023-07-27 RX ADMIN — POLYETHYLENE GLYCOL 3350 17 G: 17 POWDER, FOR SOLUTION ORAL at 10:15

## 2023-07-27 RX ADMIN — ACETAMINOPHEN 650 MG: 325 TABLET ORAL at 11:43

## 2023-07-27 RX ADMIN — ACETAMINOPHEN 650 MG: 325 TABLET ORAL at 17:28

## 2023-07-27 RX ADMIN — VALSARTAN 320 MG: 160 TABLET, FILM COATED ORAL at 10:09

## 2023-07-27 RX ADMIN — FAMOTIDINE 20 MG: 20 TABLET ORAL at 20:52

## 2023-07-27 RX ADMIN — ATORVASTATIN CALCIUM 80 MG: 40 TABLET, FILM COATED ORAL at 20:51

## 2023-07-27 RX ADMIN — HYDROCHLOROTHIAZIDE 12.5 MG: 25 TABLET ORAL at 10:15

## 2023-07-27 RX ADMIN — SENNOSIDES AND DOCUSATE SODIUM 1 TABLET: 50; 8.6 TABLET ORAL at 10:16

## 2023-07-27 RX ADMIN — RIVAROXABAN 10 MG: 10 TABLET, FILM COATED ORAL at 11:43

## 2023-07-27 RX ADMIN — SODIUM CHLORIDE, PRESERVATIVE FREE 10 ML: 5 INJECTION INTRAVENOUS at 10:16

## 2023-07-27 RX ADMIN — LEVOTHYROXINE SODIUM 75 MCG: 0.07 TABLET ORAL at 06:26

## 2023-07-27 RX ADMIN — SENNOSIDES AND DOCUSATE SODIUM 1 TABLET: 50; 8.6 TABLET ORAL at 20:51

## 2023-07-27 RX ADMIN — ACETAMINOPHEN 650 MG: 325 TABLET ORAL at 06:25

## 2023-07-27 RX ADMIN — AMLODIPINE BESYLATE 10 MG: 5 TABLET ORAL at 20:51

## 2023-07-27 NOTE — ANESTHESIA POSTPROCEDURE EVALUATION
Department of Anesthesiology  Postprocedure Note    Patient: Pinky Nair  MRN: [id number]  YOB: 1940  Date of evaluation: 7/27/2023      Procedure Summary     Date: 07/26/23 Room / Location: [facility] MAIN OR 23 / [facility] MAIN OR    Anesthesia Start: 1104 Anesthesia Stop: 5926    Procedure: RIGHT HIP TOTAL ARTHROPLASTY ANTERIOR APPROACH NAVIGATION (VELYS) (Right: Hip) Diagnosis:       Primary osteoarthritis of right hip      (Primary osteoarthritis of right hip [M16.11])    Providers: Bayron Gillette MD Responsible Provider: Wendy Donnelly MD    Anesthesia Type: Spinal ASA Status: 3          Anesthesia Type: Spinal    Jorge Phase I: Jorge Score: 9    Jorge Phase II:        Anesthesia Post Evaluation    Patient location during evaluation: PACU  Patient participation: complete - patient participated  Level of consciousness: awake  Pain score: 1  Airway patency: patent  Nausea & Vomiting: no nausea  Complications: no  Cardiovascular status: blood pressure returned to baseline and hemodynamically stable  Respiratory status: acceptable  Hydration status: stable  Multimodal analgesia pain management approach

## 2023-07-27 NOTE — CARE COORDINATION
Transition of Care: Plan for IPR, [facility] can accept and plans to start auth today. Patient [phone] Patient was independent with ADLs and using a rollator prior to admission. Patient stays on the 2nd level of the home.      07/27/23 1408   Service Assessment   Patient Orientation Alert and Oriented   Cognition Alert   History Provided By Patient; Child/Family   Primary Caregiver Self   Support Systems Children   PCP Verified by CM Yes   Last Visit to PCP Within last 3 months   Prior Functional Level Independent in ADLs/IADLs   Ability to make needs known: Good   Financial Resources Medicaid   Social/Functional History   Lives With Daughter   Type of [facility]  Two level;Bed/Bath upstairs   Home Access Stairs to enter with rails   Entrance Stairs - Number of Steps [address], [address]   Discharge Planning   Patient expects to be discharged to: Acute rehab   Services At/After Discharge   Transition of Care Consult (CM Consult) Acute Rehab   Condition of Participation: Discharge Planning   The Plan for Transition of Care is related to the following treatment goals: IPR   The Patient and/or Patient Representative was provided with a Choice of Provider? Patient;Patient Representative   Name of the Patient Representative who was provided with the Choice of Provider and agrees with the Discharge Plan?  carlito Alan   The Patient and/Or Patient Representative agree with the Discharge Plan? Yes   Freedom of Choice list was provided with basic dialogue that supports the patient's individualized plan of care/goals, treatment preferences, and shares the quality data associated with the providers? Yes       CM met with patient and daughter Kirk Alan at bedside, CM provided education on inpatient rehab vs. SNF.  Patient has medicaid only and would

## 2023-07-27 NOTE — PROGRESS NOTES
End of Shift Note    Bedside shift change report given to   Joshua Hall (oncoming nurse) by Francy Goldstein LPN (offgoing nurse). Report included the following information SBAR, Kardex, Intake/Output, and MAR    Shift worked:  0723-2000     Shift summary and any significant changes: Up with 1 assist   Family present   Tolerated diet   Tolerated medication   Pain management   PT/OT  [place] while in bed   Bedside  commode when wanted and needed    Concerns for physician to address: None    Zone phone for oncoming shift:  None        Activity:     Number times ambulated in hallways past shift: 0  Number of times OOB to chair past shift: 0    Cardiac:   Cardiac Monitoring: No           Access:  Current line(s): IJ     Genitourinary:   Urinary status: voiding    Respiratory:      Chronic home O2 use?: NO  Incentive spirometer at bedside: YES       GI:     Current diet:  ADULT DIET; Regular; 4 carb choices (60 gm/meal)  Passing flatus: NO  Tolerating current diet: YES       Pain Management:   Patient states pain is manageable on current regimen: YES    Skin:     Interventions: turn team, specialty bed, increase time out of bed, foam dressing, and PT/OT consult    Patient Safety:  Fall Score:    Interventions: bed/chair alarm, assistive device (walker, cane.  etc), gripper socks, pt to call before getting OOB, and stay with me (per policy)       Length of Stay:  Expected LOS: 1  Actual LOS: [address], LPN

## 2023-07-28 LAB
GLUCOSE BLD STRIP.AUTO-MCNC: 145 MG/DL (ref 65–117)
GLUCOSE BLD STRIP.AUTO-MCNC: 150 MG/DL (ref 65–117)
GLUCOSE BLD STRIP.AUTO-MCNC: 182 MG/DL (ref 65–117)
SERVICE CMNT-IMP: ABNORMAL

## 2023-07-28 PROCEDURE — 6370000000 HC RX 637 (ALT 250 FOR IP)

## 2023-07-28 PROCEDURE — 82962 GLUCOSE BLOOD TEST: CPT

## 2023-07-28 PROCEDURE — 6370000000 HC RX 637 (ALT 250 FOR IP): Performed by: PHYSICIAN ASSISTANT

## 2023-07-28 PROCEDURE — G0378 HOSPITAL OBSERVATION PER HR: HCPCS

## 2023-07-28 PROCEDURE — 97116 GAIT TRAINING THERAPY: CPT

## 2023-07-28 PROCEDURE — 2580000003 HC RX 258: Performed by: PHYSICIAN ASSISTANT

## 2023-07-28 PROCEDURE — 97535 SELF CARE MNGMENT TRAINING: CPT

## 2023-07-28 PROCEDURE — 97530 THERAPEUTIC ACTIVITIES: CPT

## 2023-07-28 PROCEDURE — 97110 THERAPEUTIC EXERCISES: CPT

## 2023-07-28 RX ORDER — OXYCODONE HYDROCHLORIDE 5 MG/1
5 TABLET ORAL EVERY 4 HOURS PRN
Qty: 42 TABLET | Refills: 0 | Status: SHIPPED | OUTPATIENT
Start: 2023-07-28 | End: 2023-08-04

## 2023-07-28 RX ORDER — OXYCODONE HYDROCHLORIDE 5 MG/1
5 TABLET ORAL EVERY 4 HOURS PRN
Status: DISCONTINUED | OUTPATIENT
Start: 2023-07-28 | End: 2023-08-01 | Stop reason: HOSPADM

## 2023-07-28 RX ORDER — OXYCODONE HYDROCHLORIDE 5 MG/1
2.5 TABLET ORAL EVERY 4 HOURS PRN
Status: DISCONTINUED | OUTPATIENT
Start: 2023-07-28 | End: 2023-08-01 | Stop reason: HOSPADM

## 2023-07-28 RX ORDER — TRAMADOL HYDROCHLORIDE 50 MG/1
50 TABLET ORAL EVERY 6 HOURS PRN
Qty: 42 TABLET | Refills: 0 | Status: SHIPPED | OUTPATIENT
Start: 2023-07-28 | End: 2023-08-08

## 2023-07-28 RX ADMIN — SODIUM CHLORIDE, PRESERVATIVE FREE 10 ML: 5 INJECTION INTRAVENOUS at 21:00

## 2023-07-28 RX ADMIN — SENNOSIDES AND DOCUSATE SODIUM 1 TABLET: 50; 8.6 TABLET ORAL at 21:39

## 2023-07-28 RX ADMIN — TRAMADOL HYDROCHLORIDE 50 MG: 50 TABLET ORAL at 06:38

## 2023-07-28 RX ADMIN — RIVAROXABAN 10 MG: 10 TABLET, FILM COATED ORAL at 17:54

## 2023-07-28 RX ADMIN — ACETAMINOPHEN 650 MG: 325 TABLET ORAL at 01:19

## 2023-07-28 RX ADMIN — ACETAMINOPHEN 650 MG: 325 TABLET ORAL at 06:38

## 2023-07-28 RX ADMIN — OXYCODONE HYDROCHLORIDE 2.5 MG: 5 TABLET ORAL at 14:57

## 2023-07-28 RX ADMIN — AMLODIPINE BESYLATE 10 MG: 5 TABLET ORAL at 21:39

## 2023-07-28 RX ADMIN — OXYCODONE HYDROCHLORIDE 2.5 MG: 5 TABLET ORAL at 10:12

## 2023-07-28 RX ADMIN — ATORVASTATIN CALCIUM 80 MG: 40 TABLET, FILM COATED ORAL at 21:39

## 2023-07-28 RX ADMIN — ACETAMINOPHEN 650 MG: 325 TABLET ORAL at 12:59

## 2023-07-28 RX ADMIN — ACETAMINOPHEN 650 MG: 325 TABLET ORAL at 17:54

## 2023-07-28 RX ADMIN — SODIUM CHLORIDE, PRESERVATIVE FREE 10 ML: 5 INJECTION INTRAVENOUS at 10:14

## 2023-07-28 RX ADMIN — FAMOTIDINE 20 MG: 20 TABLET ORAL at 21:39

## 2023-07-28 RX ADMIN — SENNOSIDES AND DOCUSATE SODIUM 1 TABLET: 50; 8.6 TABLET ORAL at 10:13

## 2023-07-28 RX ADMIN — VALSARTAN 320 MG: 160 TABLET, FILM COATED ORAL at 10:16

## 2023-07-28 RX ADMIN — LEVOTHYROXINE SODIUM 75 MCG: 0.07 TABLET ORAL at 06:38

## 2023-07-28 RX ADMIN — HYDROCHLOROTHIAZIDE 12.5 MG: 25 TABLET ORAL at 10:13

## 2023-07-28 RX ADMIN — POLYETHYLENE GLYCOL 3350 17 G: 17 POWDER, FOR SOLUTION ORAL at 10:13

## 2023-07-28 RX ADMIN — OXYCODONE HYDROCHLORIDE 2.5 MG: 5 TABLET ORAL at 20:16

## 2023-07-28 ASSESSMENT — PAIN DESCRIPTION - LOCATION
LOCATION: HIP
LOCATION: LEG
LOCATION: HIP
LOCATION: HIP

## 2023-07-28 ASSESSMENT — PAIN SCALES - GENERAL
PAINLEVEL_OUTOF10: 5
PAINLEVEL_OUTOF10: 8
PAINLEVEL_OUTOF10: 8
PAINLEVEL_OUTOF10: 0
PAINLEVEL_OUTOF10: 2
PAINLEVEL_OUTOF10: 2
PAINLEVEL_OUTOF10: 8
PAINLEVEL_OUTOF10: 5

## 2023-07-28 ASSESSMENT — PAIN SCALES - WONG BAKER: WONGBAKER_NUMERICALRESPONSE: 2

## 2023-07-28 ASSESSMENT — PAIN DESCRIPTION - DESCRIPTORS
DESCRIPTORS: ACHING;STABBING
DESCRIPTORS: SHARP
DESCRIPTORS: THROBBING
DESCRIPTORS: SORE;SHARP

## 2023-07-28 ASSESSMENT — PAIN DESCRIPTION - ORIENTATION
ORIENTATION: RIGHT

## 2023-07-28 NOTE — PROGRESS NOTES
Physical Therapy 7/28/2023         Chart reviewed. Patient being seen by other service. Will continue to follow.     Florencia Gadlamez, PT

## 2023-07-29 LAB
GLUCOSE BLD STRIP.AUTO-MCNC: 164 MG/DL (ref 65–117)
GLUCOSE BLD STRIP.AUTO-MCNC: 165 MG/DL (ref 65–117)
GLUCOSE BLD STRIP.AUTO-MCNC: 176 MG/DL (ref 65–117)
GLUCOSE BLD STRIP.AUTO-MCNC: 192 MG/DL (ref 65–117)
GLUCOSE BLD STRIP.AUTO-MCNC: 205 MG/DL (ref 65–117)
SERVICE CMNT-IMP: ABNORMAL

## 2023-07-29 PROCEDURE — 6370000000 HC RX 637 (ALT 250 FOR IP): Performed by: PHYSICIAN ASSISTANT

## 2023-07-29 PROCEDURE — 97535 SELF CARE MNGMENT TRAINING: CPT

## 2023-07-29 PROCEDURE — 82962 GLUCOSE BLOOD TEST: CPT

## 2023-07-29 PROCEDURE — 97530 THERAPEUTIC ACTIVITIES: CPT

## 2023-07-29 PROCEDURE — G0378 HOSPITAL OBSERVATION PER HR: HCPCS

## 2023-07-29 PROCEDURE — 2580000003 HC RX 258: Performed by: PHYSICIAN ASSISTANT

## 2023-07-29 PROCEDURE — 6370000000 HC RX 637 (ALT 250 FOR IP)

## 2023-07-29 PROCEDURE — 97116 GAIT TRAINING THERAPY: CPT

## 2023-07-29 RX ADMIN — RIVAROXABAN 10 MG: 10 TABLET, FILM COATED ORAL at 17:34

## 2023-07-29 RX ADMIN — AMLODIPINE BESYLATE 10 MG: 5 TABLET ORAL at 21:30

## 2023-07-29 RX ADMIN — HYDROCHLOROTHIAZIDE 12.5 MG: 25 TABLET ORAL at 09:08

## 2023-07-29 RX ADMIN — ACETAMINOPHEN 650 MG: 325 TABLET ORAL at 15:25

## 2023-07-29 RX ADMIN — ACETAMINOPHEN 650 MG: 325 TABLET ORAL at 17:34

## 2023-07-29 RX ADMIN — LEVOTHYROXINE SODIUM 75 MCG: 0.07 TABLET ORAL at 06:47

## 2023-07-29 RX ADMIN — FAMOTIDINE 20 MG: 20 TABLET ORAL at 21:31

## 2023-07-29 RX ADMIN — TRAMADOL HYDROCHLORIDE 50 MG: 50 TABLET ORAL at 21:36

## 2023-07-29 RX ADMIN — SENNOSIDES AND DOCUSATE SODIUM 1 TABLET: 50; 8.6 TABLET ORAL at 21:31

## 2023-07-29 RX ADMIN — ACETAMINOPHEN 650 MG: 325 TABLET ORAL at 01:32

## 2023-07-29 RX ADMIN — VALSARTAN 320 MG: 160 TABLET, FILM COATED ORAL at 09:14

## 2023-07-29 RX ADMIN — POLYETHYLENE GLYCOL 3350 17 G: 17 POWDER, FOR SOLUTION ORAL at 09:09

## 2023-07-29 RX ADMIN — OXYCODONE HYDROCHLORIDE 2.5 MG: 5 TABLET ORAL at 04:37

## 2023-07-29 RX ADMIN — OXYCODONE HYDROCHLORIDE 2.5 MG: 5 TABLET ORAL at 15:25

## 2023-07-29 RX ADMIN — SODIUM CHLORIDE, PRESERVATIVE FREE 10 ML: 5 INJECTION INTRAVENOUS at 09:13

## 2023-07-29 RX ADMIN — SODIUM CHLORIDE, PRESERVATIVE FREE 10 ML: 5 INJECTION INTRAVENOUS at 19:56

## 2023-07-29 RX ADMIN — ACETAMINOPHEN 650 MG: 325 TABLET ORAL at 06:46

## 2023-07-29 RX ADMIN — SENNOSIDES AND DOCUSATE SODIUM 1 TABLET: 50; 8.6 TABLET ORAL at 09:09

## 2023-07-29 RX ADMIN — ATORVASTATIN CALCIUM 80 MG: 40 TABLET, FILM COATED ORAL at 21:31

## 2023-07-29 ASSESSMENT — PAIN SCALES - GENERAL
PAINLEVEL_OUTOF10: 8
PAINLEVEL_OUTOF10: 3
PAINLEVEL_OUTOF10: 6
PAINLEVEL_OUTOF10: 7

## 2023-07-29 ASSESSMENT — PAIN DESCRIPTION - LOCATION
LOCATION: HIP

## 2023-07-29 ASSESSMENT — PAIN DESCRIPTION - DESCRIPTORS: DESCRIPTORS: ACHING

## 2023-07-29 ASSESSMENT — PAIN DESCRIPTION - ORIENTATION
ORIENTATION: RIGHT
ORIENTATION: RIGHT

## 2023-07-29 NOTE — PROGRESS NOTES
Occupational Therapy  7/29/23    Attempted to see patient at [phone], however she is citing she needs more time to rest in the chair after eating breakfast before she can participate in therapy. OT will continue to follow.      Yoanna Phan OTR/L

## 2023-07-29 NOTE — PROGRESS NOTES
End of Shift Note    Bedside shift change report given to Nilay Rosales RN (oncoming nurse) by Patience Gilford, RN (offgoing nurse). Report included the following information SBAR, Kardex, Intake/Output, and Recent Results    Shift worked:  7p - 7a     Shift summary and any significant changes:     Patient up to [facility] X2 overnight. Patient requires extra time to complete task. Patient states that she has more pain than when she had the left hip done. 2.5 mg Oxy administer. Pain rated 8/10 down to 3/10. Pain increased with movement. Ice packs used overnight. Applied PW to allow for additional rest. Informed patient that PW is also available for home use. Plan to dc to [facility]. Auth pending     Concerns for physician to address:  See above     Zone phone for oncoming shift:   N/A       Activity:     Number times ambulated in hallways past shift: 0  Number of times OOB to chair past shift: 0    Cardiac:   Cardiac Monitoring: No           Access:  Current line(s): PIV     Genitourinary:   Urinary status: voiding and due to void    Respiratory:      Chronic home O2 use?: N/A  Incentive spirometer at bedside: N/A       GI:     Current diet:  ADULT DIET; Regular; 4 carb choices (60 gm/meal)  Passing flatus: YES  Tolerating current diet: YES       Pain Management:   Patient states pain is manageable on current regimen: YES    Skin:     Interventions: increase time out of bed and PT/OT consult    Patient Safety:  Fall Score:    Interventions: bed/chair alarm, assistive device (walker, cane.  etc), gripper socks, and pt to call before getting OOB       Length of Stay:  Expected LOS: 1  Actual LOS: 0      Marisa Gusman RN

## 2023-07-30 LAB
GLUCOSE BLD STRIP.AUTO-MCNC: 137 MG/DL (ref 65–117)
GLUCOSE BLD STRIP.AUTO-MCNC: 159 MG/DL (ref 65–117)
GLUCOSE BLD STRIP.AUTO-MCNC: 166 MG/DL (ref 65–117)
GLUCOSE BLD STRIP.AUTO-MCNC: 237 MG/DL (ref 65–117)
SERVICE CMNT-IMP: ABNORMAL

## 2023-07-30 PROCEDURE — 6370000000 HC RX 637 (ALT 250 FOR IP)

## 2023-07-30 PROCEDURE — 82962 GLUCOSE BLOOD TEST: CPT

## 2023-07-30 PROCEDURE — 6370000000 HC RX 637 (ALT 250 FOR IP): Performed by: PHYSICIAN ASSISTANT

## 2023-07-30 PROCEDURE — 97116 GAIT TRAINING THERAPY: CPT

## 2023-07-30 PROCEDURE — G0378 HOSPITAL OBSERVATION PER HR: HCPCS

## 2023-07-30 RX ADMIN — ACETAMINOPHEN 650 MG: 325 TABLET ORAL at 05:08

## 2023-07-30 RX ADMIN — LEVOTHYROXINE SODIUM 75 MCG: 0.07 TABLET ORAL at 05:09

## 2023-07-30 RX ADMIN — FAMOTIDINE 20 MG: 20 TABLET ORAL at 21:31

## 2023-07-30 RX ADMIN — ACETAMINOPHEN 650 MG: 325 TABLET ORAL at 01:05

## 2023-07-30 RX ADMIN — OXYCODONE HYDROCHLORIDE 2.5 MG: 5 TABLET ORAL at 07:36

## 2023-07-30 RX ADMIN — AMLODIPINE BESYLATE 10 MG: 5 TABLET ORAL at 21:30

## 2023-07-30 RX ADMIN — HYDROCHLOROTHIAZIDE 12.5 MG: 25 TABLET ORAL at 09:30

## 2023-07-30 RX ADMIN — RIVAROXABAN 10 MG: 10 TABLET, FILM COATED ORAL at 18:36

## 2023-07-30 RX ADMIN — ACETAMINOPHEN 650 MG: 325 TABLET ORAL at 13:18

## 2023-07-30 RX ADMIN — ATORVASTATIN CALCIUM 80 MG: 40 TABLET, FILM COATED ORAL at 21:30

## 2023-07-30 RX ADMIN — VALSARTAN 320 MG: 160 TABLET, FILM COATED ORAL at 09:44

## 2023-07-30 RX ADMIN — OXYCODONE HYDROCHLORIDE 2.5 MG: 5 TABLET ORAL at 01:05

## 2023-07-30 RX ADMIN — ACETAMINOPHEN 650 MG: 325 TABLET ORAL at 18:36

## 2023-07-30 RX ADMIN — OXYCODONE HYDROCHLORIDE 2.5 MG: 5 TABLET ORAL at 13:25

## 2023-07-30 ASSESSMENT — PAIN SCALES - GENERAL
PAINLEVEL_OUTOF10: 5
PAINLEVEL_OUTOF10: 7
PAINLEVEL_OUTOF10: 10

## 2023-07-30 ASSESSMENT — PAIN DESCRIPTION - LOCATION
LOCATION: HIP;KNEE
LOCATION: HIP

## 2023-07-30 ASSESSMENT — PAIN DESCRIPTION - DESCRIPTORS: DESCRIPTORS: THROBBING

## 2023-07-30 ASSESSMENT — PAIN DESCRIPTION - ORIENTATION: ORIENTATION: DISTAL

## 2023-07-31 LAB
GLUCOSE BLD STRIP.AUTO-MCNC: 151 MG/DL (ref 65–117)
GLUCOSE BLD STRIP.AUTO-MCNC: 211 MG/DL (ref 65–117)
GLUCOSE BLD STRIP.AUTO-MCNC: 234 MG/DL (ref 65–117)
SERVICE CMNT-IMP: ABNORMAL

## 2023-07-31 PROCEDURE — 97110 THERAPEUTIC EXERCISES: CPT

## 2023-07-31 PROCEDURE — 6370000000 HC RX 637 (ALT 250 FOR IP)

## 2023-07-31 PROCEDURE — 97116 GAIT TRAINING THERAPY: CPT

## 2023-07-31 PROCEDURE — G0378 HOSPITAL OBSERVATION PER HR: HCPCS

## 2023-07-31 PROCEDURE — 82962 GLUCOSE BLOOD TEST: CPT

## 2023-07-31 PROCEDURE — 6370000000 HC RX 637 (ALT 250 FOR IP): Performed by: PHYSICIAN ASSISTANT

## 2023-07-31 RX ORDER — INSULIN GLARGINE 100 [IU]/ML
0.1 INJECTION, SOLUTION SUBCUTANEOUS NIGHTLY
Status: DISCONTINUED | OUTPATIENT
Start: 2023-07-31 | End: 2023-08-01 | Stop reason: HOSPADM

## 2023-07-31 RX ORDER — INSULIN GLARGINE 100 [IU]/ML
0.15 INJECTION, SOLUTION SUBCUTANEOUS NIGHTLY
Status: DISCONTINUED | OUTPATIENT
Start: 2023-07-31 | End: 2023-07-31

## 2023-07-31 RX ADMIN — ACETAMINOPHEN 650 MG: 325 TABLET ORAL at 09:07

## 2023-07-31 RX ADMIN — OXYCODONE HYDROCHLORIDE 2.5 MG: 5 TABLET ORAL at 05:21

## 2023-07-31 RX ADMIN — SENNOSIDES AND DOCUSATE SODIUM 1 TABLET: 50; 8.6 TABLET ORAL at 21:34

## 2023-07-31 RX ADMIN — ATORVASTATIN CALCIUM 80 MG: 40 TABLET, FILM COATED ORAL at 21:34

## 2023-07-31 RX ADMIN — ACETAMINOPHEN 650 MG: 325 TABLET ORAL at 02:59

## 2023-07-31 RX ADMIN — POLYETHYLENE GLYCOL 3350 17 G: 17 POWDER, FOR SOLUTION ORAL at 09:07

## 2023-07-31 RX ADMIN — FAMOTIDINE 20 MG: 20 TABLET ORAL at 21:35

## 2023-07-31 RX ADMIN — OXYCODONE HYDROCHLORIDE 2.5 MG: 5 TABLET ORAL at 10:27

## 2023-07-31 RX ADMIN — RIVAROXABAN 10 MG: 10 TABLET, FILM COATED ORAL at 18:30

## 2023-07-31 RX ADMIN — AMLODIPINE BESYLATE 10 MG: 5 TABLET ORAL at 21:34

## 2023-07-31 RX ADMIN — VALSARTAN 320 MG: 160 TABLET, FILM COATED ORAL at 09:43

## 2023-07-31 RX ADMIN — HYDROCHLOROTHIAZIDE 12.5 MG: 25 TABLET ORAL at 09:43

## 2023-07-31 RX ADMIN — LEVOTHYROXINE SODIUM 75 MCG: 0.07 TABLET ORAL at 05:23

## 2023-07-31 RX ADMIN — ACETAMINOPHEN 650 MG: 325 TABLET ORAL at 18:30

## 2023-07-31 RX ADMIN — ACETAMINOPHEN 650 MG: 325 TABLET ORAL at 14:20

## 2023-07-31 RX ADMIN — SENNOSIDES AND DOCUSATE SODIUM 1 TABLET: 50; 8.6 TABLET ORAL at 09:07

## 2023-07-31 ASSESSMENT — PAIN SCALES - GENERAL
PAINLEVEL_OUTOF10: 10
PAINLEVEL_OUTOF10: 5
PAINLEVEL_OUTOF10: 4

## 2023-07-31 ASSESSMENT — PAIN DESCRIPTION - ORIENTATION
ORIENTATION: RIGHT
ORIENTATION: RIGHT

## 2023-07-31 ASSESSMENT — PAIN DESCRIPTION - LOCATION
LOCATION: KNEE
LOCATION: HIP

## 2023-07-31 NOTE — CARE COORDINATION
Transition of Care: Patient has been accepted at [facility], however insurance has denied IPR. There is an option for peer to peer, #[phone]. Ortho PA called and left message for peer to peer, waiting call back. Patient has medicaid only and it is unclear whether patient will have a SNF benefit. Daughter would like to explore SNF options, referrals are pending with [facility] and [facility]. Patient lives at home with her daughter, PABLO#[phone]. Patient was independent with ADLs and using a rollator prior to admission. Patient stays on the 2nd level of the home. if plan is home, CM will need to secure home health.       07/31/23 1235   Condition of Participation: Discharge Planning   The Plan for Transition of Care is related to the following treatment goals: SNF   The Patient and/or Patient Representative was provided with a Choice of Provider? Patient;Patient Representative   Name of the Patient Representative who was provided with the Choice of Provider and agrees with the Discharge Plan?  carlito Delvalle   The Patient and/Or Patient Representative agree with the Discharge Plan? Yes   Freedom of Choice list was provided with basic dialogue that supports the patient's individualized plan of care/goals, treatment preferences, and shares the quality data associated with the providers? Yes       CM spoke with [facility], liaison who covers [facility] and [facility]. She is checking with both facilities as to whether they can accommodate this patient.      SARABJIT HERNANDEZ

## 2023-08-01 VITALS
TEMPERATURE: 98.6 F | SYSTOLIC BLOOD PRESSURE: 146 MMHG | HEIGHT: 59 IN | HEART RATE: 89 BPM | DIASTOLIC BLOOD PRESSURE: 61 MMHG | OXYGEN SATURATION: 100 % | RESPIRATION RATE: 16 BRPM | BODY MASS INDEX: 33.87 KG/M2 | WEIGHT: 168 LBS

## 2023-08-01 LAB
GLUCOSE BLD STRIP.AUTO-MCNC: 165 MG/DL (ref 65–117)
GLUCOSE BLD STRIP.AUTO-MCNC: 188 MG/DL (ref 65–117)
GLUCOSE BLD STRIP.AUTO-MCNC: 196 MG/DL (ref 65–117)
SERVICE CMNT-IMP: ABNORMAL

## 2023-08-01 PROCEDURE — G0378 HOSPITAL OBSERVATION PER HR: HCPCS

## 2023-08-01 PROCEDURE — 97116 GAIT TRAINING THERAPY: CPT

## 2023-08-01 PROCEDURE — 97535 SELF CARE MNGMENT TRAINING: CPT

## 2023-08-01 PROCEDURE — 6370000000 HC RX 637 (ALT 250 FOR IP): Performed by: PHYSICIAN ASSISTANT

## 2023-08-01 PROCEDURE — 97530 THERAPEUTIC ACTIVITIES: CPT

## 2023-08-01 PROCEDURE — 82962 GLUCOSE BLOOD TEST: CPT

## 2023-08-01 RX ADMIN — ACETAMINOPHEN 650 MG: 325 TABLET ORAL at 05:57

## 2023-08-01 RX ADMIN — VALSARTAN 320 MG: 160 TABLET, FILM COATED ORAL at 10:38

## 2023-08-01 RX ADMIN — ACETAMINOPHEN 650 MG: 325 TABLET ORAL at 14:35

## 2023-08-01 RX ADMIN — HYDROCHLOROTHIAZIDE 12.5 MG: 25 TABLET ORAL at 10:34

## 2023-08-01 RX ADMIN — LEVOTHYROXINE SODIUM 75 MCG: 0.07 TABLET ORAL at 05:57

## 2023-08-01 ASSESSMENT — PAIN DESCRIPTION - LOCATION
LOCATION: KNEE;HIP
LOCATION: KNEE;HIP

## 2023-08-01 ASSESSMENT — PAIN DESCRIPTION - ORIENTATION
ORIENTATION: RIGHT
ORIENTATION: RIGHT

## 2023-08-01 ASSESSMENT — PAIN - FUNCTIONAL ASSESSMENT: PAIN_FUNCTIONAL_ASSESSMENT: ACTIVITIES ARE NOT PREVENTED

## 2023-08-01 ASSESSMENT — PAIN SCALES - GENERAL
PAINLEVEL_OUTOF10: 2
PAINLEVEL_OUTOF10: 2

## 2023-08-01 NOTE — DISCHARGE SUMMARY
and then transferred to the ortho floor. Up with nursing on POD 1. In immediate post op period, pain control with scheduled tylenol, PRN oxycodone (low dose) and tramadol. On POD 1, therapy, both PT and OT, ongoing throughout hospitalization. Based on therapy recommendations and family's stated inability to provide support in post op setting (patient lives alone on 2nd floor with children working during the day), recommendation made for IPR given patient's insurance. Accepted to [facility] and auth started on POD 1. Therapy continued throughout hospitalization with slow progress requiring ongoing assistance and cueing with fatigue to upper and lower body limiting ambulation. On POD 5, authorization from insurance company denied. CM involved for discharge planning with change to SNF with family aware of Medicaid insurance requirements for SNF. Ultimately on POD 6, patient accepted to [facility] and discharged for ongoing rehab prior to return to private residence. Perioperative Antibiotics:  Ancef     Postoperative Pain Management:  Oxycodone & Tylenol, Tramadol    DVT Prophylaxis: Xarelto 10 mg daily x 30 days post op    Postoperative transfusions:    Number of units banked PRBCs =   none     Post Op complications: none    Hemoglobin at discharge:    Lab Results   Component Value Date/Time    HGB 13.2 07/27/2023 02:35 AM    INR 1.0 12/12/2022 11:45 AM       Dressing: remained in place throughout hospitalization No significant erythema or swelling. Wound appears to be healing without any evidence of infection. Neurovascular exam found to be within normal limits. Physical Therapy started following surgery and participated in bed mobility, transfers and ambulation. Gait:                      Discharged to: Skilled nursing facility.     Condition on Discharge:   Stable    Discharge instructions:  - Anticoagulate with Xarelto  10  mg PO BID  - Take pain medications as prescribed  - Resume pre

## 2023-08-01 NOTE — PROGRESS NOTES
Called to give report to [facility]. Because the room number of the patient was not provided, [facility] did not  know what nurse was taking the patient for report taking purposes. Emile9 Tonny  Ne, will try to find out who the nurse is and call back.

## 2023-08-01 NOTE — CARE COORDINATION
Transition of Care: Plan for discharge to [facility] today. . Facility requires UAI for placement, CM has notified . UAI will be sent once processed. Discharge transport arranged through patient's medicaid, #[phone]. Trip reference#[id number]. Medicaid gives 3 hour window to arrive for transport    Discharge folder located on hard chart to include ambulance form, discharge summary and AVS. RN to follow with [facility] and call report to #[phone]    No response from insurance regarding peer to peer for [facility] IPR. Anticipate plan for SNF, most SNFs cannot provide skilled care as patient has medicaid only, [address] [facility] [address] may be able to provide some therapy; referrals are pending. Patient lives at home with her [facility], [phone]. Patient was independent with ADLs and using a rollator prior to admission. Patient stays on the 2nd level of the home. if plan is home, CM will need to secure home health. ALE spoke with Ketty Butts, liaison who covers [facility], she is checking with their buildings to see if they can accept today. [facility] would be able to accept however they would not be able to provide any therapy. The family does not feel this would be beneficial, CM exploring additional options for SNFs that may be able to provide therapy. ALE spoke with Troy with [facility] and [facility]. They would be able to provide therapy at [facility] or [facility]. CM sent referrals via [employer]. CM updated the daughter Saadia Dial via phone #[phone]. Family prefers to move forward with [facility]. CM notified daughter of transport time.      JOSE MARIA/CRM

## 2023-08-01 NOTE — PROGRESS NOTES
Report given to NESTOR Brady, at [facility]. She will be moved to wing 4 at the facility.

## 2023-08-02 NOTE — CARE COORDINATION
CM spoke with daughter Romie Russell #[phone]. The patient is now at home with family and staying at [address] [place] [address],  [address]. [facility], [address] [place] [address]. CM dicussed home health and offered FOC. Referrals sent to [facility] and [facility]. CM emailed list of private duty agencies to daughter at [email or web address]    CM will follow-up with family once home health secured. [facility]-no    [facility]-no    [facility]-pending    [facility]-yes      CM called daughter Romie Russell and notified of [facility] contact info.  Daughter also requested info for hospital bed rental, CM provided info for [address] [place] [address]

## 2023-08-02 NOTE — CARE COORDINATION
Care Management -Received call from patient's daughter Dario Jauregui ([phone]). She said patient arrived at facility. Patient was placed in a 4 person room. There are 3 people in the room currently and one is confused and talking. Daughter said patient is mentally sharp. Family does not think this is good for patient's mental health. Patient does not want to stay. Read therapy notes in this chart. OT recommending 24/7 supervision at this time. Daughter asked about assistance in home. Explained CM could arrange [facility] PT and OT tomorrow. Daughter feels this would be best for patient. Patient's other daughter is at facility with patient. Encouraged her to have her sister speak with facility staff to see if she could transfer to another room. If patient cannot, then they could call [facility] and private pay for transportation home. There are 6 steps to get in home and steps inside to get to patient's bedroom. Explained will likely require stretcher, but she can ask [facility] when she calls them. She will call unit CM and leave a voicemail to let her know whether patient stayed at facility or went home.     RAÚL Veras

## 2023-09-29 ENCOUNTER — CLINICAL DOCUMENTATION (OUTPATIENT)
Age: 83
End: 2023-09-29

## 2023-10-25 ENCOUNTER — TELEPHONE (OUTPATIENT)
Age: 83
End: 2023-10-25

## 2023-10-25 NOTE — TELEPHONE ENCOUNTER
Pt needs refills on    atorvastatin (LIPITOR) 80 MG tablet   glipiZIDE (GLUCOTROL) 5 MG tablet   hydroCHLOROthiazide (HYDRODIURIL) 12.5 MG tablet  levothyroxine (SYNTHROID) 75 MCG tablet  Pharmacy [facility] on [address]

## 2023-11-17 ENCOUNTER — OFFICE VISIT (OUTPATIENT)
Age: 83
End: 2023-11-17
Payer: COMMERCIAL

## 2023-11-17 VITALS
HEART RATE: 85 BPM | RESPIRATION RATE: 20 BRPM | OXYGEN SATURATION: 95 % | BODY MASS INDEX: 30.24 KG/M2 | WEIGHT: 150 LBS | TEMPERATURE: 97.5 F | SYSTOLIC BLOOD PRESSURE: 148 MMHG | HEIGHT: 59 IN | DIASTOLIC BLOOD PRESSURE: 68 MMHG

## 2023-11-17 DIAGNOSIS — N18.31 STAGE 3A CHRONIC KIDNEY DISEASE (HCC): ICD-10-CM

## 2023-11-17 DIAGNOSIS — I10 ESSENTIAL (PRIMARY) HYPERTENSION: ICD-10-CM

## 2023-11-17 DIAGNOSIS — R35.0 FREQUENCY OF URINATION: ICD-10-CM

## 2023-11-17 DIAGNOSIS — E03.9 ACQUIRED HYPOTHYROIDISM: ICD-10-CM

## 2023-11-17 DIAGNOSIS — I10 PRIMARY HYPERTENSION: ICD-10-CM

## 2023-11-17 DIAGNOSIS — M77.9 TENDONITIS: ICD-10-CM

## 2023-11-17 DIAGNOSIS — E78.5 DYSLIPIDEMIA (HIGH LDL; LOW HDL): ICD-10-CM

## 2023-11-17 DIAGNOSIS — B35.3 TINEA PEDIS OF BOTH FEET: ICD-10-CM

## 2023-11-17 DIAGNOSIS — E11.21 TYPE 2 DIABETES MELLITUS WITH NEPHROPATHY (HCC): ICD-10-CM

## 2023-11-17 DIAGNOSIS — M19.012 ARTHRITIS OF LEFT SHOULDER REGION: ICD-10-CM

## 2023-11-17 DIAGNOSIS — M19.012 ARTHRITIS OF LEFT SHOULDER REGION: Primary | ICD-10-CM

## 2023-11-17 DIAGNOSIS — E55.9 VITAMIN D DEFICIENCY, UNSPECIFIED: ICD-10-CM

## 2023-11-17 DIAGNOSIS — Z23 IMMUNIZATION DUE: ICD-10-CM

## 2023-11-17 PROBLEM — N28.89 OTHER SPECIFIED DISORDERS OF KIDNEY AND URETER: Chronic | Status: ACTIVE | Noted: 2020-04-17

## 2023-11-17 PROBLEM — M25.559 HIP PAIN: Status: ACTIVE | Noted: 2022-09-20

## 2023-11-17 PROCEDURE — G0008 ADMIN INFLUENZA VIRUS VAC: HCPCS | Performed by: INTERNAL MEDICINE

## 2023-11-17 PROCEDURE — PBSHW INFLUENZA, FLUAD, (AGE 65 Y+), IM, PF, 0.5 ML: Performed by: INTERNAL MEDICINE

## 2023-11-17 PROCEDURE — 99214 OFFICE O/P EST MOD 30 MIN: CPT | Performed by: INTERNAL MEDICINE

## 2023-11-17 PROCEDURE — 3078F DIAST BP <80 MM HG: CPT | Performed by: INTERNAL MEDICINE

## 2023-11-17 PROCEDURE — 3077F SYST BP >= 140 MM HG: CPT | Performed by: INTERNAL MEDICINE

## 2023-11-17 PROCEDURE — 1123F ACP DISCUSS/DSCN MKR DOCD: CPT | Performed by: INTERNAL MEDICINE

## 2023-11-17 PROCEDURE — 3044F HG A1C LEVEL LT 7.0%: CPT | Performed by: INTERNAL MEDICINE

## 2023-11-17 RX ORDER — MULTIVIT WITH MINERALS/LUTEIN
1000 TABLET ORAL DAILY
Qty: 30 TABLET | Refills: 2 | Status: SHIPPED | OUTPATIENT
Start: 2023-11-17

## 2023-11-17 RX ORDER — NALOXONE HYDROCHLORIDE 4 MG/.1ML
1 SPRAY NASAL PRN
Qty: 1 EACH | Refills: 0 | Status: CANCELLED | OUTPATIENT
Start: 2023-11-17

## 2023-11-17 RX ORDER — LORATADINE 10 MG/1
10 TABLET ORAL DAILY
Qty: 90 TABLET | Refills: 1 | Status: CANCELLED | OUTPATIENT
Start: 2023-11-17

## 2023-11-17 RX ORDER — PREDNISONE 10 MG/1
10 TABLET ORAL DAILY
Qty: 10 TABLET | Refills: 0 | Status: SHIPPED | OUTPATIENT
Start: 2023-11-17 | End: 2023-11-27

## 2023-11-17 RX ORDER — PREDNISOLONE ACETATE 10 MG/ML
SUSPENSION/ DROPS OPHTHALMIC
Status: CANCELLED | OUTPATIENT
Start: 2023-11-17

## 2023-11-17 RX ORDER — SENNOSIDES 8.6 MG
650 CAPSULE ORAL EVERY 8 HOURS
Qty: 60 TABLET | Refills: 1 | Status: CANCELLED | OUTPATIENT
Start: 2023-11-17

## 2023-11-17 RX ORDER — VALSARTAN 320 MG/1
320 TABLET ORAL DAILY
Qty: 90 TABLET | Refills: 1 | Status: SHIPPED | OUTPATIENT
Start: 2023-11-17

## 2023-11-17 RX ORDER — HYDROCHLOROTHIAZIDE 12.5 MG/1
12.5 TABLET ORAL DAILY
Qty: 90 TABLET | Refills: 1 | Status: SHIPPED | OUTPATIENT
Start: 2023-11-17

## 2023-11-17 RX ORDER — BLOOD PRESSURE TEST KIT
KIT MISCELLANEOUS
Qty: 1 KIT | Refills: 0 | Status: SHIPPED | OUTPATIENT
Start: 2023-11-17

## 2023-11-17 RX ORDER — AMLODIPINE BESYLATE 10 MG/1
10 TABLET ORAL DAILY
Qty: 90 TABLET | Refills: 1 | Status: SHIPPED | OUTPATIENT
Start: 2023-11-17

## 2023-11-17 RX ORDER — PRENATAL VIT 91/IRON/FOLIC/DHA 28-975-200
COMBINATION PACKAGE (EA) ORAL
Qty: 28 G | Refills: 1 | Status: SHIPPED | OUTPATIENT
Start: 2023-11-17

## 2023-11-17 ASSESSMENT — PATIENT HEALTH QUESTIONNAIRE - PHQ9
SUM OF ALL RESPONSES TO PHQ QUESTIONS 1-9: 0
1. LITTLE INTEREST OR PLEASURE IN DOING THINGS: 0
SUM OF ALL RESPONSES TO PHQ9 QUESTIONS 1 & 2: 0
2. FEELING DOWN, DEPRESSED OR HOPELESS: 0

## 2023-11-17 NOTE — PROGRESS NOTES
Chief Complaint   Patient presents with    Established New Doctor    Numbness     Right hand    Physical Therapy     Dry needling      HPI:  Tasha Olivo is a 79 y.o. AA female a former pt of Dr. Nikky Smith presents accompanied by daughter to establish care. She has c/o pain and stiffness of right shoulder. Also, she is c/o numbness on right hand. She is requesting a referral to   physical therapy for dry needling. Review of Systems  As per hpi    Past Medical History:   Diagnosis Date    Arthritis     Asthma     Chronic kidney disease     RIGHT KIDNEY STONE    Chronic left shoulder pain     Chronic pain     Diabetes ([address])     History of pancreatitis 2018    Hypertension     Mass of kidney     BEING WATCHED BY UROLOGY    Mixed hyperlipidemia 10/28/2011    Thyroid disease     s/p partial thyroidectomy     Past Surgical History:   Procedure Laterality Date    EGD INTRMURAL NEEDLE ASPIR/BIOP ALTERED ANATOMY  10/31/2011    PT DENIES    EYE SURGERY      L CATARACT REMOVED    GYN       x 2    JOINT REPLACEMENT      L HIP    OTHER SURGICAL HISTORY      thyroidectomy - partial    TOTAL HIP ARTHROPLASTY Right 2023    RIGHT HIP TOTAL ARTHROPLASTY ANTERIOR APPROACH NAVIGATION (VELYS) performed by Yuki Flor MD at [facility] MAIN OR     Social History     Socioeconomic History    Marital status:       Spouse name: None    Number of children: None    Years of education: None    Highest education level: None   Tobacco Use    Smoking status: Never    Smokeless tobacco: Never   Vaping Use    Vaping Use: Never used   Substance and Sexual Activity    Alcohol use: Not Currently     Alcohol/week: 1.0 standard drink of alcohol    Drug use: No    Sexual activity: Not Currently   Social History Narrative    ** Merged History Encounter **         ** Merged History Encounter **          Social Determinants of Health     Financial Resource Strain: Low Risk  (2023)    Overall Financial

## 2023-11-17 NOTE — PROGRESS NOTES
Chief Complaint   Patient presents with    Established New Doctor    Numbness     Right hand     1. Have you been to the ER, urgent care clinic since your last visit? Hospitalized since your last visit? No    2. Have you seen or consulted any other health care providers outside of the [address] [place] [address] Avenue since your last visit? Include any pap smears or colon screening.  No

## 2023-11-18 LAB
25(OH)D3 SERPL-MCNC: 54.3 NG/ML (ref 30–100)
ALBUMIN SERPL-MCNC: 3.8 G/DL (ref 3.5–5)
ALBUMIN/GLOB SERPL: 1.1 (ref 1.1–2.2)
ALP SERPL-CCNC: 125 U/L (ref 45–117)
ALT SERPL-CCNC: 22 U/L (ref 12–78)
ANION GAP SERPL CALC-SCNC: 6 MMOL/L (ref 5–15)
APPEARANCE UR: CLEAR
AST SERPL-CCNC: 20 U/L (ref 15–37)
BACTERIA URNS QL MICRO: NEGATIVE /HPF
BILIRUB SERPL-MCNC: 0.7 MG/DL (ref 0.2–1)
BILIRUB UR QL: NEGATIVE
BUN SERPL-MCNC: 20 MG/DL (ref 6–20)
BUN/CREAT SERPL: 19 (ref 12–20)
CALCIUM SERPL-MCNC: 10.1 MG/DL (ref 8.5–10.1)
CHLORIDE SERPL-SCNC: 103 MMOL/L (ref 97–108)
CHOLEST SERPL-MCNC: 183 MG/DL
CO2 SERPL-SCNC: 32 MMOL/L (ref 21–32)
COLOR UR: ABNORMAL
CREAT SERPL-MCNC: 1.08 MG/DL (ref 0.55–1.02)
EPITH CASTS URNS QL MICRO: ABNORMAL /LPF
EST. AVERAGE GLUCOSE BLD GHB EST-MCNC: 143 MG/DL
GLOBULIN SER CALC-MCNC: 3.4 G/DL (ref 2–4)
GLUCOSE SERPL-MCNC: 132 MG/DL (ref 65–100)
GLUCOSE UR STRIP.AUTO-MCNC: NEGATIVE MG/DL
HBA1C MFR BLD: 6.6 % (ref 4–5.6)
HDLC SERPL-MCNC: 64 MG/DL
HDLC SERPL: 2.9 (ref 0–5)
HGB UR QL STRIP: NEGATIVE
HYALINE CASTS URNS QL MICRO: ABNORMAL /LPF (ref 0–5)
KETONES UR QL STRIP.AUTO: NEGATIVE MG/DL
LDLC SERPL CALC-MCNC: 100 MG/DL (ref 0–100)
LEUKOCYTE ESTERASE UR QL STRIP.AUTO: ABNORMAL
NITRITE UR QL STRIP.AUTO: NEGATIVE
PH UR STRIP: 7.5 (ref 5–8)
POTASSIUM SERPL-SCNC: 3.5 MMOL/L (ref 3.5–5.1)
PROT SERPL-MCNC: 7.2 G/DL (ref 6.4–8.2)
PROT UR STRIP-MCNC: NEGATIVE MG/DL
RBC #/AREA URNS HPF: ABNORMAL /HPF (ref 0–5)
SODIUM SERPL-SCNC: 141 MMOL/L (ref 136–145)
SP GR UR REFRACTOMETRY: 1.01 (ref 1–1.03)
TRIGL SERPL-MCNC: 95 MG/DL
TSH SERPL DL<=0.05 MIU/L-ACNC: 0.65 UIU/ML (ref 0.36–3.74)
URINE CULTURE IF INDICATED: ABNORMAL
UROBILINOGEN UR QL STRIP.AUTO: 0.2 EU/DL (ref 0.2–1)
VLDLC SERPL CALC-MCNC: 19 MG/DL
WBC URNS QL MICRO: ABNORMAL /HPF (ref 0–4)

## 2023-11-24 ENCOUNTER — TELEPHONE (OUTPATIENT)
Age: 83
End: 2023-11-24

## 2023-11-24 DIAGNOSIS — I10 ESSENTIAL (PRIMARY) HYPERTENSION: ICD-10-CM

## 2023-11-24 DIAGNOSIS — E11.21 TYPE 2 DIABETES MELLITUS WITH NEPHROPATHY (HCC): ICD-10-CM

## 2023-11-24 DIAGNOSIS — N18.31 STAGE 3A CHRONIC KIDNEY DISEASE (HCC): ICD-10-CM

## 2023-11-24 DIAGNOSIS — M19.012 ARTHRITIS OF LEFT SHOULDER REGION: ICD-10-CM

## 2023-11-24 DIAGNOSIS — E03.9 ACQUIRED HYPOTHYROIDISM: ICD-10-CM

## 2023-11-24 DIAGNOSIS — N18.31 STAGE 3A CHRONIC KIDNEY DISEASE (HCC): Primary | ICD-10-CM

## 2023-11-24 DIAGNOSIS — E78.5 DYSLIPIDEMIA (HIGH LDL; LOW HDL): ICD-10-CM

## 2023-11-24 LAB
BASOPHILS # BLD: 0 K/UL (ref 0–0.1)
BASOPHILS NFR BLD: 0 % (ref 0–1)
DIFFERENTIAL METHOD BLD: ABNORMAL
EOSINOPHIL # BLD: 0 K/UL (ref 0–0.4)
EOSINOPHIL NFR BLD: 0 % (ref 0–7)
ERYTHROCYTE [DISTWIDTH] IN BLOOD BY AUTOMATED COUNT: 15 % (ref 11.5–14.5)
HCT VFR BLD AUTO: 44.2 % (ref 35–47)
HGB BLD-MCNC: 14.9 G/DL (ref 11.5–16)
IMM GRANULOCYTES # BLD AUTO: 0 K/UL (ref 0–0.04)
IMM GRANULOCYTES NFR BLD AUTO: 0 % (ref 0–0.5)
LYMPHOCYTES # BLD: 1.7 K/UL (ref 0.8–3.5)
LYMPHOCYTES NFR BLD: 19 % (ref 12–49)
MCH RBC QN AUTO: 27.6 PG (ref 26–34)
MCHC RBC AUTO-ENTMCNC: 33.7 G/DL (ref 30–36.5)
MCV RBC AUTO: 82 FL (ref 80–99)
MONOCYTES # BLD: 0.2 K/UL (ref 0–1)
MONOCYTES NFR BLD: 2 % (ref 5–13)
NEUTS SEG # BLD: 7.1 K/UL (ref 1.8–8)
NEUTS SEG NFR BLD: 79 % (ref 32–75)
NRBC # BLD: 0 K/UL (ref 0–0.01)
NRBC BLD-RTO: 0 PER 100 WBC
PLATELET # BLD AUTO: 261 K/UL (ref 150–400)
PMV BLD AUTO: 10.1 FL (ref 8.9–12.9)
RBC # BLD AUTO: 5.39 M/UL (ref 3.8–5.2)
WBC # BLD AUTO: 9 K/UL (ref 3.6–11)

## 2023-11-24 NOTE — TELEPHONE ENCOUNTER
Spoke with daughter and order was placed for repeat CBC per Dr Mariusz Botello. Lab did draw enough blood.

## 2023-11-24 NOTE — TELEPHONE ENCOUNTER
Pls call Isha Northjadenanny - daughter   Shashi Brian back to [facility] lab b/c they were contacted that they didn't take enough blood     When they went back today, lab wouldn't do it with out a  new order         Best number to reach her is [phone]

## 2023-11-28 DIAGNOSIS — I10 ESSENTIAL (PRIMARY) HYPERTENSION: ICD-10-CM

## 2023-11-29 RX ORDER — LEVOTHYROXINE SODIUM 0.07 MG/1
75 TABLET ORAL
Qty: 30 TABLET | Refills: 0 | Status: SHIPPED | OUTPATIENT
Start: 2023-11-29

## 2023-11-29 RX ORDER — ATORVASTATIN CALCIUM 80 MG/1
80 TABLET, FILM COATED ORAL NIGHTLY
Qty: 30 TABLET | Refills: 0 | Status: SHIPPED | OUTPATIENT
Start: 2023-11-29

## 2023-11-29 NOTE — TELEPHONE ENCOUNTER
Last appointment: 11/17/23  Next appointment: 2/16/24  Previous refill encounter(s): 10/24/23 30 d/s    Requested Prescriptions     Pending Prescriptions Disp Refills    levothyroxine (SYNTHROID) 75 MCG tablet [Pharmacy Med Name: LEVOTHYROXINE 75 MCG TABLET] 30 tablet 0     Sig: TAKE ONE TABLET BY MOUTH EVERY MORNING BEFORE BREAKFAST    atorvastatin (LIPITOR) 80 MG tablet [Pharmacy Med Name: ATORVASTATIN 80 MG TABLET] 30 tablet 0     Sig: TAKE ONE TABLET BY MOUTH ONCE NIGHTLY         For Pharmacy Admin Tracking Only    Program: Medication Refill  CPA in place:    Recommendation Provided To:    Intervention Detail: New Rx: 2, reason: Patient Preference  Intervention Accepted By:   Saravanan Lopez Closed?:    Time Spent (min): 5

## 2023-12-05 DIAGNOSIS — E11.21 TYPE 2 DIABETES MELLITUS WITH NEPHROPATHY (HCC): ICD-10-CM

## 2023-12-05 DIAGNOSIS — I10 ESSENTIAL (PRIMARY) HYPERTENSION: ICD-10-CM

## 2023-12-05 DIAGNOSIS — I10 PRIMARY HYPERTENSION: ICD-10-CM

## 2023-12-05 NOTE — TELEPHONE ENCOUNTER
Pt dtr calling to have script sent to [facility]     blood glucose monitor kit and supplies   Blood pressure Kit   (Looks like in the system but not sent (?)    She doesn't mind coming to office to  script     Best number to reach her is [phone] (Hayley )

## 2023-12-07 NOTE — TELEPHONE ENCOUNTER
Pt dtr calling back (Hayley)     Nayana had BP kit but Per insurance she has to use [facility] [address]     Pls send  blood glucose monitor kit and supplies and   Blood pressure Kit as soon as possible       Best number to reach her is [phone]

## 2023-12-11 RX ORDER — BLOOD PRESSURE TEST KIT
KIT MISCELLANEOUS
Qty: 1 KIT | Refills: 0 | Status: CANCELLED | OUTPATIENT
Start: 2023-12-11

## 2023-12-11 NOTE — TELEPHONE ENCOUNTER
Called michelle with a Telephone order for the signed glucose monitor kit and supplies. Per the pharmacist the blood pressure have to  at [facility].

## 2024-01-05 DIAGNOSIS — E78.00 PURE HYPERCHOLESTEROLEMIA: Primary | ICD-10-CM

## 2024-01-05 DIAGNOSIS — I10 ESSENTIAL (PRIMARY) HYPERTENSION: ICD-10-CM

## 2024-01-05 RX ORDER — ATORVASTATIN CALCIUM 80 MG/1
80 TABLET, FILM COATED ORAL NIGHTLY
Qty: 90 TABLET | Refills: 1 | Status: SHIPPED | OUTPATIENT
Start: 2024-01-05

## 2024-01-05 RX ORDER — LEVOTHYROXINE SODIUM 0.07 MG/1
75 TABLET ORAL
Qty: 90 TABLET | Refills: 1 | Status: SHIPPED | OUTPATIENT
Start: 2024-01-05

## 2024-01-05 NOTE — TELEPHONE ENCOUNTER
Pt daughter, Belinda at office      Pt needs refill - out of meds - was told by pharmacy that it was requested       Atorvastatin  Levothyroxine    Nayana Zarate      Best number to reach her is [phone]

## 2024-02-15 DIAGNOSIS — Z79.4 TYPE 2 DIABETES MELLITUS WITHOUT COMPLICATION, WITH LONG-TERM CURRENT USE OF INSULIN (HCC): ICD-10-CM

## 2024-02-15 DIAGNOSIS — E11.9 TYPE 2 DIABETES MELLITUS WITHOUT COMPLICATION, WITH LONG-TERM CURRENT USE OF INSULIN (HCC): ICD-10-CM

## 2024-02-15 RX ORDER — GLIPIZIDE 5 MG/1
10 TABLET ORAL
Qty: 180 TABLET | Refills: 1 | Status: SHIPPED | OUTPATIENT
Start: 2024-02-15 | End: 2024-02-16 | Stop reason: SDUPTHER

## 2024-02-15 NOTE — TELEPHONE ENCOUNTER
Last appointment: 11/17/23  Next appointment: 2/16/24  Previous refill encounter(s): 10/24/23 #60    Requested Prescriptions     Pending Prescriptions Disp Refills    glipiZIDE (GLUCOTROL) 5 MG tablet [Pharmacy Med Name: glipiZIDE 5 MG TABLET] 180 tablet 1     Sig: TAKE 1 TABLET BY MOUTH TWICE A DAY BEFORE MEALS         For Pharmacy Admin Tracking Only    Program: Medication Refill  CPA in place:    Recommendation Provided To:   Intervention Detail: New Rx: 1, reason: Patient Preference  Intervention Accepted By:   Gap Closed?:    Time Spent (min): 5

## 2024-02-16 ENCOUNTER — OFFICE VISIT (OUTPATIENT)
Age: 84
End: 2024-02-16
Payer: COMMERCIAL

## 2024-02-16 VITALS
HEIGHT: 59 IN | SYSTOLIC BLOOD PRESSURE: 138 MMHG | OXYGEN SATURATION: 100 % | WEIGHT: 156 LBS | HEART RATE: 84 BPM | BODY MASS INDEX: 31.45 KG/M2 | RESPIRATION RATE: 20 BRPM | DIASTOLIC BLOOD PRESSURE: 67 MMHG | TEMPERATURE: 97.7 F

## 2024-02-16 DIAGNOSIS — E11.21 TYPE 2 DIABETES MELLITUS WITH NEPHROPATHY (HCC): ICD-10-CM

## 2024-02-16 DIAGNOSIS — M25.512 CHRONIC LEFT SHOULDER PAIN: ICD-10-CM

## 2024-02-16 DIAGNOSIS — E78.00 PURE HYPERCHOLESTEROLEMIA: ICD-10-CM

## 2024-02-16 DIAGNOSIS — I10 PRIMARY HYPERTENSION: ICD-10-CM

## 2024-02-16 DIAGNOSIS — E11.9 TYPE 2 DIABETES MELLITUS WITHOUT COMPLICATION, WITH LONG-TERM CURRENT USE OF INSULIN (HCC): Primary | ICD-10-CM

## 2024-02-16 DIAGNOSIS — G89.29 CHRONIC LEFT SHOULDER PAIN: ICD-10-CM

## 2024-02-16 DIAGNOSIS — R60.0 BILATERAL LEG EDEMA: ICD-10-CM

## 2024-02-16 DIAGNOSIS — M81.0 POST-MENOPAUSAL OSTEOPOROSIS: ICD-10-CM

## 2024-02-16 DIAGNOSIS — I10 ESSENTIAL (PRIMARY) HYPERTENSION: ICD-10-CM

## 2024-02-16 DIAGNOSIS — Z79.4 TYPE 2 DIABETES MELLITUS WITHOUT COMPLICATION, WITH LONG-TERM CURRENT USE OF INSULIN (HCC): Primary | ICD-10-CM

## 2024-02-16 LAB
GLUCOSE, POC: 154 MG/DL
HBA1C MFR BLD: 6.7 %

## 2024-02-16 PROCEDURE — 99214 OFFICE O/P EST MOD 30 MIN: CPT | Performed by: INTERNAL MEDICINE

## 2024-02-16 PROCEDURE — PBSHW AMB POC GLUCOSE BLOOD, BY GLUCOSE MONITORING DEVICE: Performed by: INTERNAL MEDICINE

## 2024-02-16 PROCEDURE — 1123F ACP DISCUSS/DSCN MKR DOCD: CPT | Performed by: INTERNAL MEDICINE

## 2024-02-16 PROCEDURE — 83036 HEMOGLOBIN GLYCOSYLATED A1C: CPT | Performed by: INTERNAL MEDICINE

## 2024-02-16 PROCEDURE — 3075F SYST BP GE 130 - 139MM HG: CPT | Performed by: INTERNAL MEDICINE

## 2024-02-16 PROCEDURE — PBSHW AMB POC HEMOGLOBIN A1C: Performed by: INTERNAL MEDICINE

## 2024-02-16 PROCEDURE — 82962 GLUCOSE BLOOD TEST: CPT | Performed by: INTERNAL MEDICINE

## 2024-02-16 PROCEDURE — 3078F DIAST BP <80 MM HG: CPT | Performed by: INTERNAL MEDICINE

## 2024-02-16 RX ORDER — GLIPIZIDE 5 MG/1
10 TABLET ORAL
Qty: 180 TABLET | Refills: 1 | Status: SHIPPED | OUTPATIENT
Start: 2024-02-16

## 2024-02-16 RX ORDER — LEVOTHYROXINE SODIUM 0.07 MG/1
75 TABLET ORAL
Qty: 90 TABLET | Refills: 1 | Status: SHIPPED | OUTPATIENT
Start: 2024-02-16

## 2024-02-16 RX ORDER — VALSARTAN 320 MG/1
320 TABLET ORAL DAILY
Qty: 90 TABLET | Refills: 1 | Status: SHIPPED | OUTPATIENT
Start: 2024-02-16

## 2024-02-16 RX ORDER — SENNOSIDES 8.6 MG
CAPSULE ORAL
Qty: 60 TABLET | Refills: 1 | Status: SHIPPED | OUTPATIENT
Start: 2024-02-16

## 2024-02-16 RX ORDER — ATORVASTATIN CALCIUM 80 MG/1
80 TABLET, FILM COATED ORAL NIGHTLY
Qty: 90 TABLET | Refills: 1 | Status: SHIPPED | OUTPATIENT
Start: 2024-02-16

## 2024-02-16 RX ORDER — BLOOD PRESSURE TEST KIT
KIT MISCELLANEOUS
Qty: 1 KIT | Refills: 0 | Status: SHIPPED | OUTPATIENT
Start: 2024-02-16 | End: 2024-02-19 | Stop reason: SDUPTHER

## 2024-02-16 RX ORDER — AMLODIPINE BESYLATE 10 MG/1
10 TABLET ORAL DAILY
Qty: 90 TABLET | Refills: 1 | Status: SHIPPED | OUTPATIENT
Start: 2024-02-16

## 2024-02-16 RX ORDER — HYDROCHLOROTHIAZIDE 12.5 MG/1
12.5 TABLET ORAL DAILY
Qty: 90 TABLET | Refills: 1 | Status: CANCELLED | OUTPATIENT
Start: 2024-02-16

## 2024-02-16 RX ORDER — FUROSEMIDE 20 MG/1
20 TABLET ORAL 2 TIMES DAILY
Qty: 60 TABLET | Refills: 0 | Status: SHIPPED | OUTPATIENT
Start: 2024-02-16

## 2024-02-16 RX ORDER — MULTIVIT WITH MINERALS/LUTEIN
1000 TABLET ORAL DAILY
Qty: 30 TABLET | Refills: 2 | Status: SHIPPED | OUTPATIENT
Start: 2024-02-16

## 2024-02-16 ASSESSMENT — PATIENT HEALTH QUESTIONNAIRE - PHQ9
SUM OF ALL RESPONSES TO PHQ QUESTIONS 1-9: 0
SUM OF ALL RESPONSES TO PHQ9 QUESTIONS 1 & 2: 0
2. FEELING DOWN, DEPRESSED OR HOPELESS: 0
SUM OF ALL RESPONSES TO PHQ QUESTIONS 1-9: 0
1. LITTLE INTEREST OR PLEASURE IN DOING THINGS: 0

## 2024-02-16 NOTE — PROGRESS NOTES
asthmatic and \"knows not to take ASA\"  Unsure of reaction      Codeine Itching     Also nausea, diarrhea, dizziness    Iodine Hives    Shellfish Allergy Hives and Other (See Comments)     Asthma attack       Objective:  /67   Pulse 84   Temp 97.7 °F (36.5 °C) (Temporal)   Resp 20   Ht 1.499 m (4' 11\")   Wt 70.8 kg (156 lb)   SpO2 100%   BMI 31.51 kg/m²   Physical Exam:   General appearance - alert, well appearing in no distress  Mental status - alert, oriented to person, place, and time  Neck - supple, no significant adenopathy   Chest - no wheezes, rales or rhonchi, symmetric air entry   Heart - normal S1, S2, no murmurs, rubs  Abdomen - soft, nontender, nondistended, no organomegaly  Ext-peripheral pulses normal, no pedal edema  Skin-Warm and dry. no hyperpigmentation or suspicious lesions  Neuro - no focal findings   Back-full range of motion, no tenderness, palpable spasm or pain on motion     Results for orders placed or performed in visit on 02/16/24   AMB POC HEMOGLOBIN A1C   Result Value Ref Range    Hemoglobin A1C, POC 6.7 %   AMB POC GLUCOSE BLOOD, BY GLUCOSE MONITORING DEVICE   Result Value Ref Range    Glucose,  MG/DL     Assessment/Plan:  Lety was seen today for 3 month follow-up and pain.    Diagnoses and all orders for this visit:    Type 2 diabetes mellitus without complication, with long-term current use of insulin (HCC)  -     glipiZIDE (GLUCOTROL) 5 MG tablet; Take 2 tablets by mouth 2 times daily (before meals)  -     Discontinue: Blood Pressure KIT; Use as directed  -     AMB POC HEMOGLOBIN A1C  -     AMB POC GLUCOSE BLOOD, BY GLUCOSE MONITORING DEVICE  -     XR SHOULDER LEFT (MIN 2 VIEWS); Future    Pure hypercholesterolemia  -     atorvastatin (LIPITOR) 80 MG tablet; Take 1 tablet by mouth nightly    Primary hypertension  -     valsartan (DIOVAN) 320 MG tablet; Take 1 tablet by mouth daily  -     amLODIPine (NORVASC) 10 MG tablet; Take 1 tablet by mouth daily  -

## 2024-02-19 ENCOUNTER — HOSPITAL ENCOUNTER (OUTPATIENT)
Facility: HOSPITAL | Age: 84
Discharge: HOME | End: 2024-02-22
Payer: COMMERCIAL

## 2024-02-19 DIAGNOSIS — I10 PRIMARY HYPERTENSION: ICD-10-CM

## 2024-02-19 DIAGNOSIS — Z79.4 TYPE 2 DIABETES MELLITUS WITHOUT COMPLICATION, WITH LONG-TERM CURRENT USE OF INSULIN (HCC): ICD-10-CM

## 2024-02-19 DIAGNOSIS — E11.21 TYPE 2 DIABETES MELLITUS WITH NEPHROPATHY (HCC): ICD-10-CM

## 2024-02-19 DIAGNOSIS — E11.9 TYPE 2 DIABETES MELLITUS WITHOUT COMPLICATION, WITH LONG-TERM CURRENT USE OF INSULIN (HCC): ICD-10-CM

## 2024-02-19 DIAGNOSIS — I10 ESSENTIAL (PRIMARY) HYPERTENSION: ICD-10-CM

## 2024-02-19 PROCEDURE — 73030 X-RAY EXAM OF SHOULDER: CPT

## 2024-02-19 RX ORDER — MEDICAL SUPPLY, MISCELLANEOUS
EACH MISCELLANEOUS
Qty: 1 EACH | Refills: 0 | Status: SHIPPED | OUTPATIENT
Start: 2024-02-19

## 2024-02-19 RX ORDER — BLOOD PRESSURE TEST KIT
KIT MISCELLANEOUS
Qty: 1 KIT | Refills: 0 | Status: SHIPPED | OUTPATIENT
Start: 2024-02-19

## 2024-02-21 DIAGNOSIS — M19.012 ARTHRITIS OF LEFT SHOULDER REGION: Primary | ICD-10-CM

## 2024-03-11 ENCOUNTER — HOSPITAL ENCOUNTER (OUTPATIENT)
Age: 84
Discharge: HOME | End: 2024-03-14
Payer: COMMERCIAL

## 2024-03-11 DIAGNOSIS — M81.0 POST-MENOPAUSAL OSTEOPOROSIS: ICD-10-CM

## 2024-03-11 PROCEDURE — 77080 DXA BONE DENSITY AXIAL: CPT

## 2024-04-19 NOTE — PERIOP NOTES
Left leg elevated to patient comfort level, she is resting, no complaint at this time. 

## 2024-05-24 ENCOUNTER — OFFICE VISIT (OUTPATIENT)
Age: 84
End: 2024-05-24
Payer: COMMERCIAL

## 2024-05-24 VITALS
RESPIRATION RATE: 18 BRPM | HEART RATE: 86 BPM | DIASTOLIC BLOOD PRESSURE: 68 MMHG | BODY MASS INDEX: 30.44 KG/M2 | WEIGHT: 151 LBS | SYSTOLIC BLOOD PRESSURE: 158 MMHG | HEIGHT: 59 IN | OXYGEN SATURATION: 100 % | TEMPERATURE: 97.7 F

## 2024-05-24 DIAGNOSIS — E03.9 ACQUIRED HYPOTHYROIDISM: ICD-10-CM

## 2024-05-24 DIAGNOSIS — E03.9 ACQUIRED HYPOTHYROIDISM: Primary | ICD-10-CM

## 2024-05-24 DIAGNOSIS — E11.9 TYPE 2 DIABETES MELLITUS WITHOUT COMPLICATION, WITH LONG-TERM CURRENT USE OF INSULIN (HCC): ICD-10-CM

## 2024-05-24 DIAGNOSIS — M25.512 CHRONIC LEFT SHOULDER PAIN: ICD-10-CM

## 2024-05-24 DIAGNOSIS — G89.29 CHRONIC LEFT SHOULDER PAIN: ICD-10-CM

## 2024-05-24 DIAGNOSIS — Z79.4 TYPE 2 DIABETES MELLITUS WITHOUT COMPLICATION, WITH LONG-TERM CURRENT USE OF INSULIN (HCC): ICD-10-CM

## 2024-05-24 DIAGNOSIS — I10 PRIMARY HYPERTENSION: ICD-10-CM

## 2024-05-24 DIAGNOSIS — E78.00 PURE HYPERCHOLESTEROLEMIA: ICD-10-CM

## 2024-05-24 DIAGNOSIS — I10 ESSENTIAL (PRIMARY) HYPERTENSION: ICD-10-CM

## 2024-05-24 LAB
ALBUMIN SERPL-MCNC: 3.9 G/DL (ref 3.5–5)
ALBUMIN/GLOB SERPL: 1.1 (ref 1.1–2.2)
ALP SERPL-CCNC: 105 U/L (ref 45–117)
ALT SERPL-CCNC: 26 U/L (ref 12–78)
ANION GAP SERPL CALC-SCNC: 5 MMOL/L (ref 5–15)
AST SERPL-CCNC: 21 U/L (ref 15–37)
BASOPHILS # BLD: 0 K/UL (ref 0–0.1)
BASOPHILS NFR BLD: 0 % (ref 0–1)
BILIRUB SERPL-MCNC: 0.9 MG/DL (ref 0.2–1)
BUN SERPL-MCNC: 18 MG/DL (ref 6–20)
BUN/CREAT SERPL: 16 (ref 12–20)
CALCIUM SERPL-MCNC: 10 MG/DL (ref 8.5–10.1)
CHLORIDE SERPL-SCNC: 107 MMOL/L (ref 97–108)
CHOLEST SERPL-MCNC: 166 MG/DL
CO2 SERPL-SCNC: 29 MMOL/L (ref 21–32)
CREAT SERPL-MCNC: 1.11 MG/DL (ref 0.55–1.02)
DIFFERENTIAL METHOD BLD: NORMAL
EOSINOPHIL # BLD: 0.1 K/UL (ref 0–0.4)
EOSINOPHIL NFR BLD: 1 % (ref 0–7)
ERYTHROCYTE [DISTWIDTH] IN BLOOD BY AUTOMATED COUNT: 13.8 % (ref 11.5–14.5)
EST. AVERAGE GLUCOSE BLD GHB EST-MCNC: 131 MG/DL
GLOBULIN SER CALC-MCNC: 3.5 G/DL (ref 2–4)
GLUCOSE SERPL-MCNC: 86 MG/DL (ref 65–100)
HBA1C MFR BLD: 6.2 % (ref 4–5.6)
HCT VFR BLD AUTO: 41.7 % (ref 35–47)
HDLC SERPL-MCNC: 62 MG/DL
HDLC SERPL: 2.7 (ref 0–5)
HGB BLD-MCNC: 14.2 G/DL (ref 11.5–16)
IMM GRANULOCYTES # BLD AUTO: 0 K/UL (ref 0–0.04)
IMM GRANULOCYTES NFR BLD AUTO: 0 % (ref 0–0.5)
LDLC SERPL CALC-MCNC: 89.8 MG/DL (ref 0–100)
LYMPHOCYTES # BLD: 2.5 K/UL (ref 0.8–3.5)
LYMPHOCYTES NFR BLD: 35 % (ref 12–49)
MCH RBC QN AUTO: 29 PG (ref 26–34)
MCHC RBC AUTO-ENTMCNC: 34.1 G/DL (ref 30–36.5)
MCV RBC AUTO: 85.1 FL (ref 80–99)
MONOCYTES # BLD: 0.7 K/UL (ref 0–1)
MONOCYTES NFR BLD: 9 % (ref 5–13)
NEUTS SEG # BLD: 3.8 K/UL (ref 1.8–8)
NEUTS SEG NFR BLD: 55 % (ref 32–75)
NRBC # BLD: 0 K/UL (ref 0–0.01)
NRBC BLD-RTO: 0 PER 100 WBC
PLATELET # BLD AUTO: 301 K/UL (ref 150–400)
PMV BLD AUTO: 9.5 FL (ref 8.9–12.9)
POTASSIUM SERPL-SCNC: 3.5 MMOL/L (ref 3.5–5.1)
PROT SERPL-MCNC: 7.4 G/DL (ref 6.4–8.2)
RBC # BLD AUTO: 4.9 M/UL (ref 3.8–5.2)
SODIUM SERPL-SCNC: 141 MMOL/L (ref 136–145)
TRIGL SERPL-MCNC: 71 MG/DL
TSH SERPL DL<=0.05 MIU/L-ACNC: 0.42 UIU/ML (ref 0.36–3.74)
VLDLC SERPL CALC-MCNC: 14.2 MG/DL
WBC # BLD AUTO: 7.1 K/UL (ref 3.6–11)

## 2024-05-24 PROCEDURE — 99214 OFFICE O/P EST MOD 30 MIN: CPT | Performed by: INTERNAL MEDICINE

## 2024-05-24 PROCEDURE — 3077F SYST BP >= 140 MM HG: CPT | Performed by: INTERNAL MEDICINE

## 2024-05-24 PROCEDURE — 1123F ACP DISCUSS/DSCN MKR DOCD: CPT | Performed by: INTERNAL MEDICINE

## 2024-05-24 PROCEDURE — 3078F DIAST BP <80 MM HG: CPT | Performed by: INTERNAL MEDICINE

## 2024-05-24 PROCEDURE — 3044F HG A1C LEVEL LT 7.0%: CPT | Performed by: INTERNAL MEDICINE

## 2024-05-24 RX ORDER — VALSARTAN 320 MG/1
320 TABLET ORAL DAILY
Qty: 90 TABLET | Refills: 1 | Status: SHIPPED | OUTPATIENT
Start: 2024-05-24

## 2024-05-24 RX ORDER — HYDROCHLOROTHIAZIDE 12.5 MG/1
12.5 TABLET ORAL DAILY
Qty: 90 TABLET | Refills: 1 | Status: SHIPPED | OUTPATIENT
Start: 2024-05-24

## 2024-05-24 RX ORDER — LIDOCAINE 4 G/G
1 PATCH TOPICAL DAILY
Qty: 30 PATCH | Refills: 0 | Status: SHIPPED | OUTPATIENT
Start: 2024-05-24 | End: 2024-06-23

## 2024-05-24 RX ORDER — SENNOSIDES 8.6 MG
CAPSULE ORAL
Qty: 60 TABLET | Refills: 1 | Status: SHIPPED | OUTPATIENT
Start: 2024-05-24

## 2024-05-24 RX ORDER — LEVOTHYROXINE SODIUM 0.07 MG/1
75 TABLET ORAL
Qty: 90 TABLET | Refills: 1 | Status: SHIPPED | OUTPATIENT
Start: 2024-05-24

## 2024-05-24 RX ORDER — GLIPIZIDE 5 MG/1
10 TABLET ORAL
Qty: 180 TABLET | Refills: 1 | Status: SHIPPED | OUTPATIENT
Start: 2024-05-24

## 2024-05-24 RX ORDER — AMLODIPINE BESYLATE 10 MG/1
10 TABLET ORAL DAILY
Qty: 90 TABLET | Refills: 1 | Status: SHIPPED | OUTPATIENT
Start: 2024-05-24

## 2024-05-24 RX ORDER — ATORVASTATIN CALCIUM 80 MG/1
80 TABLET, FILM COATED ORAL NIGHTLY
Qty: 90 TABLET | Refills: 1 | Status: SHIPPED | OUTPATIENT
Start: 2024-05-24

## 2024-05-24 NOTE — PROGRESS NOTES
Chief Complaint   Patient presents with    Follow-up Chronic Condition     3 mo check    1. Have you been to the ER, urgent care clinic since your last visit?  Hospitalized since your last visit?No    2. Have you seen or consulted any other health care providers outside of the [facility] since your last visit?  Include any pap smears or colon screening. Yes      
(Temporal)   Resp 18   Ht 1.499 m (4' 11\")   Wt 68.5 kg (151 lb)   SpO2 100%   BMI 30.50 kg/m²   Physical Exam:   General appearance - alert, well appearing in no distress  Mental status - alert, oriented to person, place, and time  Neck - supple, no significant adenopathy   Chest - no wheezes, rales or rhonchi, symmetric air entry   Heart - normal S1, S2, no murmurs, rubs   Abdomen - soft, nontender, nondistended, no organomegaly  Ext-peripheral pulses normal, no pedal edema  Skin-Warm and dry. no hyperpigmentation or suspicious lesions  Neuro -no focal findings   Back-full range of motion, no tenderness, palpable spasm or pain on motion     Assessment/Plan:  Lety was seen today for follow-up chronic condition.    Diagnoses and all orders for this visit:    Acquired hypothyroidism  -     TSH; Future    Chronic left shoulder pain  -     lidocaine 4 % external patch; Place 1 patch onto the skin daily  -     acetaminophen (TYLENOL) 650 MG extended release tablet; Take 1 tab by mouth at night M-W-F as needed  -     Comprehensive Metabolic Panel; Future  -     CBC with Auto Differential; Future    Primary hypertension  -     amLODIPine (NORVASC) 10 MG tablet; Take 1 tablet by mouth daily  -     hydroCHLOROthiazide 12.5 MG tablet; Take 1 tablet by mouth daily  -     valsartan (DIOVAN) 320 MG tablet; Take 1 tablet by mouth daily  -     Comprehensive Metabolic Panel; Future  -     CBC with Auto Differential; Future    Essential (primary) hypertension  -     levothyroxine (SYNTHROID) 75 MCG tablet; Take 1 tablet by mouth every morning (before breakfast)  -     amLODIPine (NORVASC) 10 MG tablet; Take 1 tablet by mouth daily  -     hydroCHLOROthiazide 12.5 MG tablet; Take 1 tablet by mouth daily  -     valsartan (DIOVAN) 320 MG tablet; Take 1 tablet by mouth daily  -     Comprehensive Metabolic Panel; Future  -     CBC with Auto Differential; Future    Pure hypercholesterolemia  -     atorvastatin (LIPITOR) 80 MG

## 2024-08-26 ENCOUNTER — OFFICE VISIT (OUTPATIENT)
Age: 84
End: 2024-08-26
Payer: COMMERCIAL

## 2024-08-26 ENCOUNTER — HOSPITAL ENCOUNTER (OUTPATIENT)
Facility: HOSPITAL | Age: 84
Discharge: HOME | End: 2024-08-29
Payer: COMMERCIAL

## 2024-08-26 VITALS
BODY MASS INDEX: 30.88 KG/M2 | WEIGHT: 153.2 LBS | HEART RATE: 90 BPM | DIASTOLIC BLOOD PRESSURE: 57 MMHG | SYSTOLIC BLOOD PRESSURE: 152 MMHG | TEMPERATURE: 96.5 F | OXYGEN SATURATION: 96 % | HEIGHT: 59 IN | RESPIRATION RATE: 18 BRPM

## 2024-08-26 DIAGNOSIS — E11.9 ENCOUNTER FOR DIABETIC FOOT EXAM (HCC): ICD-10-CM

## 2024-08-26 DIAGNOSIS — G89.29 CHRONIC LEFT SHOULDER PAIN: ICD-10-CM

## 2024-08-26 DIAGNOSIS — Z01.00 DIABETIC EYE EXAM (HCC): ICD-10-CM

## 2024-08-26 DIAGNOSIS — I10 ESSENTIAL (PRIMARY) HYPERTENSION: ICD-10-CM

## 2024-08-26 DIAGNOSIS — E11.21 TYPE 2 DIABETES MELLITUS WITH NEPHROPATHY (HCC): Primary | ICD-10-CM

## 2024-08-26 DIAGNOSIS — E03.9 ACQUIRED HYPOTHYROIDISM: ICD-10-CM

## 2024-08-26 DIAGNOSIS — M25.512 CHRONIC LEFT SHOULDER PAIN: ICD-10-CM

## 2024-08-26 DIAGNOSIS — M17.11 ARTHRITIS OF KNEE, RIGHT: ICD-10-CM

## 2024-08-26 DIAGNOSIS — E11.9 DIABETIC EYE EXAM (HCC): ICD-10-CM

## 2024-08-26 DIAGNOSIS — E78.00 PURE HYPERCHOLESTEROLEMIA: ICD-10-CM

## 2024-08-26 PROCEDURE — 3078F DIAST BP <80 MM HG: CPT | Performed by: INTERNAL MEDICINE

## 2024-08-26 PROCEDURE — 3044F HG A1C LEVEL LT 7.0%: CPT | Performed by: INTERNAL MEDICINE

## 2024-08-26 PROCEDURE — 99214 OFFICE O/P EST MOD 30 MIN: CPT | Performed by: INTERNAL MEDICINE

## 2024-08-26 PROCEDURE — 73030 X-RAY EXAM OF SHOULDER: CPT

## 2024-08-26 PROCEDURE — 3077F SYST BP >= 140 MM HG: CPT | Performed by: INTERNAL MEDICINE

## 2024-08-26 PROCEDURE — 1123F ACP DISCUSS/DSCN MKR DOCD: CPT | Performed by: INTERNAL MEDICINE

## 2024-08-26 RX ORDER — SENNOSIDES 8.6 MG
CAPSULE ORAL
Qty: 60 TABLET | Refills: 1 | Status: SHIPPED | OUTPATIENT
Start: 2024-08-26

## 2024-08-26 RX ORDER — TIZANIDINE 2 MG/1
2 TABLET ORAL 3 TIMES DAILY PRN
Qty: 30 TABLET | Refills: 0 | Status: SHIPPED | OUTPATIENT
Start: 2024-08-26

## 2024-08-26 NOTE — PROGRESS NOTES
Chief Complaint   Patient presents with    Follow-up     3 mo check    1. Have you been to the ER, urgent care clinic since your last visit?  Hospitalized since your last visit?Yes Hospital    2. Have you seen or consulted any other health care providers outside of the [facility] since your last visit?  Include any pap smears or colon screening. Yes

## 2024-08-26 NOTE — PROGRESS NOTES
Chief Complaint   Patient presents with    Follow-up     HPI:  Lety Banerjee is a 83 y.o. AA female with diabetes type 2, hypertension, hypercholesterolemia, hypothyroidism presents for follow-up.  Blood pressure reading is not at goal; reports she has not take he medications.   The mean purpose for this visit is to evaluate worsening left shoulder and left knee pain.  She is requesting referral to a specialist.    Review of Systems  As per hpi    Past Medical History:   Diagnosis Date    Arthritis     Asthma     Chronic kidney disease     RIGHT KIDNEY STONE    Chronic left shoulder pain     Chronic pain     Diabetes (HCC)     History of pancreatitis 2018    Hypertension     Mass of kidney     BEING WATCHED BY UROLOGY    Mixed hyperlipidemia 10/28/2011    Thyroid disease     s/p partial thyroidectomy     Past Surgical History:   Procedure Laterality Date    EGD INTRMURAL NEEDLE ASPIR/BIOP ALTERED ANATOMY  10/31/2011    PT DENIES    EYE SURGERY      L CATARACT REMOVED    GYN       x 2    JOINT REPLACEMENT      L HIP    OTHER SURGICAL HISTORY      thyroidectomy - partial    TOTAL HIP ARTHROPLASTY Right 2023    RIGHT HIP TOTAL ARTHROPLASTY ANTERIOR APPROACH NAVIGATION (VELYS) performed by Michael Harp MD at [facility] MAIN OR     Social History     Socioeconomic History    Marital status:      Spouse name: None    Number of children: None    Years of education: None    Highest education level: None   Tobacco Use    Smoking status: Never    Smokeless tobacco: Never   Vaping Use    Vaping status: Never Used   Substance and Sexual Activity    Alcohol use: Not Currently     Alcohol/week: 1.0 standard drink of alcohol    Drug use: No    Sexual activity: Not Currently   Social History Narrative    ** Merged History Encounter **         ** Merged History Encounter **          Social Determinants of Health     Financial Resource Strain: Low Risk  (2023)    Overall Financial

## 2024-09-12 DIAGNOSIS — G89.29 CHRONIC LEFT SHOULDER PAIN: ICD-10-CM

## 2024-09-12 DIAGNOSIS — M17.11 ARTHRITIS OF KNEE, RIGHT: ICD-10-CM

## 2024-09-12 DIAGNOSIS — M25.512 CHRONIC LEFT SHOULDER PAIN: ICD-10-CM

## 2024-09-13 PROBLEM — M17.11 ARTHRITIS OF RIGHT KNEE: Status: ACTIVE | Noted: 2024-09-13

## 2024-09-15 RX ORDER — TIZANIDINE 2 MG/1
TABLET ORAL
Qty: 30 TABLET | Refills: 2 | Status: SHIPPED | OUTPATIENT
Start: 2024-09-15

## 2024-09-23 ENCOUNTER — TELEPHONE (OUTPATIENT)
Age: 84
End: 2024-09-23

## 2024-09-23 NOTE — TELEPHONE ENCOUNTER
Patient called, stating she Is having knee surgery on 10/16/24    Dr Harp is doing the surgery,    Patient states the physician told her to see her pcp and have labs drawn    Patient does not have the information regarding what type of labs need to be done    I explained normally that patient's will need to have a pre op physical and a form will need to be filled out     When can we see patient    Please call  [phone]

## 2024-09-23 NOTE — TELEPHONE ENCOUNTER
Scheduled patient for pre-op visit 10/07/2024. Called Dr. Harp's office to inquire what labs Dr. Connelly needed to do. Per Dr. Harp's office no form or labs needed. Office note in the system will be fine.

## 2024-10-03 ENCOUNTER — HOSPITAL ENCOUNTER (OUTPATIENT)
Facility: HOSPITAL | Age: 84
Discharge: HOME | End: 2024-10-06
Payer: COMMERCIAL

## 2024-10-03 VITALS
OXYGEN SATURATION: 98 % | DIASTOLIC BLOOD PRESSURE: 72 MMHG | HEART RATE: 81 BPM | BODY MASS INDEX: 31.04 KG/M2 | HEIGHT: 59 IN | WEIGHT: 154 LBS | SYSTOLIC BLOOD PRESSURE: 146 MMHG | TEMPERATURE: 97.7 F

## 2024-10-03 LAB
ABO + RH BLD: NORMAL
ANION GAP SERPL CALC-SCNC: 7 MMOL/L (ref 2–12)
APPEARANCE UR: CLEAR
BACTERIA URNS QL MICRO: NEGATIVE /HPF
BILIRUB UR QL: NEGATIVE
BLOOD GROUP ANTIBODIES SERPL: NORMAL
BUN SERPL-MCNC: 19 MG/DL (ref 6–20)
BUN/CREAT SERPL: 15 (ref 12–20)
CALCIUM SERPL-MCNC: 9.7 MG/DL (ref 8.5–10.1)
CHLORIDE SERPL-SCNC: 100 MMOL/L (ref 97–108)
CO2 SERPL-SCNC: 31 MMOL/L (ref 21–32)
COLOR UR: ABNORMAL
CREAT SERPL-MCNC: 1.3 MG/DL (ref 0.55–1.02)
EKG ATRIAL RATE: 70 BPM
EKG DIAGNOSIS: NORMAL
EKG P AXIS: 54 DEGREES
EKG P-R INTERVAL: 162 MS
EKG Q-T INTERVAL: 394 MS
EKG QRS DURATION: 84 MS
EKG QTC CALCULATION (BAZETT): 425 MS
EKG R AXIS: -20 DEGREES
EKG T AXIS: 46 DEGREES
EKG VENTRICULAR RATE: 70 BPM
EPITH CASTS URNS QL MICRO: ABNORMAL /LPF
ERYTHROCYTE [DISTWIDTH] IN BLOOD BY AUTOMATED COUNT: 13.2 % (ref 11.5–14.5)
EST. AVERAGE GLUCOSE BLD GHB EST-MCNC: 137 MG/DL
GLUCOSE SERPL-MCNC: 237 MG/DL (ref 65–100)
GLUCOSE UR STRIP.AUTO-MCNC: 100 MG/DL
HBA1C MFR BLD: 6.4 % (ref 4–5.6)
HCT VFR BLD AUTO: 41.3 % (ref 35–47)
HGB BLD-MCNC: 13.9 G/DL (ref 11.5–16)
HGB UR QL STRIP: NEGATIVE
HYALINE CASTS URNS QL MICRO: ABNORMAL /LPF (ref 0–5)
INR PPP: 1 (ref 0.9–1.1)
KETONES UR QL STRIP.AUTO: NEGATIVE MG/DL
LEUKOCYTE ESTERASE UR QL STRIP.AUTO: ABNORMAL
MCH RBC QN AUTO: 28.9 PG (ref 26–34)
MCHC RBC AUTO-ENTMCNC: 33.7 G/DL (ref 30–36.5)
MCV RBC AUTO: 85.9 FL (ref 80–99)
NITRITE UR QL STRIP.AUTO: NEGATIVE
NRBC # BLD: 0 K/UL (ref 0–0.01)
NRBC BLD-RTO: 0 PER 100 WBC
PH UR STRIP: 6.5 (ref 5–8)
PLATELET # BLD AUTO: 292 K/UL (ref 150–400)
PMV BLD AUTO: 9.9 FL (ref 8.9–12.9)
POTASSIUM SERPL-SCNC: 3.3 MMOL/L (ref 3.5–5.1)
PROT UR STRIP-MCNC: NEGATIVE MG/DL
PROTHROMBIN TIME: 10.2 SEC (ref 9–11.1)
RBC # BLD AUTO: 4.81 M/UL (ref 3.8–5.2)
RBC #/AREA URNS HPF: ABNORMAL /HPF (ref 0–5)
SODIUM SERPL-SCNC: 138 MMOL/L (ref 136–145)
SP GR UR REFRACTOMETRY: 1.01 (ref 1–1.03)
SPECIMEN EXP DATE BLD: NORMAL
URINE CULTURE IF INDICATED: ABNORMAL
UROBILINOGEN UR QL STRIP.AUTO: 0.2 EU/DL (ref 0.2–1)
WBC # BLD AUTO: 6.5 K/UL (ref 3.6–11)
WBC URNS QL MICRO: ABNORMAL /HPF (ref 0–4)

## 2024-10-03 PROCEDURE — 80048 BASIC METABOLIC PNL TOTAL CA: CPT

## 2024-10-03 PROCEDURE — 93005 ELECTROCARDIOGRAM TRACING: CPT | Performed by: ORTHOPAEDIC SURGERY

## 2024-10-03 PROCEDURE — 86901 BLOOD TYPING SEROLOGIC RH(D): CPT

## 2024-10-03 PROCEDURE — 85027 COMPLETE CBC AUTOMATED: CPT

## 2024-10-03 PROCEDURE — 83036 HEMOGLOBIN GLYCOSYLATED A1C: CPT

## 2024-10-03 PROCEDURE — 81001 URINALYSIS AUTO W/SCOPE: CPT

## 2024-10-03 PROCEDURE — 86900 BLOOD TYPING SEROLOGIC ABO: CPT

## 2024-10-03 PROCEDURE — 36415 COLL VENOUS BLD VENIPUNCTURE: CPT

## 2024-10-03 PROCEDURE — APPNB30 APP NON BILLABLE TIME 0-30 MINS: Performed by: NURSE PRACTITIONER

## 2024-10-03 PROCEDURE — 86850 RBC ANTIBODY SCREEN: CPT

## 2024-10-03 PROCEDURE — 85610 PROTHROMBIN TIME: CPT

## 2024-10-03 ASSESSMENT — PAIN DESCRIPTION - PAIN TYPE: TYPE: CHRONIC PAIN

## 2024-10-03 ASSESSMENT — KOOS JR
TWISING OR PIVOTING ON KNEE: SEVERE
STRAIGHTENING KNEE FULLY: MILD
BENDING TO THE FLOOR TO PICK UP OBJECT: EXTREME
GOING UP OR DOWN STAIRS: EXTREME
KOOS JR TOTAL INTERVAL SCORE: 36.931
RISING FROM SITTING: SEVERE
HOW SEVERE IS YOUR KNEE STIFFNESS AFTER FIRST WAKING IN MORNING: MODERATE
STANDING UPRIGHT: SEVERE

## 2024-10-03 ASSESSMENT — PROMIS GLOBAL HEALTH SCALE
SUM OF RESPONSES TO QUESTIONS 3, 6, 7, & 8: 15
IN THE PAST 7 DAYS, HOW WOULD YOU RATE YOUR FATIGUE ON AVERAGE [ON A SCALE FROM 1 (NONE) TO 5 (VERY SEVERE)]?: MILD
TO WHAT EXTENT ARE YOU ABLE TO CARRY OUT YOUR EVERYDAY PHYSICAL ACTIVITIES SUCH AS WALKING, CLIMBING STAIRS, CARRYING GROCERIES, OR MOVING A CHAIR [ON A SCALE OF 1 (NOT AT ALL) TO 5 (COMPLETELY)]?: NOT AT ALL
IN THE PAST 7 DAYS, HOW WOULD YOU RATE YOUR PAIN ON AVERAGE [ON A SCALE FROM 0 (NO PAIN) TO 10 (WORST IMAGINABLE PAIN)]?: 8
IN GENERAL, HOW WOULD YOU RATE YOUR MENTAL HEALTH, INCLUDING YOUR MOOD AND YOUR ABILITY TO THINK [ON A SCALE OF 1 (POOR) TO 5 (EXCELLENT)]?: VERY GOOD
IN THE PAST 7 DAYS, HOW OFTEN HAVE YOU BEEN BOTHERED BY EMOTIONAL PROBLEMS, SUCH AS FEELING ANXIOUS, DEPRESSED, OR IRRITABLE [ON A SCALE FROM 1 (NEVER) TO 5 (ALWAYS)]?: NEVER
IN GENERAL, WOULD YOU SAY YOUR QUALITY OF LIFE IS...[ON A SCALE OF 1 (POOR) TO 5 (EXCELLENT)]: VERY GOOD
HOW IS THE PROMIS V1.1 BEING ADMINISTERED?: PAPER
WHO IS THE PERSON COMPLETING THE PROMIS V1.1 SURVEY?: SURROGATE
SUM OF RESPONSES TO QUESTIONS 2, 4, 5, & 10: 17
IN GENERAL, HOW WOULD YOU RATE YOUR SATISFACTION WITH YOUR SOCIAL ACTIVITIES AND RELATIONSHIPS [ON A SCALE OF 1 (POOR) TO 5 (EXCELLENT)]?: VERY GOOD
IN GENERAL, HOW WOULD YOU RATE YOUR PHYSICAL HEALTH [ON A SCALE OF 1 (POOR) TO 5 (EXCELLENT)]?: FAIR
IN GENERAL, PLEASE RATE HOW WELL YOU CARRY OUT YOUR USUAL SOCIAL ACTIVITIES (INCLUDES ACTIVITIES AT HOME, AT WORK, AND IN YOUR COMMUNITY, AND RESPONSIBILITIES AS A PARENT, CHILD, SPOUSE, EMPLOYEE, FRIEND, ETC) [ON A SCALE OF 1 (POOR) TO 5 (EXCELLENT)]?: POOR
IN GENERAL, WOULD YOU SAY YOUR HEALTH IS...[ON A SCALE OF 1 (POOR) TO 5 (EXCELLENT)]: GOOD

## 2024-10-03 ASSESSMENT — PAIN - FUNCTIONAL ASSESSMENT: PAIN_FUNCTIONAL_ASSESSMENT: PREVENTS OR INTERFERES WITH MANY ACTIVE NOT PASSIVE ACTIVITIES

## 2024-10-03 ASSESSMENT — PAIN DESCRIPTION - LOCATION: LOCATION: BACK;SHOULDER;KNEE

## 2024-10-03 ASSESSMENT — PAIN DESCRIPTION - ORIENTATION: ORIENTATION: RIGHT;LEFT

## 2024-10-03 ASSESSMENT — PAIN SCALES - GENERAL: PAINLEVEL_OUTOF10: 8

## 2024-10-03 ASSESSMENT — PAIN DESCRIPTION - DESCRIPTORS: DESCRIPTORS: ACHING

## 2024-10-03 NOTE — PERIOP NOTE
[address] [address]   MAIN OR                     [phone]    MAIN PRE OP             [phone]                                                                                AMBULATORY PRE OP          [phone]  PRE-ADMISSION TESTING    [phone]     Surgery Date:  10/16/24       Is surgery arrival time given by surgeon?  YES  NO    If “NO”, [facility] staff will call you between 4 and 7pm the day before your surgery with your arrival time. (If your surgery is on a Monday, we will call you the Friday before.)    Call [phone] after 7pm Monday-Friday if you did not receive this call.    INSTRUCTIONS BEFORE YOUR SURGERY   When You  Arrive Arrive at [facility] Patient Access on 1st floor the day of your surgery.  Have your insurance card, photo ID,living will/advanced directive/POA (if applicable),  and any copayment (if needed)   Food   and   Drink NO solid food after midnight the night before surgery. You can drink clear liquids from midnight until ONE hour prior to your arrival at the hospital on the day of your surgery. Clear liquids include:  Water  Fruit juices without pulp  Carbonated beverages  Black coffee(no cream/milk)  Tea(no cream/milk)  Gatorade    No alcohol (beer, wine, liquor) or marijuana (smoking) 24 hours, edibles (3 days). Stop smoking cigarettes 14 days before surgery (helps w/healing and breathing).   Medications to   TAKE   Morning of Surgery MEDICATIONS TO TAKE THE MORNING OF SURGERY WITH A SIP OF WATER:   AMLODIPINE, SYNTHROID  You may take these medications, IF NEEDED, the morning of surgery: TYLENOL     Medications to STOP  before surgery Non-Steroidal anti-inflammatory Drugs (NSAID's): for example, Ibuprofen (Advil, Motrin), Naproxen (Aleve) 3 days  STOP all herbal supplements and vitamins ON 10/9/24           Bathing Clothing  Jewelry  Valuables     When you shower the morning of surgery, please do  turn off the shower.  Put CHG soap on washcloth and apply to your entire body from the neck down. Do not use on your head, face or private parts(genitals). Do not use CHG soap on open sores, wounds or areas of skin irritation.  Wash you body gently for 5 minutes. Do not wash your skin too hard. This soap does not create lather. Pay special attention to your underarms and from your belly button to your feet.  Turn the shower back on and rinse well to get CHG soap off your body.  Pat your skin dry with a clean, dry towel. Do not apply lotions or moisturizer.  Put on clean clothes and sleep on fresh bed sheets and do not allow pets to sleep with you.    Shower with CHG soap 2 times before your surgery  The evening before your surgery  The morning of your surgery      Tips to help prevent infections after your surgery:  Protect your surgical wound from germs:  Hand washing is the most important thing you and your caregivers can do to prevent infections.  Keep your bandage clean and dry!  Do not touch your surgical wound.  Use clean, freshly washed towels and washcloths every time you shower; do not share bath linens with others.  Until your surgical wound is healed, wear clothing and sleep on bed linens that are clean.  Do not allow pets to sleep in your bed with you or touch your surgical wound.  Do not smoke - smoking delays wound healing. This may be a good time to stop smoking.  If you have diabetes, it is important for you to manage your blood sugar levels properly before your surgery as well as after your surgery. Poorly managed blood sugar levels slow down wound healing and prevent you from healing completely.        Testing for Staphylococcus aureus on your skin before surgery    Staphylococcus aureus (staph) is a common bacteria that is found on the body. It normally does not cause infection on healthy skin. Before surgery, you will be tested to see if you have staph by swabbing the inside of your nose. When

## 2024-10-04 LAB
BACTERIA SPEC CULT: NORMAL
BACTERIA SPEC CULT: NORMAL
SERVICE CMNT-IMP: NORMAL

## 2024-10-04 NOTE — PERIOP NOTE
PAT Nurse Practitioner   Pre-Operative Chart Review/Assessment:-ORTHOPEDIC                Patient Name:  Lety Banerjee                                                           Age:   84 y.o.    :  1940     Today's Date:  10/6/2024     Date of PAT:   10/3/24      Date of Surgery:    10/16/24      Procedure(s):  Right Total Knee Arthroplasty     Anesthesia:   Spinal     Surgeon:   Dr. Harp                       PLAN:      1)  PCP:  Ez Connelly MD      2)  Cardiac Clearance:  EKG and METs reviewed. No further cardiac evaluation requested. PAT EKG showed normal sinus rhythm.         3)  Diabetic Treatment Consult:  Not indicated. A1c-6.4      4)  Sleep Apnea evaluation:   Not indicated. JAYLAN Score 3.       5) Treatment for MRSA/Staph Aureus:  Neg      6) Discharge Plan:  Pt plans to go to [facility] post-op.      7) Skin: Denies open wounds, cuts, sores, rashes or other areas of concern in PAT assessment.      8) Additional Concerns:  HTN, T2DM, CKD 3(Cr-1.3)      9) Medication Recommendations Prior to Surgery:  hold glipizide, valsartan and HCTZ DOS and take amlodipine DOS      Additional Orders for Day of Surgery:  none      Records Scanned in Epic:   None              Vital Signs:        Vitals:    10/03/24 1128   BP: (!) 146/72   Pulse: 81   Temp: 97.7 °F (36.5 °C)   SpO2: 98%             Body mass index is 31.1 kg/m².       KNEE DISABILITY OSTEOARTHRITIS AND OUTCOME SCORE    Stiffness - The following question concerns the amount of joing stiffness you have experienced during the last week in your knee. Stiffness is a sensation of restriction or slowness in the ease with which you move your knee joint.  How severe is your knee stiffness after first wakening in the morning?: Moderate    Pain - What amount of knee pain have you experienced the last week during the following activities?  Twisting/pivoting on your knee: Severe  Straightening knee fully: Mild  Going up or down stairs:

## 2024-10-06 PROBLEM — Z01.818 ENCOUNTER FOR PREADMISSION TESTING: Status: ACTIVE | Noted: 2024-10-06

## 2024-10-07 ENCOUNTER — OFFICE VISIT (OUTPATIENT)
Age: 84
End: 2024-10-07
Payer: COMMERCIAL

## 2024-10-07 VITALS
HEART RATE: 88 BPM | RESPIRATION RATE: 20 BRPM | BODY MASS INDEX: 31.04 KG/M2 | TEMPERATURE: 97.7 F | DIASTOLIC BLOOD PRESSURE: 62 MMHG | SYSTOLIC BLOOD PRESSURE: 135 MMHG | OXYGEN SATURATION: 99 % | WEIGHT: 154 LBS | HEIGHT: 59 IN

## 2024-10-07 DIAGNOSIS — I10 PRIMARY HYPERTENSION: ICD-10-CM

## 2024-10-07 DIAGNOSIS — Z01.818 PRE-OP EVALUATION: Primary | ICD-10-CM

## 2024-10-07 DIAGNOSIS — I10 ESSENTIAL (PRIMARY) HYPERTENSION: ICD-10-CM

## 2024-10-07 DIAGNOSIS — E03.9 ACQUIRED HYPOTHYROIDISM: ICD-10-CM

## 2024-10-07 DIAGNOSIS — N18.30 CONTROLLED TYPE 2 DIABETES MELLITUS WITH STAGE 3 CHRONIC KIDNEY DISEASE, UNSPECIFIED WHETHER LONG TERM INSULIN USE (HCC): ICD-10-CM

## 2024-10-07 DIAGNOSIS — E11.22 CONTROLLED TYPE 2 DIABETES MELLITUS WITH STAGE 3 CHRONIC KIDNEY DISEASE, UNSPECIFIED WHETHER LONG TERM INSULIN USE (HCC): ICD-10-CM

## 2024-10-07 PROCEDURE — 3044F HG A1C LEVEL LT 7.0%: CPT | Performed by: INTERNAL MEDICINE

## 2024-10-07 PROCEDURE — 3078F DIAST BP <80 MM HG: CPT | Performed by: INTERNAL MEDICINE

## 2024-10-07 PROCEDURE — 3075F SYST BP GE 130 - 139MM HG: CPT | Performed by: INTERNAL MEDICINE

## 2024-10-07 PROCEDURE — 99214 OFFICE O/P EST MOD 30 MIN: CPT | Performed by: INTERNAL MEDICINE

## 2024-10-07 PROCEDURE — 1123F ACP DISCUSS/DSCN MKR DOCD: CPT | Performed by: INTERNAL MEDICINE

## 2024-10-07 RX ORDER — VALSARTAN 320 MG/1
320 TABLET ORAL DAILY
Qty: 90 TABLET | Refills: 1 | Status: SHIPPED | OUTPATIENT
Start: 2024-10-07

## 2024-10-07 NOTE — PROGRESS NOTES
Pulse 88   Temp 97.7 °F (36.5 °C) (Temporal)   Resp 20   Ht 1.499 m (4' 11\")   Wt 69.9 kg (154 lb)   SpO2 99%   BMI 31.10 kg/m²   Physical Exam:   General appearance - alert, well appearing in no distress  Mental status - alert, oriented to person, place, and time  ENT-no neck nodes or sinus tenderness  Neck - supple, no significant adenopathy   Chest - no wheezes, rales or rhonchi, symmetric air entry   Heart - normal S1, S2, no murmurs, rubs  Abdomen - soft, nontender, nondistended, no organomegaly  Ext-peripheral pulses normal, no pedal edema  Skin-Warm and dry. no rash or lesion.  Neuro -no focal findings   Knee-soft tissue tenderness, reduced range of motion on right.    Assessment/Plan:   Lety was seen today for pre-op exam.    Diagnoses and all orders for this visit:    Pre-op evaluation    Primary hypertension  -     valsartan (DIOVAN) 320 MG tablet; Take 1 tablet by mouth daily    Essential (primary) hypertension  -     valsartan (DIOVAN) 320 MG tablet; Take 1 tablet by mouth daily    Acquired hypothyroidism    Controlled type 2 diabetes mellitus with stage 3 chronic kidney disease, unspecified whether long term insulin use (HCC)      Based on history, physical and lab results patient is stable for schedule procedure.  She is cleared for surgery    Return in about 3 months (around 1/7/2025) for routine follow up.

## 2024-10-15 NOTE — DISCHARGE INSTRUCTIONS
Discharge Instructions Knee Replacement  Dr. Harp  Patient Name  Lety Banerjee    Follow up care  Follow up visit with Dr. Harp/Rosa Clayton PA-C on 11/7/24 at 3PM at [address]  Call [phone] to make any changes  If Home Health has been arranged for you, they will call you to arrange dates/times for visits. Call them if you do not hear from them within 24 hours after you go home.    Activity at home  Take a short walk every hour; except at night when sleeping.  Do your Home Exercise Program 3 times every day.   After exercising lie down and elevate your leg on pillows for 15-30 minutes to decrease swelling.  Refer to your patient notebook for more information.  Bathing and caring for your incision  You may take a shower with your waterproof dressing on your knee.  The waterproof dressing is to stay on your knee until follow up  You may then leave your incision open to air unless you see drainage from your knee.     Preventing blood clots  Take Eliquis 2.5mg twice daily for 30 days   Call Dr. Harp for signs of a blood clot in your leg: calf pain, tenderness, redness, swelling of lower leg   Preventing lung congestion  Use your incentive spirometer 4 times a day; do 10 repetitions each time  Remember to keep the small blue ball between the two arrows when taking a slow, deep breath   Pain Management  Get up and walk a short distance to relieve pain and stiffness.  Place ice wrap on your knee except when you are walking. The gel ice packs should be changed about every 4 hours.  Elevate your leg on pillows for 15-30 minutes.   Pain Medications  Take Tylenol 650mg (take two 325mg tablets) every 6 hours for the next 4 weeks.  If needed, take Tramadol (narcotic pain pill) every 6 hours as prescribed.  Take only if needed and stop once your pain is tolerable.  Take all medications with a small amount of food.   As your pain decreases, take the narcotics less often or take ½

## 2024-10-16 ENCOUNTER — HOSPITAL ENCOUNTER (INPATIENT)
Facility: HOSPITAL | Age: 84
LOS: 4 days | Discharge: HOME | DRG: 326 | End: 2024-10-21
Attending: ORTHOPAEDIC SURGERY | Admitting: ORTHOPAEDIC SURGERY
Payer: COMMERCIAL

## 2024-10-16 ENCOUNTER — ANESTHESIA EVENT (OUTPATIENT)
Facility: HOSPITAL | Age: 84
End: 2024-10-16
Payer: COMMERCIAL

## 2024-10-16 ENCOUNTER — ANESTHESIA (OUTPATIENT)
Facility: HOSPITAL | Age: 84
End: 2024-10-16
Payer: COMMERCIAL

## 2024-10-16 DIAGNOSIS — Z96.651 S/P TKR (TOTAL KNEE REPLACEMENT), RIGHT: Primary | ICD-10-CM

## 2024-10-16 PROBLEM — M17.11 OSTEOARTHRITIS OF RIGHT KNEE, UNSPECIFIED OSTEOARTHRITIS TYPE: Status: ACTIVE | Noted: 2024-10-16

## 2024-10-16 LAB
GLUCOSE BLD STRIP.AUTO-MCNC: 147 MG/DL (ref 65–117)
GLUCOSE BLD STRIP.AUTO-MCNC: 155 MG/DL (ref 65–117)
SERVICE CMNT-IMP: ABNORMAL
SERVICE CMNT-IMP: ABNORMAL

## 2024-10-16 PROCEDURE — C9290 INJ, BUPIVACAINE LIPOSOME: HCPCS | Performed by: ORTHOPAEDIC SURGERY

## 2024-10-16 PROCEDURE — 6370000000 HC RX 637 (ALT 250 FOR IP): Performed by: PHYSICIAN ASSISTANT

## 2024-10-16 PROCEDURE — 27447 TOTAL KNEE ARTHROPLASTY: CPT | Performed by: PHYSICIAN ASSISTANT

## 2024-10-16 PROCEDURE — C1776 JOINT DEVICE (IMPLANTABLE): HCPCS | Performed by: ORTHOPAEDIC SURGERY

## 2024-10-16 PROCEDURE — 2720000010 HC SURG SUPPLY STERILE: Performed by: ORTHOPAEDIC SURGERY

## 2024-10-16 PROCEDURE — 3700000001 HC ADD 15 MINUTES (ANESTHESIA): Performed by: ORTHOPAEDIC SURGERY

## 2024-10-16 PROCEDURE — 2500000003 HC RX 250 WO HCPCS: Performed by: ANESTHESIOLOGY

## 2024-10-16 PROCEDURE — G0378 HOSPITAL OBSERVATION PER HR: HCPCS

## 2024-10-16 PROCEDURE — 2580000003 HC RX 258: Performed by: ORTHOPAEDIC SURGERY

## 2024-10-16 PROCEDURE — 0SRC0J9 REPLACEMENT OF RIGHT KNEE JOINT WITH SYNTHETIC SUBSTITUTE, CEMENTED, OPEN APPROACH: ICD-10-PCS | Performed by: ORTHOPAEDIC SURGERY

## 2024-10-16 PROCEDURE — 6360000002 HC RX W HCPCS: Performed by: ANESTHESIOLOGY

## 2024-10-16 PROCEDURE — 2500000003 HC RX 250 WO HCPCS: Performed by: ORTHOPAEDIC SURGERY

## 2024-10-16 PROCEDURE — 7100000001 HC PACU RECOVERY - ADDTL 15 MIN: Performed by: ORTHOPAEDIC SURGERY

## 2024-10-16 PROCEDURE — 2709999900 HC NON-CHARGEABLE SUPPLY: Performed by: ORTHOPAEDIC SURGERY

## 2024-10-16 PROCEDURE — 6360000002 HC RX W HCPCS

## 2024-10-16 PROCEDURE — 3700000000 HC ANESTHESIA ATTENDED CARE: Performed by: ORTHOPAEDIC SURGERY

## 2024-10-16 PROCEDURE — 20985 CPTR-ASST DIR MS PX: CPT | Performed by: ORTHOPAEDIC SURGERY

## 2024-10-16 PROCEDURE — 2580000003 HC RX 258: Performed by: ANESTHESIOLOGY

## 2024-10-16 PROCEDURE — 82962 GLUCOSE BLOOD TEST: CPT

## 2024-10-16 PROCEDURE — 2580000003 HC RX 258: Performed by: PHYSICIAN ASSISTANT

## 2024-10-16 PROCEDURE — 3600000015 HC SURGERY LEVEL 5 ADDTL 15MIN: Performed by: ORTHOPAEDIC SURGERY

## 2024-10-16 PROCEDURE — 6360000002 HC RX W HCPCS: Performed by: PHYSICIAN ASSISTANT

## 2024-10-16 PROCEDURE — 27447 TOTAL KNEE ARTHROPLASTY: CPT | Performed by: ORTHOPAEDIC SURGERY

## 2024-10-16 PROCEDURE — 3600000005 HC SURGERY LEVEL 5 BASE: Performed by: ORTHOPAEDIC SURGERY

## 2024-10-16 PROCEDURE — 6360000002 HC RX W HCPCS: Performed by: ORTHOPAEDIC SURGERY

## 2024-10-16 PROCEDURE — C1713 ANCHOR/SCREW BN/BN,TIS/BN: HCPCS | Performed by: ORTHOPAEDIC SURGERY

## 2024-10-16 PROCEDURE — 7100000000 HC PACU RECOVERY - FIRST 15 MIN: Performed by: ORTHOPAEDIC SURGERY

## 2024-10-16 DEVICE — SMARTSET GHV GENTAMICIN HIGH VISCOSITY BONE CEMENT 40G
Type: IMPLANTABLE DEVICE | Site: KNEE | Status: FUNCTIONAL
Brand: SMARTSET

## 2024-10-16 DEVICE — ATTUNE KNEE SYSTEM TIBIAL BASE FIXED BEARING SIZE 2 CEMENTED
Type: IMPLANTABLE DEVICE | Site: KNEE | Status: FUNCTIONAL
Brand: ATTUNE

## 2024-10-16 DEVICE — KNEE K1 TOT HEMI STD CEM IMPL CAPPED SYNTHES: Type: IMPLANTABLE DEVICE | Site: KNEE | Status: FUNCTIONAL

## 2024-10-16 DEVICE — ATTUNE KNEE SYSTEM FEMORAL CRUCIATE RETAINING SIZE 2 RIGHT CEMENTED
Type: IMPLANTABLE DEVICE | Site: KNEE | Status: FUNCTIONAL
Brand: ATTUNE

## 2024-10-16 DEVICE — ATTUNE KNEE SYSTEM TIBIAL INSERT FIXED BEARING MEDIAL STABILIZED RIGHT AOX 2, 8MM
Type: IMPLANTABLE DEVICE | Site: KNEE | Status: FUNCTIONAL
Brand: ATTUNE

## 2024-10-16 RX ORDER — NALOXONE HYDROCHLORIDE 0.4 MG/ML
INJECTION, SOLUTION INTRAMUSCULAR; INTRAVENOUS; SUBCUTANEOUS PRN
Status: DISCONTINUED | OUTPATIENT
Start: 2024-10-16 | End: 2024-10-16 | Stop reason: HOSPADM

## 2024-10-16 RX ORDER — SODIUM CHLORIDE 9 MG/ML
INJECTION, SOLUTION INTRAVENOUS PRN
Status: DISCONTINUED | OUTPATIENT
Start: 2024-10-16 | End: 2024-10-16 | Stop reason: HOSPADM

## 2024-10-16 RX ORDER — VALSARTAN 160 MG/1
320 TABLET ORAL EVERY MORNING
Status: DISCONTINUED | OUTPATIENT
Start: 2024-10-16 | End: 2024-10-21 | Stop reason: HOSPADM

## 2024-10-16 RX ORDER — DEXAMETHASONE SODIUM PHOSPHATE 10 MG/ML
8 INJECTION, SOLUTION INTRAMUSCULAR; INTRAVENOUS ONCE
Status: DISCONTINUED | OUTPATIENT
Start: 2024-10-16 | End: 2024-10-16 | Stop reason: HOSPADM

## 2024-10-16 RX ORDER — LIDOCAINE HYDROCHLORIDE 10 MG/ML
INJECTION, SOLUTION INFILTRATION; PERINEURAL PRN
Status: DISCONTINUED | OUTPATIENT
Start: 2024-10-16 | End: 2024-10-16 | Stop reason: HOSPADM

## 2024-10-16 RX ORDER — OXYCODONE HYDROCHLORIDE 5 MG/1
5 TABLET ORAL EVERY 4 HOURS PRN
Status: DISCONTINUED | OUTPATIENT
Start: 2024-10-16 | End: 2024-10-21 | Stop reason: HOSPADM

## 2024-10-16 RX ORDER — ACETAMINOPHEN 500 MG
1000 TABLET ORAL ONCE
Status: COMPLETED | OUTPATIENT
Start: 2024-10-16 | End: 2024-10-16

## 2024-10-16 RX ORDER — SODIUM CHLORIDE 0.9 % (FLUSH) 0.9 %
5-40 SYRINGE (ML) INJECTION PRN
Status: DISCONTINUED | OUTPATIENT
Start: 2024-10-16 | End: 2024-10-16 | Stop reason: HOSPADM

## 2024-10-16 RX ORDER — DIPHENHYDRAMINE HYDROCHLORIDE 50 MG/ML
12.5 INJECTION INTRAMUSCULAR; INTRAVENOUS ONCE
Status: COMPLETED | OUTPATIENT
Start: 2024-10-16 | End: 2024-10-16

## 2024-10-16 RX ORDER — 0.9 % SODIUM CHLORIDE 0.9 %
500 INTRAVENOUS SOLUTION INTRAVENOUS PRN
Status: DISCONTINUED | OUTPATIENT
Start: 2024-10-16 | End: 2024-10-21 | Stop reason: HOSPADM

## 2024-10-16 RX ORDER — GLIPIZIDE 5 MG/1
10 TABLET ORAL
Status: DISCONTINUED | OUTPATIENT
Start: 2024-10-17 | End: 2024-10-17

## 2024-10-16 RX ORDER — AMLODIPINE BESYLATE 5 MG/1
10 TABLET ORAL DAILY
Status: DISCONTINUED | OUTPATIENT
Start: 2024-10-17 | End: 2024-10-21 | Stop reason: HOSPADM

## 2024-10-16 RX ORDER — ONDANSETRON 2 MG/ML
4 INJECTION INTRAMUSCULAR; INTRAVENOUS AS NEEDED
Status: DISCONTINUED | OUTPATIENT
Start: 2024-10-16 | End: 2024-10-16 | Stop reason: HOSPADM

## 2024-10-16 RX ORDER — SODIUM CHLORIDE, SODIUM LACTATE, POTASSIUM CHLORIDE, CALCIUM CHLORIDE 600; 310; 30; 20 MG/100ML; MG/100ML; MG/100ML; MG/100ML
INJECTION, SOLUTION INTRAVENOUS
Status: DISCONTINUED | OUTPATIENT
Start: 2024-10-16 | End: 2024-10-16 | Stop reason: SDUPTHER

## 2024-10-16 RX ORDER — SODIUM CHLORIDE, SODIUM LACTATE, POTASSIUM CHLORIDE, CALCIUM CHLORIDE 600; 310; 30; 20 MG/100ML; MG/100ML; MG/100ML; MG/100ML
INJECTION, SOLUTION INTRAVENOUS CONTINUOUS
Status: DISCONTINUED | OUTPATIENT
Start: 2024-10-16 | End: 2024-10-16 | Stop reason: HOSPADM

## 2024-10-16 RX ORDER — LIDOCAINE HYDROCHLORIDE 10 MG/ML
1 INJECTION, SOLUTION EPIDURAL; INFILTRATION; INTRACAUDAL; PERINEURAL
Status: DISCONTINUED | OUTPATIENT
Start: 2024-10-16 | End: 2024-10-16 | Stop reason: HOSPADM

## 2024-10-16 RX ORDER — SODIUM CHLORIDE 0.9 % (FLUSH) 0.9 %
5-40 SYRINGE (ML) INJECTION EVERY 12 HOURS SCHEDULED
Status: DISCONTINUED | OUTPATIENT
Start: 2024-10-16 | End: 2024-10-16 | Stop reason: HOSPADM

## 2024-10-16 RX ORDER — CELECOXIB 100 MG/1
100 CAPSULE ORAL ONCE
Status: COMPLETED | OUTPATIENT
Start: 2024-10-16 | End: 2024-10-16

## 2024-10-16 RX ORDER — SENNA AND DOCUSATE SODIUM 50; 8.6 MG/1; MG/1
1 TABLET, FILM COATED ORAL 2 TIMES DAILY
Status: DISCONTINUED | OUTPATIENT
Start: 2024-10-16 | End: 2024-10-17

## 2024-10-16 RX ORDER — DIPHENHYDRAMINE HYDROCHLORIDE 50 MG/ML
INJECTION INTRAMUSCULAR; INTRAVENOUS
Status: COMPLETED
Start: 2024-10-16 | End: 2024-10-16

## 2024-10-16 RX ORDER — ATORVASTATIN CALCIUM 40 MG/1
80 TABLET, FILM COATED ORAL NIGHTLY
Status: DISCONTINUED | OUTPATIENT
Start: 2024-10-16 | End: 2024-10-21 | Stop reason: HOSPADM

## 2024-10-16 RX ORDER — NALOXONE HYDROCHLORIDE 0.4 MG/ML
0.4 INJECTION, SOLUTION INTRAMUSCULAR; INTRAVENOUS; SUBCUTANEOUS PRN
Status: DISCONTINUED | OUTPATIENT
Start: 2024-10-16 | End: 2024-10-21 | Stop reason: HOSPADM

## 2024-10-16 RX ORDER — FENTANYL CITRATE 50 UG/ML
25 INJECTION, SOLUTION INTRAMUSCULAR; INTRAVENOUS EVERY 5 MIN PRN
Status: DISCONTINUED | OUTPATIENT
Start: 2024-10-16 | End: 2024-10-16 | Stop reason: HOSPADM

## 2024-10-16 RX ORDER — ACETAMINOPHEN 500 MG
1000 TABLET ORAL ONCE
Status: DISCONTINUED | OUTPATIENT
Start: 2024-10-16 | End: 2024-10-16 | Stop reason: SDUPTHER

## 2024-10-16 RX ORDER — ONDANSETRON 2 MG/ML
4 INJECTION INTRAMUSCULAR; INTRAVENOUS EVERY 6 HOURS PRN
Status: DISCONTINUED | OUTPATIENT
Start: 2024-10-16 | End: 2024-10-21 | Stop reason: HOSPADM

## 2024-10-16 RX ORDER — INSULIN LISPRO 100 [IU]/ML
0-8 INJECTION, SOLUTION INTRAVENOUS; SUBCUTANEOUS
Status: DISCONTINUED | OUTPATIENT
Start: 2024-10-16 | End: 2024-10-21 | Stop reason: HOSPADM

## 2024-10-16 RX ORDER — SODIUM CHLORIDE 9 MG/ML
INJECTION, SOLUTION INTRAVENOUS PRN
Status: DISCONTINUED | OUTPATIENT
Start: 2024-10-16 | End: 2024-10-21 | Stop reason: HOSPADM

## 2024-10-16 RX ORDER — SODIUM CHLORIDE 9 MG/ML
INJECTION, SOLUTION INTRAVENOUS CONTINUOUS
Status: DISCONTINUED | OUTPATIENT
Start: 2024-10-16 | End: 2024-10-18

## 2024-10-16 RX ORDER — PROCHLORPERAZINE EDISYLATE 5 MG/ML
5 INJECTION INTRAMUSCULAR; INTRAVENOUS
Status: DISCONTINUED | OUTPATIENT
Start: 2024-10-16 | End: 2024-10-16 | Stop reason: HOSPADM

## 2024-10-16 RX ORDER — POLYETHYLENE GLYCOL 3350 17 G/17G
17 POWDER, FOR SOLUTION ORAL DAILY
Status: DISCONTINUED | OUTPATIENT
Start: 2024-10-16 | End: 2024-10-17

## 2024-10-16 RX ORDER — HYDROCHLOROTHIAZIDE 25 MG/1
12.5 TABLET ORAL DAILY
Status: DISCONTINUED | OUTPATIENT
Start: 2024-10-16 | End: 2024-10-21 | Stop reason: HOSPADM

## 2024-10-16 RX ORDER — SODIUM CHLORIDE 0.9 % (FLUSH) 0.9 %
5-40 SYRINGE (ML) INJECTION PRN
Status: DISCONTINUED | OUTPATIENT
Start: 2024-10-16 | End: 2024-10-21 | Stop reason: HOSPADM

## 2024-10-16 RX ORDER — DEXMEDETOMIDINE HYDROCHLORIDE 100 UG/ML
INJECTION, SOLUTION INTRAVENOUS
Status: DISCONTINUED | OUTPATIENT
Start: 2024-10-16 | End: 2024-10-16 | Stop reason: SDUPTHER

## 2024-10-16 RX ORDER — SODIUM CHLORIDE 0.9 % (FLUSH) 0.9 %
5-40 SYRINGE (ML) INJECTION EVERY 12 HOURS SCHEDULED
Status: DISCONTINUED | OUTPATIENT
Start: 2024-10-16 | End: 2024-10-21 | Stop reason: HOSPADM

## 2024-10-16 RX ORDER — TRAMADOL HYDROCHLORIDE 50 MG/1
50 TABLET ORAL EVERY 6 HOURS PRN
Status: DISCONTINUED | OUTPATIENT
Start: 2024-10-16 | End: 2024-10-21 | Stop reason: HOSPADM

## 2024-10-16 RX ORDER — FENTANYL CITRATE 50 UG/ML
100 INJECTION, SOLUTION INTRAMUSCULAR; INTRAVENOUS
Status: COMPLETED | OUTPATIENT
Start: 2024-10-16 | End: 2024-10-16

## 2024-10-16 RX ORDER — TRANEXAMIC ACID 100 MG/ML
INJECTION, SOLUTION INTRAVENOUS
Status: DISCONTINUED | OUTPATIENT
Start: 2024-10-16 | End: 2024-10-16 | Stop reason: SDUPTHER

## 2024-10-16 RX ORDER — ACETAMINOPHEN 325 MG/1
650 TABLET ORAL EVERY 6 HOURS SCHEDULED
Status: DISCONTINUED | OUTPATIENT
Start: 2024-10-16 | End: 2024-10-21 | Stop reason: HOSPADM

## 2024-10-16 RX ORDER — ONDANSETRON 4 MG/1
4 TABLET, ORALLY DISINTEGRATING ORAL EVERY 8 HOURS PRN
Status: DISCONTINUED | OUTPATIENT
Start: 2024-10-16 | End: 2024-10-21 | Stop reason: HOSPADM

## 2024-10-16 RX ORDER — DEXTROSE MONOHYDRATE 100 MG/ML
INJECTION, SOLUTION INTRAVENOUS CONTINUOUS PRN
Status: DISCONTINUED | OUTPATIENT
Start: 2024-10-16 | End: 2024-10-21 | Stop reason: HOSPADM

## 2024-10-16 RX ORDER — MIDAZOLAM HYDROCHLORIDE 2 MG/2ML
2 INJECTION, SOLUTION INTRAMUSCULAR; INTRAVENOUS PRN
Status: DISCONTINUED | OUTPATIENT
Start: 2024-10-16 | End: 2024-10-16 | Stop reason: HOSPADM

## 2024-10-16 RX ORDER — FAMOTIDINE 20 MG/1
10 TABLET, FILM COATED ORAL EVERY 12 HOURS
Status: DISCONTINUED | OUTPATIENT
Start: 2024-10-16 | End: 2024-10-17

## 2024-10-16 RX ORDER — BISACODYL 10 MG
10 SUPPOSITORY, RECTAL RECTAL DAILY PRN
Status: DISCONTINUED | OUTPATIENT
Start: 2024-10-16 | End: 2024-10-21 | Stop reason: HOSPADM

## 2024-10-16 RX ORDER — ONDANSETRON 2 MG/ML
4 INJECTION INTRAMUSCULAR; INTRAVENOUS
Status: COMPLETED | OUTPATIENT
Start: 2024-10-16 | End: 2024-10-16

## 2024-10-16 RX ORDER — HYDRALAZINE HYDROCHLORIDE 20 MG/ML
10 INJECTION INTRAMUSCULAR; INTRAVENOUS ONCE
Status: DISCONTINUED | OUTPATIENT
Start: 2024-10-16 | End: 2024-10-16 | Stop reason: HOSPADM

## 2024-10-16 RX ORDER — HYDROMORPHONE HYDROCHLORIDE 1 MG/ML
0.5 INJECTION, SOLUTION INTRAMUSCULAR; INTRAVENOUS; SUBCUTANEOUS EVERY 5 MIN PRN
Status: DISCONTINUED | OUTPATIENT
Start: 2024-10-16 | End: 2024-10-16 | Stop reason: HOSPADM

## 2024-10-16 RX ORDER — OXYCODONE HYDROCHLORIDE 5 MG/1
5 TABLET ORAL
Status: DISCONTINUED | OUTPATIENT
Start: 2024-10-16 | End: 2024-10-16 | Stop reason: HOSPADM

## 2024-10-16 RX ORDER — HYDROXYZINE HYDROCHLORIDE 10 MG/1
10 TABLET, FILM COATED ORAL EVERY 8 HOURS PRN
Status: DISCONTINUED | OUTPATIENT
Start: 2024-10-16 | End: 2024-10-21 | Stop reason: HOSPADM

## 2024-10-16 RX ADMIN — FENTANYL CITRATE 25 MCG: 50 INJECTION, SOLUTION INTRAMUSCULAR; INTRAVENOUS at 14:05

## 2024-10-16 RX ADMIN — DEXMEDETOMIDINE 10 MCG: 100 INJECTION, SOLUTION, CONCENTRATE INTRAVENOUS at 12:12

## 2024-10-16 RX ADMIN — SODIUM CHLORIDE, PRESERVATIVE FREE 10 ML: 5 INJECTION INTRAVENOUS at 20:27

## 2024-10-16 RX ADMIN — ATORVASTATIN CALCIUM 80 MG: 40 TABLET, FILM COATED ORAL at 20:27

## 2024-10-16 RX ADMIN — TRANEXAMIC ACID 1000 MG: 100 INJECTION, SOLUTION INTRAVENOUS at 12:43

## 2024-10-16 RX ADMIN — HYDROCHLOROTHIAZIDE 12.5 MG: 25 TABLET ORAL at 18:33

## 2024-10-16 RX ADMIN — WATER 2000 MG: 1 INJECTION INTRAMUSCULAR; INTRAVENOUS; SUBCUTANEOUS at 12:40

## 2024-10-16 RX ADMIN — FENTANYL CITRATE 25 MCG: 50 INJECTION, SOLUTION INTRAMUSCULAR; INTRAVENOUS at 12:06

## 2024-10-16 RX ADMIN — HYDROMORPHONE HYDROCHLORIDE 0.5 MG: 1 INJECTION, SOLUTION INTRAMUSCULAR; INTRAVENOUS; SUBCUTANEOUS at 14:06

## 2024-10-16 RX ADMIN — ACETAMINOPHEN 650 MG: 325 TABLET ORAL at 17:38

## 2024-10-16 RX ADMIN — HYDROMORPHONE HYDROCHLORIDE 0.25 MG: 1 INJECTION, SOLUTION INTRAMUSCULAR; INTRAVENOUS; SUBCUTANEOUS at 14:01

## 2024-10-16 RX ADMIN — PHENYLEPHRINE HYDROCHLORIDE 60 MCG/MIN: 10 INJECTION INTRAVENOUS at 12:38

## 2024-10-16 RX ADMIN — POLYETHYLENE GLYCOL 3350 17 G: 17 POWDER, FOR SOLUTION ORAL at 17:39

## 2024-10-16 RX ADMIN — DIPHENHYDRAMINE HYDROCHLORIDE 12.5 MG: 50 INJECTION INTRAMUSCULAR; INTRAVENOUS at 15:06

## 2024-10-16 RX ADMIN — ACETAMINOPHEN 1000 MG: 500 TABLET ORAL at 11:38

## 2024-10-16 RX ADMIN — VALSARTAN 320 MG: 160 TABLET, FILM COATED ORAL at 18:33

## 2024-10-16 RX ADMIN — SODIUM CHLORIDE: 9 INJECTION, SOLUTION INTRAVENOUS at 17:39

## 2024-10-16 RX ADMIN — MEPIVACAINE HYDROCHLORIDE 50 MG: 15 INJECTION, SOLUTION EPIDURAL; INFILTRATION at 12:31

## 2024-10-16 RX ADMIN — DEXMEDETOMIDINE 5 MCG: 100 INJECTION, SOLUTION, CONCENTRATE INTRAVENOUS at 13:10

## 2024-10-16 RX ADMIN — FENTANYL CITRATE 25 MCG: 50 INJECTION INTRAMUSCULAR; INTRAVENOUS at 14:22

## 2024-10-16 RX ADMIN — OXYCODONE 5 MG: 5 TABLET ORAL at 17:38

## 2024-10-16 RX ADMIN — SENNOSIDES AND DOCUSATE SODIUM 1 TABLET: 50; 8.6 TABLET ORAL at 20:27

## 2024-10-16 RX ADMIN — ONDANSETRON 4 MG: 2 INJECTION INTRAMUSCULAR; INTRAVENOUS at 14:35

## 2024-10-16 RX ADMIN — PROPOFOL 20 MG: 10 INJECTION, EMULSION INTRAVENOUS at 12:22

## 2024-10-16 RX ADMIN — FAMOTIDINE 10 MG: 20 TABLET, FILM COATED ORAL at 20:26

## 2024-10-16 RX ADMIN — PROPOFOL 40 MCG/KG/MIN: 10 INJECTION, EMULSION INTRAVENOUS at 12:31

## 2024-10-16 RX ADMIN — PROPOFOL 20 MG: 10 INJECTION, EMULSION INTRAVENOUS at 12:17

## 2024-10-16 RX ADMIN — HYDROMORPHONE HYDROCHLORIDE 0.25 MG: 1 INJECTION, SOLUTION INTRAMUSCULAR; INTRAVENOUS; SUBCUTANEOUS at 13:41

## 2024-10-16 RX ADMIN — CELECOXIB 100 MG: 100 CAPSULE ORAL at 11:38

## 2024-10-16 RX ADMIN — SODIUM CHLORIDE, SODIUM LACTATE, POTASSIUM CHLORIDE, AND CALCIUM CHLORIDE: 600; 310; 30; 20 INJECTION, SOLUTION INTRAVENOUS at 12:06

## 2024-10-16 RX ADMIN — PHENYLEPHRINE HYDROCHLORIDE 40 MCG/MIN: 10 INJECTION INTRAVENOUS at 12:31

## 2024-10-16 ASSESSMENT — PAIN SCALES - GENERAL
PAINLEVEL_OUTOF10: 0
PAINLEVEL_OUTOF10: 4
PAINLEVEL_OUTOF10: 8
PAINLEVEL_OUTOF10: 6
PAINLEVEL_OUTOF10: 6
PAINLEVEL_OUTOF10: 8
PAINLEVEL_OUTOF10: 7

## 2024-10-16 ASSESSMENT — PAIN DESCRIPTION - LOCATION
LOCATION: KNEE

## 2024-10-16 ASSESSMENT — PAIN DESCRIPTION - DESCRIPTORS
DESCRIPTORS: ACHING
DESCRIPTORS: ACHING

## 2024-10-16 ASSESSMENT — PAIN DESCRIPTION - ORIENTATION
ORIENTATION: RIGHT

## 2024-10-16 ASSESSMENT — PAIN DESCRIPTION - PAIN TYPE
TYPE: SURGICAL PAIN

## 2024-10-16 NOTE — ANESTHESIA PRE PROCEDURE
food consumption: 10/15/24    BMI:   Wt Readings from Last 3 Encounters:   10/16/24 69.9 kg (154 lb)   10/07/24 69.9 kg (154 lb)   10/03/24 69.9 kg (154 lb)     Body mass index is 31.09 kg/m².    CBC:   Lab Results   Component Value Date/Time    WBC 6.5 10/03/2024 11:07 AM    RBC 4.81 10/03/2024 11:07 AM    HGB 13.9 10/03/2024 11:07 AM    HCT 41.3 10/03/2024 11:07 AM    MCV 85.9 10/03/2024 11:07 AM    RDW 13.2 10/03/2024 11:07 AM     10/03/2024 11:07 AM       CMP:   Lab Results   Component Value Date/Time     10/03/2024 11:07 AM    K 3.3 10/03/2024 11:07 AM     10/03/2024 11:07 AM    CO2 31 10/03/2024 11:07 AM    BUN 19 10/03/2024 11:07 AM    CREATININE 1.30 10/03/2024 11:07 AM    GFRAA 51 08/26/2021 02:47 AM    AGRATIO 1.1 11/16/2022 10:06 AM    LABGLOM 40 10/03/2024 11:07 AM    LABGLOM 51 11/17/2023 12:59 PM    LABGLOM 43 12/22/2022 04:10 AM    GLUCOSE 237 10/03/2024 11:07 AM    CALCIUM 9.7 10/03/2024 11:07 AM    BILITOT 0.9 05/24/2024 03:38 PM    ALKPHOS 105 05/24/2024 03:38 PM    ALKPHOS 119 11/16/2022 10:06 AM    AST 21 05/24/2024 03:38 PM    ALT 26 05/24/2024 03:38 PM       POC Tests: No results for input(s): \"POCGLU\", \"POCNA\", \"POCK\", \"POCCL\", \"POCBUN\", \"POCHEMO\", \"POCHCT\" in the last 72 hours.    Coags:   Lab Results   Component Value Date/Time    PROTIME 10.2 10/03/2024 11:07 AM    INR 1.0 10/03/2024 11:07 AM       HCG (If Applicable): No results found for: \"PREGTESTUR\", \"PREGSERUM\", \"HCG\", \"HCGQUANT\"     ABGs: No results found for: \"PHART\", \"PO2ART\", \"GEQ4LTT\", \"HNE4BDN\", \"BEART\", \"Q0NJHPMY\"     Type & Screen (If Applicable):  Lab Results   Component Value Date    ABORH O POSITIVE 10/03/2024    LABANTI NEG 10/03/2024       Drug/Infectious Status (If Applicable):  No results found for: \"HIV\", \"HEPCAB\"    COVID-19 Screening (If Applicable): No results found for: \"COVID19\"        Anesthesia Evaluation  Patient summary reviewed and Nursing notes reviewed   no history of anesthetic

## 2024-10-16 NOTE — OP NOTE
structures, blood clots, pulmonary embolism, and death were discussed. The patient understood understands the increased risk for perioperative medical complications due to their medical comorbidities.      Intra-operative ROM revealed 10 degrees flexion contracture and 12 degrees valgus  ROM with trials in place revealed 0 degrees flexion contracture and 4 degrees valgus    DESCRIPTION OF PROCEDURE:DESCRIPTION OF PROCEDURE: Epidural/spinal anesthesia was initiated. Two grams of cefazolin were administered within 30 minutes of incision.  The right lower extremity was prepped and draped in the usual sterile fashion. The skin was covered with Ioban occlusive dressing. A tourniquet was not used.    A midline skin incision was made with a 10 blade and small flaps were elevated. A SUBVASTUS arthrotomy was made to the knee. I released the ACL and menisci and performed a limited medial release.  I then obtained all anatomic landmarks using the [employer] system. The tibia was subluxed and I carried out a tibial resection with approximately 2-3 mm from the low side . The meniscal remnants were removed.  I then placed the tensor  and took the knee through a range of motion. I utlized the balance curve to modify our femoral cuts.  Anterior, posterior, and chamfer cuts were carried out using the [employer] robot. I then prepared the femur for the planned size and drilled lug holes.   The tibial was then sized and correct rotation was identified using the medial 1/3 of the tibial tubercle as a landmark. The tibia was prepared using a drill followed by a keel punch.    Trials were placed. The patella was denervated. Lug holes were drilled and a trial was fitted. The knee tracked well with all trial implants. The trials were then removed. Bony surfaces were drilled, lavaged, and dried. All components were placed. Polyethylene component was placed. The knee was ranged and  irrigated copiously with dilute sterile betadine followed by

## 2024-10-16 NOTE — ANESTHESIA PROCEDURE NOTES
Spinal Block    End time: 10/16/2024 12:31 PM  Reason for block: primary anesthetic  Staffing  Performed: anesthesiologist   Performed by: Truong Evans III, APRN - CRNA  Authorized by: Wilbert Wilhelm MD    Spinal Block  Patient position: sitting  Prep: ChloraPrep and site prepped and draped  Patient monitoring: continuous pulse ox, continuous capnometry, frequent blood pressure checks and oxygen  Approach: midline  Location: L2/L3  Provider prep: sterile gloves and mask  Local infiltration: lidocaine  Preanesthetic Checklist  Completed: patient identified, IV checked, site marked, risks and benefits discussed, surgical/procedural consents, equipment checked, pre-op evaluation, timeout performed, anesthesia consent given, oxygen available, monitors applied/VS acknowledged, fire risk safety assessment completed and verbalized and blood product R/B/A discussed and consented

## 2024-10-16 NOTE — ANESTHESIA POSTPROCEDURE EVALUATION
Department of Anesthesiology  Postprocedure Note    Patient: Lety Banerjee  MRN: [id number]  YOB: 1940  Date of evaluation: 10/16/2024    Procedure Summary       Date: 10/16/24 Room / Location: [facility] MAIN OR  / [facility] MAIN OR    Anesthesia Start: 1206 Anesthesia Stop: 1406    Procedure: RIGHT TOTAL KNEE ARTHROPLASTY (VELYS) (Right: Knee) Diagnosis:       Arthritis of right knee      (Arthritis of right knee [M17.11])    Providers: Michael Harp MD Responsible Provider: Wilbert Wilhelm MD    Anesthesia Type: MAC, Spinal ASA Status: 3            Anesthesia Type: MAC, Spinal    Jorge Phase I: Jorge Score: 8    Jorge Phase II:      Anesthesia Post Evaluation    Patient location during evaluation: PACU  Patient participation: complete - patient participated  Level of consciousness: responsive to verbal stimuli and sleepy but conscious  Pain score: 2  Airway patency: patent  Cardiovascular status: blood pressure returned to baseline  Respiratory status: acceptable  Hydration status: stable  Comments: +Post-Anesthesia Evaluation and Assessment    Patient: Lety Banerjee MRN: [id number]  SSN: [id number]   YOB: 1940  Age: 84 y.o.  Sex: female          Cardiovascular Function/Vital Signs    BP (!) 139/58   Pulse 73   Temp 97.7 °F (36.5 °C) (Oral)   Resp 26   Ht 1.499 m (4' 11.02\")   Wt 69.9 kg (154 lb)   SpO2 100%   BMI 31.09 kg/m²     Patient is status post Procedure(s):  RIGHT TOTAL KNEE ARTHROPLASTY (VELYS).    Nausea/Vomiting: Controlled.    Postoperative hydration reviewed and adequate.    Pain:      Managed.    Neurological Status:       At baseline.    Mental Status and Level of Consciousness: Arousable.    Pulmonary Status:       Adequate oxygenation and airway patent.    Complications related to anesthesia: None    Post-anesthesia assessment completed. No concerns.    I have evaluated the patient and the patient is stable and ready to be discharged from PACU

## 2024-10-16 NOTE — PERIOP NOTE
TRANSFER - OUT REPORT:    Verbal report given to Elvira(name) on Lety Banerjee  being transferred to [facility](unit) for routine post-op       Report consisted of patient’s Situation, Background, Assessment and   Recommendations(SBAR).     Time Pre op antibiotic given:1240  Anesthesia Stop time: 1406    Information from the following report(s) SBAR, OR Summary, Intake/Output, MAR, Accordion, and Recent Results was reviewed with the receiving nurse.    Opportunity for questions and clarification was provided.     Is the patient on 02? Yes       L/Min 2       Other     Is the patient on a monitor? No    Is the nurse transporting with the patient? No    Surgical Waiting Area notified of patient's transfer from PACU? Yes      Clothes to floor with pt

## 2024-10-16 NOTE — H&P
Update History & Physical    The patient's History and Physical was reviewed with the patient and I examined the patient. There was no change. The surgical site was confirmed by the patient and me.       Plan: The risks, benefits, expected outcome, and alternative to the recommended procedure have been discussed with the patient. Patient understands and wants to proceed with the procedure.     Electronically signed by Michael Harp MD on 10/16/2024 at 11:09 AM

## 2024-10-16 NOTE — FLOWSHEET NOTE
10/16/24 1250   Family Communication    Phone Number Maria Del Rosario Banerjee [phone]   Family/Significant Other Update Called   Delivery Origin Surgeon   Message Disposition Family present - message delivered   Update Given Yes   Family Communication   Family Update Message Procedure started

## 2024-10-17 LAB
ANION GAP SERPL CALC-SCNC: 6 MMOL/L (ref 2–12)
BUN SERPL-MCNC: 13 MG/DL (ref 6–20)
BUN/CREAT SERPL: 14 (ref 12–20)
CALCIUM SERPL-MCNC: 9.2 MG/DL (ref 8.5–10.1)
CHLORIDE SERPL-SCNC: 106 MMOL/L (ref 97–108)
CO2 SERPL-SCNC: 25 MMOL/L (ref 21–32)
CREAT SERPL-MCNC: 0.96 MG/DL (ref 0.55–1.02)
GLUCOSE BLD STRIP.AUTO-MCNC: 117 MG/DL (ref 65–117)
GLUCOSE BLD STRIP.AUTO-MCNC: 125 MG/DL (ref 65–117)
GLUCOSE BLD STRIP.AUTO-MCNC: 209 MG/DL (ref 65–117)
GLUCOSE BLD STRIP.AUTO-MCNC: 89 MG/DL (ref 65–117)
GLUCOSE SERPL-MCNC: 84 MG/DL (ref 65–100)
HCT VFR BLD AUTO: 40.6 % (ref 35–47)
HGB BLD-MCNC: 13.7 G/DL (ref 11.5–16)
POTASSIUM SERPL-SCNC: ABNORMAL MMOL/L (ref 3.5–5.1)
SERVICE CMNT-IMP: ABNORMAL
SERVICE CMNT-IMP: ABNORMAL
SERVICE CMNT-IMP: NORMAL
SERVICE CMNT-IMP: NORMAL
SODIUM SERPL-SCNC: 137 MMOL/L (ref 136–145)

## 2024-10-17 PROCEDURE — 94760 N-INVAS EAR/PLS OXIMETRY 1: CPT

## 2024-10-17 PROCEDURE — 2700000000 HC OXYGEN THERAPY PER DAY

## 2024-10-17 PROCEDURE — G0378 HOSPITAL OBSERVATION PER HR: HCPCS

## 2024-10-17 PROCEDURE — 97165 OT EVAL LOW COMPLEX 30 MIN: CPT

## 2024-10-17 PROCEDURE — 97530 THERAPEUTIC ACTIVITIES: CPT | Performed by: PHYSICAL THERAPIST

## 2024-10-17 PROCEDURE — 80048 BASIC METABOLIC PNL TOTAL CA: CPT

## 2024-10-17 PROCEDURE — 97116 GAIT TRAINING THERAPY: CPT | Performed by: PHYSICAL THERAPIST

## 2024-10-17 PROCEDURE — 99221 1ST HOSP IP/OBS SF/LOW 40: CPT | Performed by: INTERNAL MEDICINE

## 2024-10-17 PROCEDURE — 85014 HEMATOCRIT: CPT

## 2024-10-17 PROCEDURE — 97161 PT EVAL LOW COMPLEX 20 MIN: CPT | Performed by: PHYSICAL THERAPIST

## 2024-10-17 PROCEDURE — 6360000002 HC RX W HCPCS: Performed by: PHYSICIAN ASSISTANT

## 2024-10-17 PROCEDURE — 82962 GLUCOSE BLOOD TEST: CPT

## 2024-10-17 PROCEDURE — 1100000000 HC RM PRIVATE

## 2024-10-17 PROCEDURE — 2580000003 HC RX 258: Performed by: PHYSICIAN ASSISTANT

## 2024-10-17 PROCEDURE — 97535 SELF CARE MNGMENT TRAINING: CPT

## 2024-10-17 PROCEDURE — 6370000000 HC RX 637 (ALT 250 FOR IP): Performed by: PHYSICIAN ASSISTANT

## 2024-10-17 PROCEDURE — 85018 HEMOGLOBIN: CPT

## 2024-10-17 RX ORDER — FAMOTIDINE 20 MG/1
10 TABLET, FILM COATED ORAL DAILY
Status: DISCONTINUED | OUTPATIENT
Start: 2024-10-18 | End: 2024-10-21 | Stop reason: HOSPADM

## 2024-10-17 RX ORDER — GLIPIZIDE 5 MG/1
10 TABLET ORAL
Status: DISCONTINUED | OUTPATIENT
Start: 2024-10-18 | End: 2024-10-21 | Stop reason: HOSPADM

## 2024-10-17 RX ORDER — SENNA AND DOCUSATE SODIUM 50; 8.6 MG/1; MG/1
1 TABLET, FILM COATED ORAL DAILY PRN
Status: DISCONTINUED | OUTPATIENT
Start: 2024-10-17 | End: 2024-10-21 | Stop reason: HOSPADM

## 2024-10-17 RX ORDER — POLYETHYLENE GLYCOL 3350 17 G/17G
17 POWDER, FOR SOLUTION ORAL DAILY PRN
Status: DISCONTINUED | OUTPATIENT
Start: 2024-10-17 | End: 2024-10-21 | Stop reason: HOSPADM

## 2024-10-17 RX ADMIN — SENNOSIDES AND DOCUSATE SODIUM 1 TABLET: 50; 8.6 TABLET ORAL at 08:48

## 2024-10-17 RX ADMIN — SODIUM CHLORIDE, PRESERVATIVE FREE 10 ML: 5 INJECTION INTRAVENOUS at 21:39

## 2024-10-17 RX ADMIN — POLYETHYLENE GLYCOL 3350 17 G: 17 POWDER, FOR SOLUTION ORAL at 08:47

## 2024-10-17 RX ADMIN — APIXABAN 2.5 MG: 5 TABLET, FILM COATED ORAL at 21:27

## 2024-10-17 RX ADMIN — ACETAMINOPHEN 650 MG: 325 TABLET ORAL at 12:05

## 2024-10-17 RX ADMIN — ATORVASTATIN CALCIUM 80 MG: 40 TABLET, FILM COATED ORAL at 21:27

## 2024-10-17 RX ADMIN — GLIPIZIDE 10 MG: 5 TABLET ORAL at 06:23

## 2024-10-17 RX ADMIN — ACETAMINOPHEN 650 MG: 325 TABLET ORAL at 06:23

## 2024-10-17 RX ADMIN — ACETAMINOPHEN 650 MG: 325 TABLET ORAL at 17:41

## 2024-10-17 RX ADMIN — WATER 2000 MG: 1 INJECTION INTRAMUSCULAR; INTRAVENOUS; SUBCUTANEOUS at 00:51

## 2024-10-17 RX ADMIN — APIXABAN 2.5 MG: 5 TABLET, FILM COATED ORAL at 08:48

## 2024-10-17 RX ADMIN — AMLODIPINE BESYLATE 10 MG: 5 TABLET ORAL at 08:48

## 2024-10-17 RX ADMIN — WATER 2000 MG: 1 INJECTION INTRAMUSCULAR; INTRAVENOUS; SUBCUTANEOUS at 12:06

## 2024-10-17 RX ADMIN — TRAMADOL HYDROCHLORIDE 50 MG: 50 TABLET ORAL at 21:31

## 2024-10-17 RX ADMIN — TRAMADOL HYDROCHLORIDE 50 MG: 50 TABLET ORAL at 13:52

## 2024-10-17 RX ADMIN — LEVOTHYROXINE SODIUM 75 MCG: 0.03 TABLET ORAL at 06:23

## 2024-10-17 RX ADMIN — VALSARTAN 320 MG: 160 TABLET, FILM COATED ORAL at 08:48

## 2024-10-17 RX ADMIN — FAMOTIDINE 10 MG: 20 TABLET, FILM COATED ORAL at 08:48

## 2024-10-17 RX ADMIN — HYDROCHLOROTHIAZIDE 12.5 MG: 25 TABLET ORAL at 08:48

## 2024-10-17 ASSESSMENT — PAIN SCALES - GENERAL
PAINLEVEL_OUTOF10: 4
PAINLEVEL_OUTOF10: 5
PAINLEVEL_OUTOF10: 6
PAINLEVEL_OUTOF10: 5

## 2024-10-17 ASSESSMENT — PAIN DESCRIPTION - LOCATION
LOCATION: LEG
LOCATION: LEG

## 2024-10-17 ASSESSMENT — PAIN DESCRIPTION - ORIENTATION
ORIENTATION: RIGHT
ORIENTATION: RIGHT

## 2024-10-17 NOTE — CONSULTS
This is a delightful 84-year-old woman with a history of type 2 diabetes mellitus since 2006 and degenerative joint disease status post bilateral hip replacement admitted to [facility] for right knee replacement which took place on October 16, 2024.  [facility] is asked to assist in glucose management.    The patient tells me that she has had diabetes since 2006 and has been on glipizide 10 mg BID for about 10 years.  She is admitted with an A1c of 6.4%.  She checks her blood sugar daily and it usually ranges between 90 and 140.  Breakfast is oatmeal and fruit.  She does not eat lunch.  Dinner is a protein such as fish or chicken with potatoes or rice.  She occasionally has plantains which are either fried or roasted or boiled.  She does not snack at night.  She denies episodes of hypoglycemia.    Examination  This is an alert and oriented woman in no acute distress  Blood pressure 140/61  Pulse 64  Afebrile  100% on 2 L nasal cannula    Recent laboratory data  Glucose 117 (range )  Creatinine 0.96  Bicarb 25    Impression  1.  Type 2 diabetes mellitus with excellent glucose control on glipizide 10 mg daily  2.  Degenerative joint disease status post total right knee replacement    Plan:  1.  I would continue the 10 mg of glipizide BID which has worked well for her and is anticipated to be her discharge medication  2.  The program for diabetes health will continue to follow with you  3.  Rest per team    Before making these care recommendations, I personally reviewed the hospitalization record, including notes, laboratory & diagnostic data and current medications, and examined the patient at the bedside. Total time  40   minutes,.      Please note that this dictation was completed with [email or web address], the [employer] voice recognition software.  Quite often unanticipated grammatical, syntax, homophones, and other interpretive errors are inadvertently transcribed by the computer software.

## 2024-10-18 PROBLEM — Z96.651 S/P TKR (TOTAL KNEE REPLACEMENT), RIGHT: Status: ACTIVE | Noted: 2024-10-18

## 2024-10-18 LAB
GLUCOSE BLD STRIP.AUTO-MCNC: 109 MG/DL (ref 65–117)
GLUCOSE BLD STRIP.AUTO-MCNC: 114 MG/DL (ref 65–117)
GLUCOSE BLD STRIP.AUTO-MCNC: 117 MG/DL (ref 65–117)
GLUCOSE BLD STRIP.AUTO-MCNC: 130 MG/DL (ref 65–117)
SERVICE CMNT-IMP: ABNORMAL
SERVICE CMNT-IMP: NORMAL

## 2024-10-18 PROCEDURE — 97116 GAIT TRAINING THERAPY: CPT

## 2024-10-18 PROCEDURE — APPNB60 APP NON BILLABLE TIME 46-60 MINS: Performed by: NURSE PRACTITIONER

## 2024-10-18 PROCEDURE — 1100000000 HC RM PRIVATE

## 2024-10-18 PROCEDURE — 82962 GLUCOSE BLOOD TEST: CPT

## 2024-10-18 PROCEDURE — 6370000000 HC RX 637 (ALT 250 FOR IP): Performed by: PHYSICIAN ASSISTANT

## 2024-10-18 PROCEDURE — 97530 THERAPEUTIC ACTIVITIES: CPT

## 2024-10-18 PROCEDURE — 2580000003 HC RX 258: Performed by: PHYSICIAN ASSISTANT

## 2024-10-18 PROCEDURE — 6370000000 HC RX 637 (ALT 250 FOR IP): Performed by: NURSE PRACTITIONER

## 2024-10-18 PROCEDURE — G0378 HOSPITAL OBSERVATION PER HR: HCPCS

## 2024-10-18 PROCEDURE — 99231 SBSQ HOSP IP/OBS SF/LOW 25: CPT | Performed by: CLINICAL NURSE SPECIALIST

## 2024-10-18 PROCEDURE — 97535 SELF CARE MNGMENT TRAINING: CPT

## 2024-10-18 RX ORDER — TRAMADOL HYDROCHLORIDE 50 MG/1
50 TABLET ORAL EVERY 6 HOURS PRN
Qty: 28 TABLET | Refills: 0 | Status: SHIPPED | OUTPATIENT
Start: 2024-10-18 | End: 2024-10-21

## 2024-10-18 RX ORDER — TRAMADOL HYDROCHLORIDE 50 MG/1
50 TABLET ORAL EVERY 6 HOURS PRN
Qty: 28 TABLET | Refills: 0 | Status: SHIPPED | OUTPATIENT
Start: 2024-10-18 | End: 2024-10-18

## 2024-10-18 RX ORDER — SENNA AND DOCUSATE SODIUM 50; 8.6 MG/1; MG/1
1 TABLET, FILM COATED ORAL DAILY PRN
Qty: 7 TABLET | Refills: 0 | Status: SHIPPED
Start: 2024-10-18 | End: 2024-10-21

## 2024-10-18 RX ORDER — POLYETHYLENE GLYCOL 3350 17 G/17G
17 POWDER, FOR SOLUTION ORAL DAILY PRN
Qty: 7 PACKET | Refills: 0 | Status: SHIPPED
Start: 2024-10-18 | End: 2024-10-21

## 2024-10-18 RX ADMIN — APIXABAN 2.5 MG: 5 TABLET, FILM COATED ORAL at 08:44

## 2024-10-18 RX ADMIN — GLIPIZIDE 10 MG: 5 TABLET ORAL at 17:09

## 2024-10-18 RX ADMIN — SODIUM CHLORIDE, PRESERVATIVE FREE 10 ML: 5 INJECTION INTRAVENOUS at 21:14

## 2024-10-18 RX ADMIN — APIXABAN 2.5 MG: 5 TABLET, FILM COATED ORAL at 21:14

## 2024-10-18 RX ADMIN — ACETAMINOPHEN 650 MG: 325 TABLET ORAL at 23:09

## 2024-10-18 RX ADMIN — VALSARTAN 320 MG: 160 TABLET, FILM COATED ORAL at 08:43

## 2024-10-18 RX ADMIN — ATORVASTATIN CALCIUM 80 MG: 40 TABLET, FILM COATED ORAL at 21:14

## 2024-10-18 RX ADMIN — ACETAMINOPHEN 650 MG: 325 TABLET ORAL at 17:09

## 2024-10-18 RX ADMIN — GLIPIZIDE 10 MG: 5 TABLET ORAL at 06:36

## 2024-10-18 RX ADMIN — HYDROCHLOROTHIAZIDE 12.5 MG: 25 TABLET ORAL at 08:43

## 2024-10-18 RX ADMIN — FAMOTIDINE 10 MG: 20 TABLET, FILM COATED ORAL at 08:44

## 2024-10-18 RX ADMIN — SODIUM CHLORIDE, PRESERVATIVE FREE 10 ML: 5 INJECTION INTRAVENOUS at 08:44

## 2024-10-18 RX ADMIN — LEVOTHYROXINE SODIUM 75 MCG: 0.03 TABLET ORAL at 06:36

## 2024-10-18 RX ADMIN — TRAMADOL HYDROCHLORIDE 50 MG: 50 TABLET ORAL at 06:35

## 2024-10-18 RX ADMIN — ACETAMINOPHEN 650 MG: 325 TABLET ORAL at 11:03

## 2024-10-18 RX ADMIN — AMLODIPINE BESYLATE 10 MG: 5 TABLET ORAL at 08:44

## 2024-10-18 ASSESSMENT — PAIN SCALES - GENERAL
PAINLEVEL_OUTOF10: 5
PAINLEVEL_OUTOF10: 3
PAINLEVEL_OUTOF10: 5
PAINLEVEL_OUTOF10: 0
PAINLEVEL_OUTOF10: 0

## 2024-10-18 ASSESSMENT — PAIN DESCRIPTION - ORIENTATION
ORIENTATION: RIGHT

## 2024-10-18 ASSESSMENT — PAIN DESCRIPTION - LOCATION
LOCATION: KNEE
LOCATION: LEG
LOCATION: LEG

## 2024-10-18 ASSESSMENT — PAIN DESCRIPTION - DESCRIPTORS: DESCRIPTORS: ACHING

## 2024-10-18 NOTE — CONSULTS
This is a delightful 84-year-old woman with a history of type 2 diabetes mellitus since 2006 and degenerative joint disease status post bilateral hip replacement admitted to [facility] for right knee replacement which took place on October 16, 2024.  [facility] is asked to assist in glucose management.    The patient tells me that she has had diabetes since 2006 and has been on glipizide 10 mg BID for about 10 years.  She is admitted with an A1c of 6.4%.  She checks her blood sugar daily and it usually ranges between 90 and 140.  Breakfast is oatmeal and fruit.  She does not eat lunch.  Dinner is a protein such as fish or chicken with potatoes or rice.  She occasionally has plantains which are either fried or roasted or boiled.  She does not snack at night.  She denies episodes of hypoglycemia. We have continued this strategy while here with good glycemic response, please continue.     Examination  This is an alert and oriented woman in no acute distress  Vitals:    10/18/24 0815   BP: (!) 155/66   Pulse: 77   Resp: 14   Temp: 98.8 °F (37.1 °C)   SpO2: 97%     Recent laboratory data  Glucose 130 (range 117-209)    Impression  1.  Type 2 diabetes mellitus with excellent glucose control on glipizide 10 mg daily  2.  Degenerative joint disease status post total right knee replacement    Plan:  1.  I would continue the 10 mg of glipizide BID which has worked well for her and upon discharge.  Diabetes Management Team to sign off at this point as patient's blood glucose remains stable.  Please re-consult us if patient needs change.  Thank you for including us in her care.     Before making these care recommendations, I personally reviewed the hospitalization record, including notes, laboratory & diagnostic data and current medications, and examined the patient at the bedside. Total time  25 minutes.    Please note that this dictation was completed with [employer], the computer voice recognition

## 2024-10-19 LAB
GLUCOSE BLD STRIP.AUTO-MCNC: 146 MG/DL (ref 65–117)
GLUCOSE BLD STRIP.AUTO-MCNC: 162 MG/DL (ref 65–117)
GLUCOSE BLD STRIP.AUTO-MCNC: 181 MG/DL (ref 65–117)
GLUCOSE BLD STRIP.AUTO-MCNC: 64 MG/DL (ref 65–117)
GLUCOSE BLD STRIP.AUTO-MCNC: 77 MG/DL (ref 65–117)
SERVICE CMNT-IMP: ABNORMAL
SERVICE CMNT-IMP: NORMAL

## 2024-10-19 PROCEDURE — 1100000000 HC RM PRIVATE

## 2024-10-19 PROCEDURE — 97530 THERAPEUTIC ACTIVITIES: CPT

## 2024-10-19 PROCEDURE — G0378 HOSPITAL OBSERVATION PER HR: HCPCS

## 2024-10-19 PROCEDURE — 6370000000 HC RX 637 (ALT 250 FOR IP): Performed by: PHYSICIAN ASSISTANT

## 2024-10-19 PROCEDURE — 97116 GAIT TRAINING THERAPY: CPT

## 2024-10-19 PROCEDURE — 2580000003 HC RX 258: Performed by: PHYSICIAN ASSISTANT

## 2024-10-19 PROCEDURE — 6370000000 HC RX 637 (ALT 250 FOR IP): Performed by: NURSE PRACTITIONER

## 2024-10-19 PROCEDURE — 82962 GLUCOSE BLOOD TEST: CPT

## 2024-10-19 RX ADMIN — FAMOTIDINE 10 MG: 20 TABLET, FILM COATED ORAL at 08:44

## 2024-10-19 RX ADMIN — ACETAMINOPHEN 650 MG: 325 TABLET ORAL at 11:06

## 2024-10-19 RX ADMIN — APIXABAN 2.5 MG: 5 TABLET, FILM COATED ORAL at 20:53

## 2024-10-19 RX ADMIN — ACETAMINOPHEN 650 MG: 325 TABLET ORAL at 05:46

## 2024-10-19 RX ADMIN — LEVOTHYROXINE SODIUM 75 MCG: 0.03 TABLET ORAL at 05:47

## 2024-10-19 RX ADMIN — AMLODIPINE BESYLATE 10 MG: 5 TABLET ORAL at 08:44

## 2024-10-19 RX ADMIN — APIXABAN 2.5 MG: 5 TABLET, FILM COATED ORAL at 08:44

## 2024-10-19 RX ADMIN — SODIUM CHLORIDE, PRESERVATIVE FREE 10 ML: 5 INJECTION INTRAVENOUS at 22:27

## 2024-10-19 RX ADMIN — VALSARTAN 320 MG: 160 TABLET, FILM COATED ORAL at 08:43

## 2024-10-19 RX ADMIN — GLIPIZIDE 5 MG: 5 TABLET ORAL at 17:15

## 2024-10-19 RX ADMIN — HYDROCHLOROTHIAZIDE 12.5 MG: 25 TABLET ORAL at 08:43

## 2024-10-19 RX ADMIN — TRAMADOL HYDROCHLORIDE 50 MG: 50 TABLET ORAL at 11:06

## 2024-10-19 RX ADMIN — ACETAMINOPHEN 650 MG: 325 TABLET ORAL at 17:15

## 2024-10-19 RX ADMIN — ACETAMINOPHEN 650 MG: 325 TABLET ORAL at 23:47

## 2024-10-19 RX ADMIN — SODIUM CHLORIDE, PRESERVATIVE FREE 10 ML: 5 INJECTION INTRAVENOUS at 08:44

## 2024-10-19 RX ADMIN — ATORVASTATIN CALCIUM 80 MG: 40 TABLET, FILM COATED ORAL at 20:52

## 2024-10-19 RX ADMIN — TRAMADOL HYDROCHLORIDE 50 MG: 50 TABLET ORAL at 01:40

## 2024-10-19 ASSESSMENT — PAIN DESCRIPTION - ORIENTATION
ORIENTATION: RIGHT

## 2024-10-19 ASSESSMENT — PAIN DESCRIPTION - LOCATION
LOCATION: KNEE
LOCATION: LEG
LOCATION: LEG
LOCATION: KNEE

## 2024-10-19 ASSESSMENT — PAIN SCALES - GENERAL
PAINLEVEL_OUTOF10: 0
PAINLEVEL_OUTOF10: 0
PAINLEVEL_OUTOF10: 3
PAINLEVEL_OUTOF10: 0
PAINLEVEL_OUTOF10: 5
PAINLEVEL_OUTOF10: 5
PAINLEVEL_OUTOF10: 0
PAINLEVEL_OUTOF10: 3
PAINLEVEL_OUTOF10: 4

## 2024-10-19 ASSESSMENT — PAIN DESCRIPTION - DESCRIPTORS
DESCRIPTORS: SORE
DESCRIPTORS: ACHING

## 2024-10-20 LAB
GLUCOSE BLD STRIP.AUTO-MCNC: 155 MG/DL (ref 65–117)
GLUCOSE BLD STRIP.AUTO-MCNC: 192 MG/DL (ref 65–117)
GLUCOSE BLD STRIP.AUTO-MCNC: 239 MG/DL (ref 65–117)
GLUCOSE BLD STRIP.AUTO-MCNC: 99 MG/DL (ref 65–117)
SERVICE CMNT-IMP: ABNORMAL
SERVICE CMNT-IMP: NORMAL

## 2024-10-20 PROCEDURE — 6370000000 HC RX 637 (ALT 250 FOR IP): Performed by: NURSE PRACTITIONER

## 2024-10-20 PROCEDURE — 97530 THERAPEUTIC ACTIVITIES: CPT

## 2024-10-20 PROCEDURE — 1100000000 HC RM PRIVATE

## 2024-10-20 PROCEDURE — 6370000000 HC RX 637 (ALT 250 FOR IP): Performed by: PHYSICIAN ASSISTANT

## 2024-10-20 PROCEDURE — 2580000003 HC RX 258: Performed by: PHYSICIAN ASSISTANT

## 2024-10-20 PROCEDURE — G0378 HOSPITAL OBSERVATION PER HR: HCPCS

## 2024-10-20 PROCEDURE — 82962 GLUCOSE BLOOD TEST: CPT

## 2024-10-20 PROCEDURE — 97110 THERAPEUTIC EXERCISES: CPT

## 2024-10-20 RX ADMIN — ATORVASTATIN CALCIUM 80 MG: 40 TABLET, FILM COATED ORAL at 21:07

## 2024-10-20 RX ADMIN — AMLODIPINE BESYLATE 10 MG: 5 TABLET ORAL at 09:24

## 2024-10-20 RX ADMIN — SODIUM CHLORIDE, PRESERVATIVE FREE 10 ML: 5 INJECTION INTRAVENOUS at 09:30

## 2024-10-20 RX ADMIN — APIXABAN 2.5 MG: 5 TABLET, FILM COATED ORAL at 21:07

## 2024-10-20 RX ADMIN — FAMOTIDINE 10 MG: 20 TABLET, FILM COATED ORAL at 09:23

## 2024-10-20 RX ADMIN — ACETAMINOPHEN 650 MG: 325 TABLET ORAL at 17:15

## 2024-10-20 RX ADMIN — VALSARTAN 320 MG: 160 TABLET, FILM COATED ORAL at 09:24

## 2024-10-20 RX ADMIN — ACETAMINOPHEN 650 MG: 325 TABLET ORAL at 12:05

## 2024-10-20 RX ADMIN — ACETAMINOPHEN 650 MG: 325 TABLET ORAL at 06:06

## 2024-10-20 RX ADMIN — LEVOTHYROXINE SODIUM 75 MCG: 0.03 TABLET ORAL at 06:06

## 2024-10-20 RX ADMIN — HYDROCHLOROTHIAZIDE 12.5 MG: 25 TABLET ORAL at 09:23

## 2024-10-20 RX ADMIN — SODIUM CHLORIDE, PRESERVATIVE FREE 10 ML: 5 INJECTION INTRAVENOUS at 21:11

## 2024-10-20 RX ADMIN — TRAMADOL HYDROCHLORIDE 50 MG: 50 TABLET ORAL at 09:24

## 2024-10-20 RX ADMIN — TRAMADOL HYDROCHLORIDE 50 MG: 50 TABLET ORAL at 18:14

## 2024-10-20 RX ADMIN — GLIPIZIDE 10 MG: 5 TABLET ORAL at 17:15

## 2024-10-20 RX ADMIN — TRAMADOL HYDROCHLORIDE 50 MG: 50 TABLET ORAL at 04:38

## 2024-10-20 RX ADMIN — APIXABAN 2.5 MG: 5 TABLET, FILM COATED ORAL at 09:23

## 2024-10-20 ASSESSMENT — PAIN SCALES - GENERAL
PAINLEVEL_OUTOF10: 3
PAINLEVEL_OUTOF10: 4
PAINLEVEL_OUTOF10: 0
PAINLEVEL_OUTOF10: 6
PAINLEVEL_OUTOF10: 7
PAINLEVEL_OUTOF10: 4
PAINLEVEL_OUTOF10: 0

## 2024-10-20 ASSESSMENT — PAIN DESCRIPTION - DESCRIPTORS
DESCRIPTORS: ACHING
DESCRIPTORS: SORE

## 2024-10-20 ASSESSMENT — PAIN DESCRIPTION - ORIENTATION
ORIENTATION: RIGHT

## 2024-10-20 ASSESSMENT — PAIN DESCRIPTION - LOCATION
LOCATION: KNEE

## 2024-10-21 VITALS
BODY MASS INDEX: 31.04 KG/M2 | DIASTOLIC BLOOD PRESSURE: 71 MMHG | OXYGEN SATURATION: 97 % | TEMPERATURE: 98.2 F | HEART RATE: 93 BPM | WEIGHT: 154 LBS | SYSTOLIC BLOOD PRESSURE: 156 MMHG | HEIGHT: 59 IN | RESPIRATION RATE: 16 BRPM

## 2024-10-21 LAB
GLUCOSE BLD STRIP.AUTO-MCNC: 127 MG/DL (ref 65–117)
GLUCOSE BLD STRIP.AUTO-MCNC: 213 MG/DL (ref 65–117)
SERVICE CMNT-IMP: ABNORMAL
SERVICE CMNT-IMP: ABNORMAL

## 2024-10-21 PROCEDURE — G0378 HOSPITAL OBSERVATION PER HR: HCPCS

## 2024-10-21 PROCEDURE — 99024 POSTOP FOLLOW-UP VISIT: CPT | Performed by: PHYSICIAN ASSISTANT

## 2024-10-21 PROCEDURE — 97530 THERAPEUTIC ACTIVITIES: CPT

## 2024-10-21 PROCEDURE — 97535 SELF CARE MNGMENT TRAINING: CPT

## 2024-10-21 PROCEDURE — 97110 THERAPEUTIC EXERCISES: CPT

## 2024-10-21 PROCEDURE — 6370000000 HC RX 637 (ALT 250 FOR IP): Performed by: NURSE PRACTITIONER

## 2024-10-21 PROCEDURE — 82962 GLUCOSE BLOOD TEST: CPT

## 2024-10-21 PROCEDURE — 2580000003 HC RX 258: Performed by: PHYSICIAN ASSISTANT

## 2024-10-21 PROCEDURE — 6370000000 HC RX 637 (ALT 250 FOR IP): Performed by: PHYSICIAN ASSISTANT

## 2024-10-21 RX ORDER — SENNA AND DOCUSATE SODIUM 50; 8.6 MG/1; MG/1
1 TABLET, FILM COATED ORAL DAILY PRN
Qty: 7 TABLET | Refills: 0 | Status: SHIPPED | OUTPATIENT
Start: 2024-10-21 | End: 2024-10-28

## 2024-10-21 RX ORDER — TRAMADOL HYDROCHLORIDE 50 MG/1
50 TABLET ORAL EVERY 6 HOURS PRN
Qty: 28 TABLET | Refills: 0 | Status: SHIPPED | OUTPATIENT
Start: 2024-10-21 | End: 2024-10-28

## 2024-10-21 RX ORDER — POLYETHYLENE GLYCOL 3350 17 G/17G
17 POWDER, FOR SOLUTION ORAL DAILY PRN
Qty: 7 PACKET | Refills: 0 | Status: SHIPPED | OUTPATIENT
Start: 2024-10-21 | End: 2024-10-28

## 2024-10-21 RX ADMIN — SODIUM CHLORIDE, PRESERVATIVE FREE 10 ML: 5 INJECTION INTRAVENOUS at 09:04

## 2024-10-21 RX ADMIN — VALSARTAN 320 MG: 160 TABLET, FILM COATED ORAL at 09:03

## 2024-10-21 RX ADMIN — FAMOTIDINE 10 MG: 20 TABLET, FILM COATED ORAL at 09:03

## 2024-10-21 RX ADMIN — SENNOSIDES AND DOCUSATE SODIUM 1 TABLET: 50; 8.6 TABLET ORAL at 09:03

## 2024-10-21 RX ADMIN — POLYETHYLENE GLYCOL 3350 17 G: 17 POWDER, FOR SOLUTION ORAL at 09:02

## 2024-10-21 RX ADMIN — APIXABAN 2.5 MG: 5 TABLET, FILM COATED ORAL at 09:03

## 2024-10-21 RX ADMIN — GLIPIZIDE 10 MG: 5 TABLET ORAL at 09:03

## 2024-10-21 RX ADMIN — AMLODIPINE BESYLATE 10 MG: 5 TABLET ORAL at 09:04

## 2024-10-21 RX ADMIN — ACETAMINOPHEN 650 MG: 325 TABLET ORAL at 05:47

## 2024-10-21 RX ADMIN — ACETAMINOPHEN 650 MG: 325 TABLET ORAL at 11:18

## 2024-10-21 RX ADMIN — HYDROCHLOROTHIAZIDE 12.5 MG: 25 TABLET ORAL at 09:04

## 2024-10-21 RX ADMIN — LEVOTHYROXINE SODIUM 75 MCG: 0.03 TABLET ORAL at 05:47

## 2024-10-21 ASSESSMENT — PAIN SCALES - GENERAL
PAINLEVEL_OUTOF10: 0
PAINLEVEL_OUTOF10: 3
PAINLEVEL_OUTOF10: 0

## 2024-10-21 NOTE — PROGRESS NOTES
Ortho Daily Progress Note    Date of Surgery:  10/16/24     Patient: Lety Banerjee   YOB: 1940  Age: 84 y.o.      SUBJECTIVE:   5 Days Post-Op following RIGHT TOTAL KNEE ARTHROPLASTY ([facility])    The patient states moderate knee pain this morning, otherwise without complaint.  The patient denies CP, SOB, N/V.     OBJECTIVE:     Vital Signs:    Temp (24hrs), Av.4 °F (36.9 °C), Min:97.9 °F (36.6 °C), Max:98.6 °F (37 °C)    Patient Vitals for the past 24 hrs:   BP Temp Temp src Pulse Resp SpO2   10/21/24 0900 (!) 147/74 98.3 °F (36.8 °C) Oral 94 17 97 %   10/21/24 0230 124/68 98.6 °F (37 °C) Oral 80 16 96 %   10/20/24 2315 138/61 97.9 °F (36.6 °C) Oral 78 16 --   10/20/24 2000 -- -- -- -- -- 95 %   10/20/24 1626 (!) 143/63 98.6 °F (37 °C) Oral 80 16 94 %           Physical Exam:  General: A&Ox3, NAD.   Respiratory: Respirations are unlabored.  Abdomen: S/NT  Surgical site: C,D and I.  Musculoskeletal: Calves are S/NT, Supa dorsi/plantar flexion/EHL intact, distal pulses intact  Neurological:  Supa LE's NVI    Laboratory Values:             No results for input(s): \"HGB\", \"PLT\", \"INR\", \"GLU\" in the last 72 hours.    Invalid input(s): \"CREA\"  Lab Results   Component Value Date/Time     10/17/2024 05:12 AM    K HEMOLYZED,RECOLLECT REQUESTED 10/17/2024 05:12 AM     10/17/2024 05:12 AM    CO2 25 10/17/2024 05:12 AM    BUN 13 10/17/2024 05:12 AM         PLAN:     S/P RIGHT TOTAL KNEE ARTHROPLASTY (VELYS) WBAT. Mobilize with PT/nursing until discharged.      Hemodynamics Stable.     Wound Dressing changes PRN.     Post Operative Pain Oxycodone, Tylenol.     DVT Prophylaxis Eliquis 2.5 mg BID/ SCD's, encourage bed exercises, mobilize.       Discharge Disposition Awaiting Insurance Authorization for [facility]       Signed By:     Yuan Diaz PA-C  Physician Assistant    2024 10:47 AM    
    Pharmacy Note - Renal dose adjustment made per P/T protocol    Original order:  Cefazolin 2 gm IVP q 8 h x 2 postop doses    Estimated Creatinine Clearance: 29 mL/min (A) (based on SCr of 1.3 mg/dL (H)).    No results for input(s): \"BUN\", \"CREATININE\" in the last 72 hours.    Renally adjusted order:  Cefazolin 2 gm IVPB q 12 h x 2 doses    Please call inpatient pharmacy with any questions.    Thank you,  SARA MCARTHUR [facility] MS  10/16/2024 5:19 PM    
    Pharmacy Note - Renal dose adjustment made per P/T protocol    Original order:  Famotidine 20 mg PO/IV q 12 h    Estimated Creatinine Clearance: 29 mL/min (A) (based on SCr of 1.3 mg/dL (H)).    No results for input(s): \"BUN\", \"CREATININE\" in the last 72 hours.    Renally adjusted order:  Famotidine 20 mg PO/IV q 24 h    Please call inpatient pharmacy with any questions.    Thank you,  SARA MCARTHUR [facility] MS  10/16/2024 5:24 PM    
Ortho NP Note    POD# 1  s/p RIGHT TOTAL KNEE ARTHROPLASTY (VELYS)   Pt seen with no visitor.     Pt resting in bed comfortably. Very pleasant.   Reports minimal pain at rest, received tylenol overnight, aware of prn medications.   Late arrival to unit, has not work with PT yet. Able to take a few steps with RN last night   Tolerating regular diet. No nausea.   Postop plan reviewed - No complaints/concerns. Motivated to work with therapy.     VSS Afebrile.    Visit Vitals  BP (!) 140/61   Pulse 64   Temp 98.4 °F (36.9 °C) (Oral)   Resp 16   Ht 1.499 m (4' 11.02\")   Wt 69.9 kg (154 lb)   SpO2 100%   BMI 31.09 kg/m²       Voiding status: voiding/purwick         Labs    Lab Results   Component Value Date/Time    HGB 13.7 10/17/2024 05:12 AM      Lab Results   Component Value Date/Time    INR 1.0 10/03/2024 11:07 AM      Lab Results   Component Value Date/Time     10/17/2024 05:12 AM    K HEMOLYZED,RECOLLECT REQUESTED 10/17/2024 05:12 AM     10/17/2024 05:12 AM    CO2 25 10/17/2024 05:12 AM    BUN 13 10/17/2024 05:12 AM     Recent Glucose Results:   Glucose   Date Value Ref Range Status   10/17/2024 84 65 - 100 mg/dL Final   10/03/2024 237 (H) 65 - 100 mg/dL Final   05/24/2024 86 65 - 100 mg/dL Final           Body mass index is 31.09 kg/m². : A BMI > 30 is classified as obesity and > 40 is classified as morbid obesity.       Ace wrap removed. Dressing c.d.i  Cryotherapy in place over incision  Calves soft and supple; No pain with passive stretch  Sensation and motor intact. +PF/DF/EHL intact   SCDs for mechanical DVT proph while in bed     PLAN:  1) PT BID, OT - WBAT  2) Eliquis 2.5 mg PO BID for DVT Prophylaxis (Allergy to ASA). Encouraged early mobilization, bed exercises, and SCD use.  3) GI Prophylaxis - Pepcid    4) Pain control - scheduled tylenol, and prn  tramadol and oxycodone  .  5) Post op care - Continue bowel regimen, encouraged IS. Straight cath per protocol. Prineo to remain in place until 
Orthopedic Progress Note:    Subjective:  Patient seen and examined this AM. Discussed post operative plans in detail. Pain under control. Denies chest pain, shortness breath, nausea/vomiting.    Objective:  Vitals:    10/18/24 0815 10/18/24 1607 10/18/24 2007 10/19/24 0200   BP: (!) 155/66 (!) 143/64 (!) 150/67 (!) 157/74   Pulse: 77 75 89 82   Resp: 14 15 15 16   Temp: 98.8 °F (37.1 °C) 98.4 °F (36.9 °C) 98.8 °F (37.1 °C) 98 °F (36.7 °C)   TempSrc: Oral Oral Oral Oral   SpO2: 97% 96% 97% 95%   Weight:       Height:         Recent Labs     10/17/24  0512   HGB 13.7   HCT 40.6      K HEMOLYZED,RECOLLECT REQUESTED   BUN 13   CREATININE 0.96      Musculoskeletal:   RLE  Dressing c/d/i  SILT of distal extremity  Active dorsiflexion/plantarflexion and Toe flx/ext intact  Brisk capillary refill in all toes  DP and PT +2  Compartments soft and compressible    Assessment:   84 y.o. female s/p R TKA 10/16    Plan:  PT BID, OT - WBAT  Eliquis 2.5 mg PO BID for DVT Prophylaxis. Encouraged early mobilization, bed exercises, and SCD use.  GI Prophylaxis - Pepcid    Pain control - scheduled tylenol, and prn tramadol   Post op care - Continue bowel regimen, encouraged IS. Straight cath per protocol. Prineo to remain in place until follow up unless integrity is lost.    Awaiting placement, DC when set up    Tray Iodence, DO  Orthopaedic Surgery         
Orthopedic Progress Note:    Subjective:  Patient seen and examined this AM. Eating breakfast. Awaiting placement. Denies chest pain, shortness breath, nausea/vomiting.    Objective:  Vitals:    10/19/24 1136 10/19/24 1518 10/19/24 2045 10/20/24 0345   BP:  (!) 143/59 (!) 162/67 134/64   Pulse:  86 93 77   Resp: 16 12 16 16   Temp:   98.2 °F (36.8 °C) 98.2 °F (36.8 °C)   TempSrc:   Oral Oral   SpO2:  95% 99% 96%   Weight:       Height:         No results for input(s): \"WBC\", \"HGB\", \"HCT\", \"PLT\", \"NA\", \"K\", \"BUN\", \"CREATININE\", \"INR\" in the last 72 hours.     Musculoskeletal:   RLE  Dressing c/d/i  SILT of distal extremity  Active dorsiflexion/plantarflexion and Toe flx/ext intact  Brisk capillary refill in all toes  DP and PT +2  Compartments soft and compressible    Assessment:   84 y.o. female s/p R TKA 10/16    Plan:  PT BID, OT - WBAT  Eliquis 2.5 mg PO BID for DVT Prophylaxis. Encouraged early mobilization, bed exercises, and SCD use.  GI Prophylaxis - Pepcid    Pain control - scheduled tylenol, and prn tramadol   Post op care - Continue bowel regimen, encouraged IS. Straight cath per protocol. Prineo to remain in place until follow up unless integrity is lost.    Awaiting placement, DC when set up    Tray Cabrera, DO  Orthopaedic Surgery         
Pt was able to stand side of bed with assist x2 and walker   
Pt's bs 64 at 0555, 2 cups of apple juice were given. Nurse rechecked pt's bs 77  
Resting comfortably.       GEN:  NAD. AOx3   ABD:  S/NT/ND   RLE:  Dressing C/D/I , 5/5 motor, Calf nttp (Bilat), Sensation rossly intact to light touch throughout, 1+ dp/pt pulses, foot perfused    Patient Vitals for the past 24 hrs:   Temp Pulse Resp BP SpO2   10/16/24 1900 97.8 °F (36.6 °C) 82 16 (!) 147/62 97 %   10/16/24 1808 98 °F (36.7 °C) 83 16 (!) 163/75 98 %   10/16/24 1700 97.4 °F (36.3 °C) 87 20 (!) 164/76 97 %   10/16/24 1645 -- 89 20 (!) 164/60 --   10/16/24 1635 97.7 °F (36.5 °C) 83 15 (!) 162/58 98 %   10/16/24 1615 -- 76 16 (!) 147/61 99 %   10/16/24 1600 -- 75 14 (!) 160/59 100 %   10/16/24 1545 -- 75 12 (!) 159/57 100 %   10/16/24 1530 -- 81 15 (!) 161/55 100 %   10/16/24 1515 -- 83 25 (!) 157/67 100 %   10/16/24 1500 -- 78 26 (!) 137/59 100 %   10/16/24 1445 -- 81 19 (!) 149/55 100 %   10/16/24 1430 -- 77 20 (!) 139/56 100 %   10/16/24 1422 -- 77 30 (!) 144/63 --   10/16/24 1420 -- 76 15 (!) 144/63 --   10/16/24 1415 -- 75 13 (!) 140/52 100 %   10/16/24 1410 -- 73 26 (!) 139/58 100 %   10/16/24 1406 -- 70 28 (!) 120/48 100 %   10/16/24 1405 97.7 °F (36.5 °C) 70 18 (!) 122/49 100 %   10/16/24 1400 97.7 °F (36.5 °C) -- 13 (!) 122/49 100 %   10/16/24 1122 98.5 °F (36.9 °C) 75 16 (!) 163/64 97 %       Current Facility-Administered Medications   Medication Dose Route Frequency    [START ON 10/17/2024] amLODIPine (NORVASC) tablet 10 mg  10 mg Oral Daily    atorvastatin (LIPITOR) tablet 80 mg  80 mg Oral Nightly    [START ON 10/17/2024] glipiZIDE (GLUCOTROL) tablet 10 mg  10 mg Oral BID AC    hydroCHLOROthiazide (HYDRODIURIL) tablet 12.5 mg  12.5 mg Oral Daily    [START ON 10/17/2024] levothyroxine (SYNTHROID) tablet 75 mcg  75 mcg Oral QAM AC    valsartan (DIOVAN) tablet 320 mg  320 mg Oral QAM    insulin lispro (HUMALOG,ADMELOG) injection vial 0-8 Units  0-8 Units SubCUTAneous 4x Daily AC & HS    0.9 % sodium chloride infusion   IntraVENous Continuous    sodium chloride 0.9 % bolus 500 mL  500 mL 
Resting comfortably.       GEN:  NAD. AOx3   ABD:  S/NT/ND   RLE:  Dressing C/D/I , 5/5 motor, Calf nttp (Bilat), Sensation rossly intact to light touch throughout, 1+ dp/pt pulses, foot perfused    Patient Vitals for the past 24 hrs:   Temp Pulse Resp BP SpO2   10/17/24 0820 98.4 °F (36.9 °C) 64 16 (!) 140/61 100 %   10/17/24 0302 97.9 °F (36.6 °C) 73 16 (!) 150/74 100 %   10/16/24 2001 97.8 °F (36.6 °C) 67 16 134/69 100 %   10/16/24 1900 97.8 °F (36.6 °C) 82 16 (!) 147/62 97 %   10/16/24 1808 98 °F (36.7 °C) 83 16 (!) 163/75 98 %   10/16/24 1700 97.4 °F (36.3 °C) 87 20 (!) 164/76 97 %   10/16/24 1645 -- 89 20 (!) 164/60 --   10/16/24 1635 97.7 °F (36.5 °C) 83 15 (!) 162/58 98 %   10/16/24 1615 -- 76 16 (!) 147/61 99 %   10/16/24 1600 -- 75 14 (!) 160/59 100 %   10/16/24 1545 -- 75 12 (!) 159/57 100 %   10/16/24 1530 -- 81 15 (!) 161/55 100 %   10/16/24 1515 -- 83 25 (!) 157/67 100 %   10/16/24 1500 -- 78 26 (!) 137/59 100 %   10/16/24 1445 -- 81 19 (!) 149/55 100 %   10/16/24 1430 -- 77 20 (!) 139/56 100 %   10/16/24 1422 -- 77 30 (!) 144/63 --   10/16/24 1420 -- 76 15 (!) 144/63 --   10/16/24 1415 -- 75 13 (!) 140/52 100 %   10/16/24 1410 -- 73 26 (!) 139/58 100 %   10/16/24 1406 -- 70 28 (!) 120/48 100 %   10/16/24 1405 97.7 °F (36.5 °C) 70 18 (!) 122/49 100 %   10/16/24 1400 97.7 °F (36.5 °C) -- 13 (!) 122/49 100 %   10/16/24 1122 98.5 °F (36.9 °C) 75 16 (!) 163/64 97 %       Current Facility-Administered Medications   Medication Dose Route Frequency    amLODIPine (NORVASC) tablet 10 mg  10 mg Oral Daily    atorvastatin (LIPITOR) tablet 80 mg  80 mg Oral Nightly    [Held by provider] glipiZIDE (GLUCOTROL) tablet 10 mg  10 mg Oral BID AC    hydroCHLOROthiazide (HYDRODIURIL) tablet 12.5 mg  12.5 mg Oral Daily    levothyroxine (SYNTHROID) tablet 75 mcg  75 mcg Oral QAM AC    valsartan (DIOVAN) tablet 320 mg  320 mg Oral QAM    insulin lispro (HUMALOG,ADMELOG) injection vial 0-8 Units  0-8 Units SubCUTAneous 4x 
- Accepted by [facility], Medicaid auth pending. Medically stable for discharge. Prescription on chart.     BOSTON Brewster - NP

## 2024-10-21 NOTE — CARE COORDINATION
Transition of Care Plan:    RUR: 10%  Prior Level of Functioning: independent  Disposition: [facility] IPR  If SNF or IPR: Date FOC offered: 10/17  Date FOC received: 10/17  Accepting facility: [facility]  Date authorization started with reference number: [facility] started 10/18  Date authorization received and expires:   Follow up appointments: per physician recommendation  DME needed: Defer to IPR  Transportation at discharge: TBD  IM/IMM Medicare/ letter given: N/A  Caregiver Contact: daughter Maria Del Rosario [phone]  Barriers to discharge: auth    CM reviewed chart, noted: Patient lives with her daughter in a 2 level house with 5 steps to enter, 18 steps inside. Patient owns a rollator and RW. Confirms family will transport her home at discharge. FOC offered for HH, referrals pending.     [facility] accepted.     CM met with patient and daughter, they are requesting an IPR referral be sent to [facility]. Patient does not have a SNF benefit.     CM met with patient and family to provide discharge updates. CM requested auth be started with [facility].     Weekend CM please provide any discharge updates to family if any.    Insurance is offering a P2P - P2P can be setup within 5 days. Call [phone] option 2. CM sent info to Ortho NP.     Susanne Altman RN/CRM    
Transition of Care Plan:    RUR: 9%  Prior Level of Functioning: independent  Disposition: home with HH  If SNF or IPR: Date FOC offered: 10/17  Date FOC received: 10/17  Accepting facility: [facility]  Date authorization started with reference number: [facility] started 10/18- denied  Date authorization received and expires:   Follow up appointments: per physician recommendation  DME needed: N/A  Transportation at discharge: TBD  IM/IMM Medicare/ letter given: N/A  Caregiver Contact: daughter Maria Del Rosario [phone]    CM reviewed chart, noted: Patient lives with her daughter in a 2 level house with 5 steps to enter, 18 steps inside. Patient owns a rollator and RW. Confirms family will transport her home at discharge. FOC offered for HH, referrals pending.     [facility] accepted.     CM met with patient and daughter, they are requesting an IPR referral be sent to [facility]. Patient does not have a SNF benefit.     CM met with patient and family to provide discharge updates.     Insurance is offering a P2P - P2P can be setup within 5 days. Call [phone] option 2. CM sent info to Ortho NP.     11:50AM: Insurance has denied this patient IPR after the P2P was completed. CM met with patient and family to provide discharge updates. Patient will discharge home with HH, [facility] has been updated.     Patient and family are requesting Medicaid stretcher transportation for the patient to get home. CM will call [employer] [phone]  to request a stretcher ride once therapy clears for discharge.     CM called Medicaid to request stretcher transportation, Trip# [id number], CM requested a 1:30PM, there is a 3 hour window for . Patient aware.     Susanne Altman RN/CRM    
patient's individualized plan of care/goals, treatment preferences, and shares the quality data associated with the providers?  Yes     Susanne Altman RN/CRM

## 2024-10-21 NOTE — PLAN OF CARE
Problem: Chronic Conditions and Co-morbidities  Goal: Patient's chronic conditions and co-morbidity symptoms are monitored and maintained or improved  10/17/2024 0922 by Gabby Epperson RN  Outcome: Progressing  10/16/2024 2314 by Elle Fuentes LPN  Outcome: Progressing  Flowsheets  Taken 10/16/2024 2015 by Elle Fuentes LPN  Care Plan - Patient's Chronic Conditions and Co-Morbidity Symptoms are Monitored and Maintained or Improved: Monitor and assess patient's chronic conditions and comorbid symptoms for stability, deterioration, or improvement  Taken 10/16/2024 1820 by Elvira Poe RN  Care Plan - Patient's Chronic Conditions and Co-Morbidity Symptoms are Monitored and Maintained or Improved: Monitor and assess patient's chronic conditions and comorbid symptoms for stability, deterioration, or improvement     Problem: Safety - Adult  Goal: Free from fall injury  10/17/2024 0922 by Gabby Epperson RN  Outcome: Progressing  10/16/2024 2314 by Elle Fuentes LPN  Outcome: Progressing  Flowsheets (Taken 10/16/2024 1820 by Elviar Poe RN)  Free From Fall Injury: Instruct family/caregiver on patient safety     Problem: Skin/Tissue Integrity  Goal: Absence of new skin breakdown  Description: 1.  Monitor for areas of redness and/or skin breakdown  2.  Assess vascular access sites hourly  3.  Every 4-6 hours minimum:  Change oxygen saturation probe site  4.  Every 4-6 hours:  If on nasal continuous positive airway pressure, respiratory therapy assess nares and determine need for appliance change or resting period.  10/17/2024 0922 by Gabby Epperson RN  Outcome: Progressing  10/16/2024 2314 by Elle Fuentes LPN  Outcome: Progressing     Problem: Pain  Goal: Verbalizes/displays adequate comfort level or baseline comfort level  10/17/2024 0922 by Gabby Epperson RN  Outcome: Progressing  10/16/2024 2314 by Elle Fuentes LPN  Outcome: Progressing  Flowsheets  Taken 10/16/2024 1900 by Elvira Poe 
  Problem: Chronic Conditions and Co-morbidities  Goal: Patient's chronic conditions and co-morbidity symptoms are monitored and maintained or improved  10/18/2024 0825 by Gabby Epperson RN  Outcome: Progressing  10/18/2024 0046 by Elle Fuentes LPN  Outcome: Progressing     Problem: Safety - Adult  Goal: Free from fall injury  10/18/2024 0825 by Gabby Epperson RN  Outcome: Progressing  10/18/2024 0046 by Elle Fuentes LPN  Outcome: Progressing     Problem: Skin/Tissue Integrity  Goal: Absence of new skin breakdown  Description: 1.  Monitor for areas of redness and/or skin breakdown  2.  Assess vascular access sites hourly  3.  Every 4-6 hours minimum:  Change oxygen saturation probe site  4.  Every 4-6 hours:  If on nasal continuous positive airway pressure, respiratory therapy assess nares and determine need for appliance change or resting period.  10/18/2024 0825 by Gabby Epperson RN  Outcome: Progressing  10/18/2024 0046 by Elle Fuentes LPN  Outcome: Progressing     Problem: Pain  Goal: Verbalizes/displays adequate comfort level or baseline comfort level  10/18/2024 0825 by Gabby Epperson RN  Outcome: Progressing  10/18/2024 0046 by Elle Fuentes LPN  Outcome: Progressing     Problem: Discharge Planning  Goal: Discharge to home or other facility with appropriate resources  10/18/2024 0825 by Gabby Epperson RN  Outcome: Progressing  10/18/2024 0046 by Elle Fuentes LPN  Outcome: Progressing     Problem: ABCDS Injury Assessment  Goal: Absence of physical injury  10/18/2024 0825 by Gabby Epperson RN  Outcome: Progressing  10/18/2024 0046 by Elle Fuentes LPN  Outcome: Progressing     
  Problem: Chronic Conditions and Co-morbidities  Goal: Patient's chronic conditions and co-morbidity symptoms are monitored and maintained or improved  10/20/2024 1913 by Aroldo Irizarry RN  Outcome: Progressing  10/20/2024 1913 by Aroldo Irizarry RN  Outcome: Progressing  Flowsheets (Taken 10/20/2024 0800)  Care Plan - Patient's Chronic Conditions and Co-Morbidity Symptoms are Monitored and Maintained or Improved: Monitor and assess patient's chronic conditions and comorbid symptoms for stability, deterioration, or improvement     Problem: Safety - Adult  Goal: Free from fall injury  10/20/2024 1913 by Aroldo Irizarry RN  Outcome: Progressing  10/20/2024 1913 by Aroldo Irizarry RN  Outcome: Progressing     Problem: Skin/Tissue Integrity  Goal: Absence of new skin breakdown  Description: 1.  Monitor for areas of redness and/or skin breakdown  2.  Assess vascular access sites hourly  3.  Every 4-6 hours minimum:  Change oxygen saturation probe site  4.  Every 4-6 hours:  If on nasal continuous positive airway pressure, respiratory therapy assess nares and determine need for appliance change or resting period.  10/20/2024 1913 by Aroldo Irizarry RN  Outcome: Progressing  10/20/2024 1913 by Aroldo Irizarry RN  Outcome: Progressing     Problem: Pain  Goal: Verbalizes/displays adequate comfort level or baseline comfort level  10/20/2024 1913 by Aroldo Irizarry RN  Outcome: Progressing  10/20/2024 1913 by Aroldo Irizarry RN  Outcome: Progressing     Problem: Discharge Planning  Goal: Discharge to home or other facility with appropriate resources  10/20/2024 1913 by Aroldo Irizarry RN  Outcome: Progressing  10/20/2024 1913 by Aroldo Irizarry RN  Outcome: Progressing  Flowsheets (Taken 10/20/2024 0800)  Discharge to home or other facility with appropriate resources: Identify barriers to discharge with patient and caregiver     Problem: ABCDS Injury Assessment  Goal: Absence of physical 
  Problem: Chronic Conditions and Co-morbidities  Goal: Patient's chronic conditions and co-morbidity symptoms are monitored and maintained or improved  Outcome: Progressing     Problem: Safety - Adult  Goal: Free from fall injury  Outcome: Progressing     Problem: Skin/Tissue Integrity  Goal: Absence of new skin breakdown  Description: 1.  Monitor for areas of redness and/or skin breakdown  2.  Assess vascular access sites hourly  3.  Every 4-6 hours minimum:  Change oxygen saturation probe site  4.  Every 4-6 hours:  If on nasal continuous positive airway pressure, respiratory therapy assess nares and determine need for appliance change or resting period.  Outcome: Progressing     Problem: Discharge Planning  Goal: Discharge to home or other facility with appropriate resources  Outcome: Progressing     Problem: ABCDS Injury Assessment  Goal: Absence of physical injury  Outcome: Progressing     
  Problem: Chronic Conditions and Co-morbidities  Goal: Patient's chronic conditions and co-morbidity symptoms are monitored and maintained or improved  Outcome: Progressing     Problem: Safety - Adult  Goal: Free from fall injury  Outcome: Progressing     Problem: Skin/Tissue Integrity  Goal: Absence of new skin breakdown  Description: 1.  Monitor for areas of redness and/or skin breakdown  2.  Assess vascular access sites hourly  3.  Every 4-6 hours minimum:  Change oxygen saturation probe site  4.  Every 4-6 hours:  If on nasal continuous positive airway pressure, respiratory therapy assess nares and determine need for appliance change or resting period.  Outcome: Progressing     Problem: Pain  Goal: Verbalizes/displays adequate comfort level or baseline comfort level  Outcome: Progressing     Problem: Discharge Planning  Goal: Discharge to home or other facility with appropriate resources  Outcome: Progressing     Problem: ABCDS Injury Assessment  Goal: Absence of physical injury  Outcome: Progressing     
  Problem: Chronic Conditions and Co-morbidities  Goal: Patient's chronic conditions and co-morbidity symptoms are monitored and maintained or improved  Outcome: Progressing     Problem: Safety - Adult  Goal: Free from fall injury  Outcome: Progressing     Problem: Skin/Tissue Integrity  Goal: Absence of new skin breakdown  Description: 1.  Monitor for areas of redness and/or skin breakdown  2.  Assess vascular access sites hourly  3.  Every 4-6 hours minimum:  Change oxygen saturation probe site  4.  Every 4-6 hours:  If on nasal continuous positive airway pressure, respiratory therapy assess nares and determine need for appliance change or resting period.  Outcome: Progressing     Problem: Pain  Goal: Verbalizes/displays adequate comfort level or baseline comfort level  Outcome: Progressing     Problem: Discharge Planning  Goal: Discharge to home or other facility with appropriate resources  Outcome: Progressing     Problem: ABCDS Injury Assessment  Goal: Absence of physical injury  Outcome: Progressing     Problem: Physical Therapy - Adult  Goal: By Discharge: Performs mobility at highest level of function for planned discharge setting.  See evaluation for individualized goals.  Description: FUNCTIONAL STATUS PRIOR TO ADMISSION: Patient was modified independent using a rolling walker and rollator for functional mobility.  Also has Arthritic R shld which limits her use of arm with mobility.    HOME SUPPORT PRIOR TO ADMISSION: The patient lived with daughter but she is alone during the day.    Physical Therapy Goals  Initiated 10/17/2024  1.  Patient will move from supine to sit and sit to supine in bed with modified independence within 7 day(s).    2.  Patient will perform sit to stand with modified independence within 7 day(s).  3.  Patient will transfer from bed to chair and chair to bed with modified independence using the least restrictive device within 7 day(s).  4.  Patient will ambulate with modified 
  Problem: Chronic Conditions and Co-morbidities  Goal: Patient's chronic conditions and co-morbidity symptoms are monitored and maintained or improved  Outcome: Progressing  Flowsheets  Taken 10/16/2024 2015 by Elle Fuentes LPN  Care Plan - Patient's Chronic Conditions and Co-Morbidity Symptoms are Monitored and Maintained or Improved: Monitor and assess patient's chronic conditions and comorbid symptoms for stability, deterioration, or improvement  Taken 10/16/2024 1820 by Elvira Poe RN  Care Plan - Patient's Chronic Conditions and Co-Morbidity Symptoms are Monitored and Maintained or Improved: Monitor and assess patient's chronic conditions and comorbid symptoms for stability, deterioration, or improvement     Problem: Safety - Adult  Goal: Free from fall injury  Outcome: Progressing  Flowsheets (Taken 10/16/2024 1820 by Elvira Poe RN)  Free From Fall Injury: Instruct family/caregiver on patient safety     Problem: Skin/Tissue Integrity  Goal: Absence of new skin breakdown  Description: 1.  Monitor for areas of redness and/or skin breakdown  2.  Assess vascular access sites hourly  3.  Every 4-6 hours minimum:  Change oxygen saturation probe site  4.  Every 4-6 hours:  If on nasal continuous positive airway pressure, respiratory therapy assess nares and determine need for appliance change or resting period.  Outcome: Progressing     Problem: Pain  Goal: Verbalizes/displays adequate comfort level or baseline comfort level  Outcome: Progressing  Flowsheets  Taken 10/16/2024 1900 by Elvira Poe RN  Verbalizes/displays adequate comfort level or baseline comfort level: Encourage patient to monitor pain and request assistance  Taken 10/16/2024 1700 by Elvira Poe RN  Verbalizes/displays adequate comfort level or baseline comfort level: Encourage patient to monitor pain and request assistance     Problem: Discharge Planning  Goal: Discharge to home or other facility with appropriate resources  Outcome: 
  Problem: Occupational Therapy - Adult  Goal: By Discharge: Performs self-care activities at highest level of function for planned discharge setting.  See evaluation for individualized goals.  Description: FUNCTIONAL STATUS PRIOR TO ADMISSION:  Pt reports she has occasional assistance with BADLs but is alone during the day.     , ADL Assistance: Needs assistance, Bath: Modified independent, Dressing: Stand by assistance (Pt required intermittent assistance for fasteners and shoes.), Grooming: Modified independent ,  , Toileting: Independent,  , Ambulation Assistance: Independent, Transfer Assistance: Independent,        HOME SUPPORT: Patient lived with her daughter but is alone during day time hours.     Occupational Therapy Goals  Initiated 10/17/2024    1. Patient will perform lower body dressing with Modified [place] within 7 day(s).  2. Patient will perform upper body ADLS standing for 5 minutes without fatigue or LOB with Modified [place] within 7 days.  3. Patient will perform all aspects of toileting at Modified [place] within 7 days.  4. Patient will perform toilet transfers with Modified [place] using rolling walker within 7 days.  5. Patient will utilize energy conservation techniques during functional activities without cues within 7 day(s).        Outcome: Progressing     OCCUPATIONAL THERAPY TREATMENT  Patient: Lety Banerjee (84 y.o. female)  Date: 10/18/2024  Primary Diagnosis: Arthritis of right knee [M17.11]  Osteoarthritis of right knee, unspecified osteoarthritis type [M17.11]  Procedure(s) (LRB):  RIGHT TOTAL KNEE ARTHROPLASTY (VELYS) (Right) 2 Days Post-Op   Precautions: Fall Risk, Weight Bearing   Left Lower Extremity Weight Bearing: Weight Bearing As Tolerated            Chart, occupational therapy assessment, plan of care, and goals were reviewed.    ASSESSMENT  Patient continues to benefit from skilled OT services and is progressing towards goals. Pt cleared for 
  Problem: Physical Therapy - Adult  Goal: By Discharge: Performs mobility at highest level of function for planned discharge setting.  See evaluation for individualized goals.  Description: FUNCTIONAL STATUS PRIOR TO ADMISSION: Patient was modified independent using a rolling walker and rollator for functional mobility.  Also has Arthritic R shld which limits her use of arm with mobility.    HOME SUPPORT PRIOR TO ADMISSION: The patient lived with daughter but she is alone during the day.    Physical Therapy Goals  Initiated 10/17/2024  1.  Patient will move from supine to sit and sit to supine in bed with modified independence within 7 day(s).    2.  Patient will perform sit to stand with modified independence within 7 day(s).  3.  Patient will transfer from bed to chair and chair to bed with modified independence using the least restrictive device within 7 day(s).  4.  Patient will ambulate with modified independence for 150 feet with the least restrictive device within 7 day(s).   5.  Patient will ascend/descend 5 stairs with 1 handrail(s) with modified independence within 7 day(s).  10/17/2024 1511 by Berna Arce, PT  Outcome: Progressing  10/17/2024 1142 by Berna Arce, PT  Outcome: Progressing   PHYSICAL THERAPY TREATMENT    Patient: Lety Banerjee (84 y.o. female)  Date: 10/17/2024  Diagnosis: Arthritis of right knee [M17.11]  Osteoarthritis of right knee, unspecified osteoarthritis type [M17.11] Arthritis of right knee  Procedure(s) (LRB):  RIGHT TOTAL KNEE ARTHROPLASTY (VELYS) (Right) 1 Day Post-Op  Precautions: Fall Risk, Weight Bearing   Left Lower Extremity Weight Bearing: Weight Bearing As Tolerated                  ASSESSMENT:  Patient continues to benefit from skilled PT services and is progressing towards goals. Patient overall limited by pain, nausea, gait instability, generalized weakness and impaired balance.  This PM was received up in bathroom with RN.  Overall Korin for 
  Problem: Physical Therapy - Adult  Goal: By Discharge: Performs mobility at highest level of function for planned discharge setting.  See evaluation for individualized goals.  Description: FUNCTIONAL STATUS PRIOR TO ADMISSION: Patient was modified independent using a rolling walker and rollator for functional mobility.  Also has Arthritic R shld which limits her use of arm with mobility.    HOME SUPPORT PRIOR TO ADMISSION: The patient lived with daughter but she is alone during the day.    Physical Therapy Goals  Initiated 10/17/2024  1.  Patient will move from supine to sit and sit to supine in bed with modified independence within 7 day(s).    2.  Patient will perform sit to stand with modified independence within 7 day(s).  3.  Patient will transfer from bed to chair and chair to bed with modified independence using the least restrictive device within 7 day(s).  4.  Patient will ambulate with modified independence for 150 feet with the least restrictive device within 7 day(s).   5.  Patient will ascend/descend 5 stairs with 1 handrail(s) with modified independence within 7 day(s).  10/20/2024 1508 by Ángel Nicole, PT  Outcome: Progressing  10/20/2024 1336 by Ángel Nicole, PT  Outcome: Progressing       PHYSICAL THERAPY TREATMENT-AFTERNOON SESSION    Patient: Lety Banerjee (84 y.o. female)  Date: 10/20/2024  Diagnosis: Arthritis of right knee [M17.11]  Osteoarthritis of right knee, unspecified osteoarthritis type [M17.11] Arthritis of right knee  Procedure(s) (LRB):  RIGHT TOTAL KNEE ARTHROPLASTY (VELYS) (Right) 4 Days Post-Op  Precautions: Fall Risk, Weight Bearing   Left Lower Extremity Weight Bearing: Weight Bearing As Tolerated                ASSESSMENT:  Patient continues to benefit from skilled PT services and is progressing towards goals. Patient is now ambulating 150 ft with supervision/set-up using rolling walker. Patient performed arom of right knee edge of bed after 
  Problem: Physical Therapy - Adult  Goal: By Discharge: Performs mobility at highest level of function for planned discharge setting.  See evaluation for individualized goals.  Description: FUNCTIONAL STATUS PRIOR TO ADMISSION: Patient was modified independent using a rolling walker and rollator for functional mobility.  Also has Arthritic R shld which limits her use of arm with mobility.    HOME SUPPORT PRIOR TO ADMISSION: The patient lived with daughter but she is alone during the day.    Physical Therapy Goals  Initiated 10/17/2024  1.  Patient will move from supine to sit and sit to supine in bed with modified independence within 7 day(s).    2.  Patient will perform sit to stand with modified independence within 7 day(s).  3.  Patient will transfer from bed to chair and chair to bed with modified independence using the least restrictive device within 7 day(s).  4.  Patient will ambulate with modified independence for 150 feet with the least restrictive device within 7 day(s).   5.  Patient will ascend/descend 5 stairs with 1 handrail(s) with modified independence within 7 day(s).  Outcome: Progressing     PHYSICAL THERAPY TREATMENT    Patient: Lety Banerjee (84 y.o. female)  Date: 10/21/2024  Diagnosis: Arthritis of right knee [M17.11]  Osteoarthritis of right knee, unspecified osteoarthritis type [M17.11] Arthritis of right knee  Procedure(s) (LRB):  RIGHT TOTAL KNEE ARTHROPLASTY (VELYS) (Right) 5 Days Post-Op  Precautions: Fall Risk, Weight Bearing   Left Lower Extremity Weight Bearing: Weight Bearing As Tolerated                  ASSESSMENT:  Patient continues to benefit from skilled PT services and is progressing towards goals. Patient's pain is better tolerated today, and she had good endurance for activity. Patient educated on a step-through gait pattern instead of step-to. Patient reminded of proper technique for stair training - able to demonstrate safely with supervision . Able to properly 
  Problem: Physical Therapy - Adult  Goal: By Discharge: Performs mobility at highest level of function for planned discharge setting.  See evaluation for individualized goals.  Description: FUNCTIONAL STATUS PRIOR TO ADMISSION: Patient was modified independent using a rolling walker and rollator for functional mobility.  Also has Arthritic R shld which limits her use of arm with mobility.    HOME SUPPORT PRIOR TO ADMISSION: The patient lived with daughter but she is alone during the day.    Physical Therapy Goals  Initiated 10/17/2024  1.  Patient will move from supine to sit and sit to supine in bed with modified independence within 7 day(s).    2.  Patient will perform sit to stand with modified independence within 7 day(s).  3.  Patient will transfer from bed to chair and chair to bed with modified independence using the least restrictive device within 7 day(s).  4.  Patient will ambulate with modified independence for 150 feet with the least restrictive device within 7 day(s).   5.  Patient will ascend/descend 5 stairs with 1 handrail(s) with modified independence within 7 day(s).  Outcome: Progressing     PHYSICAL THERAPY TREATMENT-MORNING SESSION    Patient: Lety Banerjee (84 y.o. female)  Date: 10/20/2024  Diagnosis: Arthritis of right knee [M17.11]  Osteoarthritis of right knee, unspecified osteoarthritis type [M17.11] Arthritis of right knee  Procedure(s) (LRB):  RIGHT TOTAL KNEE ARTHROPLASTY (VELYS) (Right) 4 Days Post-Op  Precautions: Fall Risk, Weight Bearing   Left Lower Extremity Weight Bearing: Weight Bearing As Tolerated                ASSESSMENT:  Patient was seen for morning PT session. She is walking 150 feet at stand by assist level of assistance and with a rolling walker. Patient performed stair climbing with min assist with step to pattern and verbal cuing for sequencing. Patient demonstrated improved tolerance for gait - still with some increased upper ext weight bearing with 
Problem: Occupational Therapy - Adult  Goal: By Discharge: Performs self-care activities at highest level of function for planned discharge setting.  See evaluation for individualized goals.  Description: FUNCTIONAL STATUS PRIOR TO ADMISSION:  Pt reports she has occasional assistance with BADLs but is alone during the day.     , ADL Assistance: Needs assistance, Bath: Modified independent, Dressing: Stand by assistance (Pt required intermittent assistance for fasteners and shoes.), Grooming: Modified independent ,  , Toileting: Independent,  , Ambulation Assistance: Independent, Transfer Assistance: Independent,        HOME SUPPORT: Patient lived with her daughter but is alone during day time hours.     Occupational Therapy Goals  Initiated 10/17/2024    1. Patient will perform lower body dressing with Modified [place] within 7 day(s).  2. Patient will perform upper body ADLS standing for 5 minutes without fatigue or LOB with Modified [place] within 7 days.  3. Patient will perform all aspects of toileting at Modified [place] within 7 days.  4. Patient will perform toilet transfers with Modified [place] using rolling walker within 7 days.  5. Patient will utilize energy conservation techniques during functional activities without cues within 7 day(s).    Outcome: Progressing   OCCUPATIONAL THERAPY EVALUATION    Patient: Lety Banerjee (84 y.o. female)  Date: 10/17/2024  Primary Diagnosis: Arthritis of right knee [M17.11]  Osteoarthritis of right knee, unspecified osteoarthritis type [M17.11]  Procedure(s) (LRB):  RIGHT TOTAL KNEE ARTHROPLASTY (VELYS) (Right) 1 Day Post-Op     Precautions: Fall Risk, Weight Bearing   Left Lower Extremity Weight Bearing: Weight Bearing As Tolerated              ASSESSMENT :  Pt is POD #1 R TKA. The patient is limited by decreased functional mobility, independence in ADLs, ROM, strength, activity tolerance, balance.    Based on the impairments listed above 
Problem: Occupational Therapy - Adult  Goal: By Discharge: Performs self-care activities at highest level of function for planned discharge setting.  See evaluation for individualized goals.  Description: FUNCTIONAL STATUS PRIOR TO ADMISSION:  Pt reports she has occasional assistance with BADLs but is alone during the day.     , ADL Assistance: Needs assistance, Bath: Modified independent, Dressing: Stand by assistance (Pt required intermittent assistance for fasteners and shoes.), Grooming: Modified independent ,  , Toileting: Independent,  , Ambulation Assistance: Independent, Transfer Assistance: Independent,        HOME SUPPORT: Patient lived with her daughter but is alone during day time hours.   Occupational Therapy Goals  Initiated 10/17/2024    1. Patient will perform lower body dressing with Modified [place] within 7 day(s).  2. Patient will perform upper body ADLS standing for 5 minutes without fatigue or LOB with Modified [place] within 7 days.  3. Patient will perform all aspects of toileting at Modified [place] within 7 days.  4. Patient will perform toilet transfers with Modified [place] using rolling walker within 7 days.  5. Patient will utilize energy conservation techniques during functional activities without cues within 7 day(s).  Outcome: Progressing   OCCUPATIONAL THERAPY TREATMENT  Patient: Lety Banerjee (84 y.o. female)  Date: 10/21/2024  Primary Diagnosis: Arthritis of right knee [M17.11]  Osteoarthritis of right knee, unspecified osteoarthritis type [M17.11]  Procedure(s) (LRB):  RIGHT TOTAL KNEE ARTHROPLASTY (VELYS) (Right) 5 Days Post-Op   Precautions: Fall Risk, Weight Bearing   Left Lower Extremity Weight Bearing: Weight Bearing As Tolerated            Chart, occupational therapy assessment, plan of care, and goals were reviewed.    ASSESSMENT  Patient continues to benefit from skilled OT services and is progressing towards goals. Pt presented seated in 
continues to benefit from skilled intervention to address the above impairments.  Continue treatment per established plan of care.        Recommendation for discharge: (in order for the patient to meet his/her long term goals):   High intensity/comprehensive skilled physical therapy in a multidisciplinary setting as patient is working towards tolerating up to 3 hours of therapy/day 5-7x/week      IF patient discharges home will need the following DME: patient owns DME required for discharge       SUBJECTIVE:   Patient stated, \"I sometimes forget and let the walker get too far in front of me.\"    OBJECTIVE DATA SUMMARY:   Critical Behavior:  Orientation  Overall Orientation Status: Within Normal Limits  Orientation Level: Oriented X4  Cognition  Overall Cognitive Status: WNL    Functional Mobility Training:  Bed Mobility:  Bed Mobility Training  Bed Mobility Training: Yes  Overall Level of Assistance: Stand-by assistance;Additional time;Adaptive equipment (gait belt to move right leg out of bed)  Interventions: Verbal cues  Supine to Sit: Stand-by assistance;Additional time;Adaptive equipment  Sit to Supine:  (pt remained up in chair)  Transfers:  Transfer Training  Transfer Training: Yes  Overall Level of Assistance: Contact-guard assistance;Adaptive equipment;Additional time  Interventions: Safety awareness training;Verbal cues  Sit to Stand: Contact-guard assistance;Assist X1  Stand to Sit: Contact-guard assistance;Assist X1  Balance:  Balance  Sitting: Intact  Standing: Intact;With support  Standing - Static: Constant support;Good  Standing - Dynamic: Constant support;Good   Ambulation/Gait Training:     Gait  Gait Training: Yes  Right Side Weight Bearing: As tolerated  Overall Level of Assistance: Contact-guard assistance;Adaptive equipment;Additional time  Distance (ft): 75 Feet  Assistive Device: Gait belt;Walker, rolling  Interventions: Verbal cues;Demonstration  Base of Support: Center of gravity 
from skilled intervention to address the above impairments.  Continue treatment per established plan of care.        Recommendation for discharge: (in order for the patient to meet his/her long term goals):   High intensity/comprehensive skilled physical therapy in a multidisciplinary setting as patient is working towards tolerating up to 3 hours of therapy/day 5-7x/week        IF patient discharges home will need the following DME: patient owns DME required for discharge       SUBJECTIVE:   Patient stated, \"I can tolerate my leg lying flat.\"    OBJECTIVE DATA SUMMARY:   Critical Behavior:  Orientation  Overall Orientation Status: Within Normal Limits  Orientation Level: Oriented X4  Cognition  Overall Cognitive Status: WNL    Functional Mobility Training:  Bed Mobility:  Bed Mobility Training  Bed Mobility Training: Yes  Overall Level of Assistance: Stand-by assistance;Additional time;Adaptive equipment (use of gait belt to assist right leg in/out of bed)  Interventions: Verbal cues  Supine to Sit: Stand-by assistance;Additional time;Adaptive equipment  Sit to Supine: Stand-by assistance;Additional time;Adaptive equipment  Scooting: Stand-by assistance;Additional time  Transfers:  Transfer Training  Transfer Training: Yes  Overall Level of Assistance: Contact-guard assistance;Adaptive equipment;Additional time  Interventions: Safety awareness training;Verbal cues  Sit to Stand: Contact-guard assistance  Stand to Sit: Contact-guard assistance  Bed to Chair: Contact-guard assistance;Adaptive equipment;Additional time  Balance:  Balance  Sitting: Intact  Standing: Intact;With support  Standing - Static: Good;Constant support  Standing - Dynamic: Constant support;Good   Ambulation/Gait Training:     Gait  Gait Training: Yes  Right Side Weight Bearing: As tolerated  Overall Level of Assistance: Contact-guard assistance;Adaptive equipment;Additional time  Distance (ft): 75 Feet x 2   Assistive Device: Gait belt;Walker, 
goals):   High intensity/comprehensive skilled physical therapy in a multidisciplinary setting as patient is working towards tolerating up to 3 hours of therapy/day 5-7x/week       SUBJECTIVE:   Patient stated, \"I am ready to walk again.\"    OBJECTIVE DATA SUMMARY:     Functional Mobility Training for afternoon session:  Bed Mobility:  Bed Mobility Training  Bed Mobility Training: Yes  Overall Level of Assistance: Contact-guard assistance;Minimum assistance;Additional time;Adaptive equipment;Assist X1  Interventions: Safety awareness training;Verbal cues;Visual cues  Rolling: Contact-guard assistance;Additional time;Adaptive equipment  Supine to Sit:  (presents up in chair)  Sit to Supine: Minimum assistance;Assist X1;Additional time;Adaptive equipment  Scooting: Contact-guard assistance;Additional time;Adaptive equipment  Transfers:  Transfer Training  Transfer Training: Yes  Overall Level of Assistance: Contact-guard assistance  Interventions: Safety awareness training;Verbal cues  Sit to Stand: Contact-guard assistance;Assist X1  Stand to Sit: Contact-guard assistance;Assist X1  Bed to Chair: Contact-guard assistance;Assist X2;Adaptive equipment;Additional time  Balance:  Balance  Sitting: Intact  Standing: Intact;With support  Standing - Static: Constant support;Good  Standing - Dynamic: Good;Constant support   Ambulation/Gait Training:     Gait  Gait Training: Yes  Right Side Weight Bearing: As tolerated  Overall Level of Assistance: Contact-guard assistance  Distance (ft): 75 Feet  Assistive Device: Gait belt;Walker, rolling  Interventions: Tactile cues;Verbal cues  Base of Support: Narrowed;Center of gravity altered  Speed/Jaimie: Slow;Pace decreased (< 100 feet/min)  Step Length: Right shortened;Left shortened  Swing Pattern: Right asymmetrical  Stance: Right decreased  Gait Abnormalities: Antalgic;Decreased step clearance;Step to gait          Pain Ratin/10   Pain Intervention(s):   patient medicated 
ultimately modA to come to sit upright and to scoot to the EOB.  Sit to stand with Korin and amb approx 15 feet with antalgic gait pattern and step-to pattern.      Based on the impairments listed above patient is below her functional baseline.  Due to severity of deficits, inability to safely ambulate household distances or negotiate stairs safely, and patients notable decline from her functional baseline, feel patient will benefit from ongoing skilled PT in a high intensity facility to maximize functioning and independence prior to return home.     Patient will benefit from skilled intervention to address the above impairments.         PLAN :  Recommendations and Planned Interventions:   bed mobility training, transfer training, gait training, therapeutic exercises, neuromuscular re-education, patient and family training/education, and therapeutic activities    Frequency/Duration: Patient will be followed by physical therapy to address goals, PT Plan of Care: BID (needs eval) to address goals.        Recommendation for discharge: (in order for the patient to meet his/her long term goals):   High intensity/comprehensive skilled physical therapy in a multidisciplinary setting as patient is working towards tolerating up to 3 hours of therapy/day 5-7x/week    Other factors to consider for discharge: available support system works or is unable to provide adequate supervision and the patient would be alone, high risk for falls, not safe to be alone, and concern for safely navigating or managing the home environment    IF patient discharges home will need the following DME: continuing to assess with progress                SUBJECTIVE:   Patient stated “I am going to try my best.  But I need someone to push me.”    OBJECTIVE DATA SUMMARY:       Past Medical History:   Diagnosis Date    Arthritis     Asthma     Chronic kidney disease     RIGHT KIDNEY STONE    Chronic left shoulder pain 2023    Chronic pain     Diabetes 
support;Good  Standing - Dynamic: Constant support;Fair   Ambulation/Gait Training:     Gait  Gait Training: Yes  Right Side Weight Bearing: As tolerated  Overall Level of Assistance: Contact-guard assistance  Distance (ft): 45 Feet  Interventions: Tactile cues;Verbal cues  Base of Support: Narrowed;Center of gravity altered  Speed/Jaimie: Slow;Pace decreased (< 100 feet/min)  Step Length: Right shortened;Left shortened  Swing Pattern: Right asymmetrical  Stance: Right decreased  Gait Abnormalities: Antalgic;Decreased step clearance;Step to gait    Pain Ratin/10   Pain Intervention(s):   patient medicated for pain prior to session and ice    Activity Tolerance:   Good    After treatment:   Patient left in no apparent distress sitting up in chair, Call bell within reach, Bed/ chair alarm activated, Caregiver / family present, and nurse notified.      COMMUNICATION/EDUCATION:   The patient's plan of care was discussed with: occupational therapist, registered nurse, physician, and     Patient Education  Education Given To: Patient;Family  Education Provided: Role of Therapy;Plan of Care;Precautions;Equipment;Transfer Training;Fall Prevention Strategies;Energy Conservation  Education Provided Comments: Patient instructed NOT to get up unassisted.  Education Method: Demonstration;Verbal  Barriers to Learning: None  Education Outcome: Verbalized understanding;Demonstrated understanding      Alisson Zuñiga, PT  Minutes: 30

## 2024-10-22 ENCOUNTER — TELEPHONE (OUTPATIENT)
Age: 84
End: 2024-10-22

## 2024-10-22 NOTE — TELEPHONE ENCOUNTER
Care Transitions Initial Follow Up Call    Outreach made within 2 business days of discharge: Yes    Patient: Lety Banerjee Patient : 1940   MRN: [id number]  Reason for Admission: S/P Right TKR  Discharge Date: 10/21/24       Spoke with: patient    Discharge department/facility: [facility] Patient Contact:  Was patient able to fill all prescriptions: Yes  Was patient instructed to bring all medications to the follow-up visit: Yes  Is patient taking all medications as directed in the discharge summary? Yes  Does patient understand their discharge instructions: Yes  Does patient have questions or concerns that need addressed prior to 7-14 day follow up office visit: no    Additional needs identified to be addressed with provider  No needs identified             Scheduled appointment with Surgeon within 7-14 days    Follow Up  Future Appointments   Date Time Provider Department Center   2024  3:00 PM Michael Harp MD [facility]   2025 11:30 AM Ez Connelly MD [facility]       Yvette Lew

## 2024-10-28 ENCOUNTER — TELEPHONE (OUTPATIENT)
Age: 84
End: 2024-10-28

## 2024-10-28 NOTE — TELEPHONE ENCOUNTER
[facility] - checking on faxed request for order       Re:incontinence supplies       Best number to reach them [phone]  Fax [phone]

## 2024-12-19 DIAGNOSIS — I10 ESSENTIAL (PRIMARY) HYPERTENSION: ICD-10-CM

## 2024-12-19 DIAGNOSIS — I10 PRIMARY HYPERTENSION: ICD-10-CM

## 2024-12-19 RX ORDER — HYDROCHLOROTHIAZIDE 12.5 MG/1
12.5 TABLET ORAL DAILY
Qty: 30 TABLET | Refills: 0 | Status: SHIPPED | OUTPATIENT
Start: 2024-12-19

## 2024-12-19 NOTE — TELEPHONE ENCOUNTER
Patient is requesting a refill on the following medications    Hydrochlorothiazide 12.5 mg    Patient is completely out    Patient # [phone]    Nayana Zarate

## 2025-01-06 SDOH — ECONOMIC STABILITY: TRANSPORTATION INSECURITY
IN THE PAST 12 MONTHS, HAS LACK OF TRANSPORTATION KEPT YOU FROM MEETINGS, WORK, OR FROM GETTING THINGS NEEDED FOR DAILY LIVING?: PATIENT DECLINED

## 2025-01-06 SDOH — ECONOMIC STABILITY: INCOME INSECURITY: HOW HARD IS IT FOR YOU TO PAY FOR THE VERY BASICS LIKE FOOD, HOUSING, MEDICAL CARE, AND HEATING?: PATIENT DECLINED

## 2025-01-06 SDOH — ECONOMIC STABILITY: FOOD INSECURITY: WITHIN THE PAST 12 MONTHS, THE FOOD YOU BOUGHT JUST DIDN'T LAST AND YOU DIDN'T HAVE MONEY TO GET MORE.: PATIENT DECLINED

## 2025-01-06 SDOH — ECONOMIC STABILITY: FOOD INSECURITY: WITHIN THE PAST 12 MONTHS, YOU WORRIED THAT YOUR FOOD WOULD RUN OUT BEFORE YOU GOT MONEY TO BUY MORE.: PATIENT DECLINED

## 2025-01-09 ENCOUNTER — TELEMEDICINE (OUTPATIENT)
Age: 85
End: 2025-01-09
Payer: COMMERCIAL

## 2025-01-09 DIAGNOSIS — R19.7 DIARRHEA, UNSPECIFIED TYPE: Primary | ICD-10-CM

## 2025-01-09 PROBLEM — M17.0 PRIMARY OSTEOARTHRITIS OF BOTH KNEES: Status: ACTIVE | Noted: 2025-01-09

## 2025-01-09 PROBLEM — N28.89 RENAL MASS: Status: ACTIVE | Noted: 2020-04-28

## 2025-01-09 PROBLEM — D17.71 ANGIOMYOLIPOMA OF LEFT KIDNEY: Status: ACTIVE | Noted: 2024-09-30

## 2025-01-09 PROBLEM — R94.4 DECREASED GFR: Status: ACTIVE | Noted: 2025-01-09

## 2025-01-09 PROBLEM — E66.01 SEVERE OBESITY: Status: RESOLVED | Noted: 2020-11-12 | Resolved: 2025-01-09

## 2025-01-09 PROBLEM — D35.00 ADRENAL ADENOMA: Status: ACTIVE | Noted: 2020-08-26

## 2025-01-09 PROBLEM — E27.8 ADRENAL MASS (HCC): Status: ACTIVE | Noted: 2021-11-16

## 2025-01-09 PROBLEM — R80.9 MICROALBUMINURIA: Status: ACTIVE | Noted: 2025-01-09

## 2025-01-09 PROBLEM — N20.0 KIDNEY STONE: Status: ACTIVE | Noted: 2024-09-30

## 2025-01-09 PROCEDURE — 99213 OFFICE O/P EST LOW 20 MIN: CPT | Performed by: INTERNAL MEDICINE

## 2025-01-09 PROCEDURE — 1123F ACP DISCUSS/DSCN MKR DOCD: CPT | Performed by: INTERNAL MEDICINE

## 2025-01-09 RX ORDER — LOPERAMIDE HYDROCHLORIDE 2 MG/1
CAPSULE ORAL
Qty: 20 CAPSULE | Refills: 1 | Status: SHIPPED | OUTPATIENT
Start: 2025-01-09

## 2025-01-09 SDOH — ECONOMIC STABILITY: FOOD INSECURITY: WITHIN THE PAST 12 MONTHS, THE FOOD YOU BOUGHT JUST DIDN'T LAST AND YOU DIDN'T HAVE MONEY TO GET MORE.: NEVER TRUE

## 2025-01-09 SDOH — ECONOMIC STABILITY: FOOD INSECURITY: WITHIN THE PAST 12 MONTHS, YOU WORRIED THAT YOUR FOOD WOULD RUN OUT BEFORE YOU GOT MONEY TO BUY MORE.: NEVER TRUE

## 2025-01-09 ASSESSMENT — ANXIETY QUESTIONNAIRES
IF YOU CHECKED OFF ANY PROBLEMS ON THIS QUESTIONNAIRE, HOW DIFFICULT HAVE THESE PROBLEMS MADE IT FOR YOU TO DO YOUR WORK, TAKE CARE OF THINGS AT HOME, OR GET ALONG WITH OTHER PEOPLE: NOT DIFFICULT AT ALL
GAD7 TOTAL SCORE: 0
6. BECOMING EASILY ANNOYED OR IRRITABLE: NOT AT ALL
5. BEING SO RESTLESS THAT IT IS HARD TO SIT STILL: NOT AT ALL
2. NOT BEING ABLE TO STOP OR CONTROL WORRYING: NOT AT ALL
4. TROUBLE RELAXING: NOT AT ALL
1. FEELING NERVOUS, ANXIOUS, OR ON EDGE: NOT AT ALL
3. WORRYING TOO MUCH ABOUT DIFFERENT THINGS: NOT AT ALL
7. FEELING AFRAID AS IF SOMETHING AWFUL MIGHT HAPPEN: NOT AT ALL

## 2025-01-09 ASSESSMENT — PATIENT HEALTH QUESTIONNAIRE - PHQ9
SUM OF ALL RESPONSES TO PHQ QUESTIONS 1-9: 0
SUM OF ALL RESPONSES TO PHQ QUESTIONS 1-9: 0
SUM OF ALL RESPONSES TO PHQ9 QUESTIONS 1 & 2: 0
SUM OF ALL RESPONSES TO PHQ QUESTIONS 1-9: 0
2. FEELING DOWN, DEPRESSED OR HOPELESS: NOT AT ALL
SUM OF ALL RESPONSES TO PHQ QUESTIONS 1-9: 0
1. LITTLE INTEREST OR PLEASURE IN DOING THINGS: NOT AT ALL

## 2025-01-09 NOTE — PROGRESS NOTES
Lety Banerjee, was evaluated through a synchronous (real-time) audio-video encounter. The patient (or guardian if applicable) is aware that this is a billable service, which includes applicable co-pays. This Virtual Visit was conducted with patient's (and/or legal guardian's) consent. Patient identification was verified, and a caregiver was present when appropriate.   The patient was located at Home: [address] [place] [id number]  Provider was located at Facility (Appt Dept): [address] [place] [id number]  Confirm you are appropriately licensed, registered, or certified to deliver care in the state where the patient is located as indicated above. If you are not or unsure, please re-schedule the visit: Yes, I confirm.     Lety Banerjee (:  1940) is a Established patient, presenting virtually for evaluation of the following:      Below is the assessment and plan developed based on review of pertinent history, physical exam, labs, studies, and medications.     Assessment & Plan  Diarrhea, unspecified type   Orders:    loperamide (IMODIUM) 2 MG capsule; Take 4 mg at onset of diarrhea x 1, then 2 mg po x 1 aftereach loose stool      Return in about 3 months (around 2025) for routine follow up.       Subjective   Lety Banerjee is a 84 y.o. AA female is seen for recurrent diarrhea.  Denies abdominal pain/cramps, nausea/vomiting, fever/chills, blood in stools.  Symptoms started over 2 months ago. I have reviewed current medications    Review of Systems   As per hpi    Objective   Patient-Reported Vitals  No data recorded     Physical Exam  [INSTRUCTIONS:  \"[x]\" Indicates a positive item  \"[]\" Indicates a negative item  -- DELETE ALL ITEMS NOT EXAMINED]    Constitutional: [x] Appears well-developed and well-nourished [x] No apparent distress      [] Abnormal -     Mental status: [x] Alert and awake  [x] Oriented to person/place/time [x] Able to

## 2025-01-16 DIAGNOSIS — I10 ESSENTIAL (PRIMARY) HYPERTENSION: ICD-10-CM

## 2025-01-16 DIAGNOSIS — I10 PRIMARY HYPERTENSION: ICD-10-CM

## 2025-01-16 NOTE — TELEPHONE ENCOUNTER
Last appointment: 1/9/25  Next appointment: Advised to follow-up 4/9/25  Previous refill encounter(s): 12/19/24 [facility] #30, 2/16/24 [facility]     Requested Prescriptions     Pending Prescriptions Disp Refills    Multiple Vitamins-Minerals (CENTRUM SILVER ULTRA WOMENS) TABS [Pharmacy Med Name: CENTRUM SILVER WOMEN TABLET] 90 tablet 1     Sig: TAKE ONE TABLET BY MOUTH EVERY MORNING    hydroCHLOROthiazide 12.5 MG tablet 90 tablet 1     Sig: Take 1 tablet by mouth daily         For Pharmacy Admin Tracking Only    Program: Medication Refill  CPA in place:    Recommendation Provided To:   Intervention Detail: New Rx: 2, reason: Patient Preference  Intervention Accepted By:   Gap Closed?:    Time Spent (min): 5

## 2025-01-17 RX ORDER — HYDROCHLOROTHIAZIDE 12.5 MG/1
12.5 TABLET ORAL DAILY
Qty: 90 TABLET | Refills: 1 | Status: SHIPPED | OUTPATIENT
Start: 2025-01-17

## 2025-01-17 RX ORDER — MULTIVIT-MIN/IRON/FA/VIT K/LUT 8MG-400MCG
1 TABLET ORAL EVERY MORNING
Qty: 90 TABLET | Refills: 1 | Status: SHIPPED | OUTPATIENT
Start: 2025-01-17

## 2025-01-21 ENCOUNTER — TELEPHONE (OUTPATIENT)
Age: 85
End: 2025-01-21

## 2025-01-21 NOTE — TELEPHONE ENCOUNTER
Pt dtr lis Hardy     States they would like an order for pt shoulder   She is already getting PT for knee     [facility]     (States she has had the shoulder order before)     Best number to reach her is [phone]

## 2025-01-22 DIAGNOSIS — M25.512 CHRONIC PAIN OF BOTH SHOULDERS: ICD-10-CM

## 2025-01-22 DIAGNOSIS — M25.511 CHRONIC PAIN OF BOTH SHOULDERS: ICD-10-CM

## 2025-01-22 DIAGNOSIS — G89.29 CHRONIC LEFT SHOULDER PAIN: Primary | ICD-10-CM

## 2025-01-22 DIAGNOSIS — M25.512 CHRONIC LEFT SHOULDER PAIN: Primary | ICD-10-CM

## 2025-01-22 DIAGNOSIS — G89.29 CHRONIC PAIN OF BOTH SHOULDERS: ICD-10-CM

## 2025-01-29 ENCOUNTER — TELEPHONE (OUTPATIENT)
Age: 85
End: 2025-01-29

## 2025-01-29 NOTE — TELEPHONE ENCOUNTER
Pls call Hayley Ray dtr     She wants a letter for dentist   Re: deep cleaning & crown         Best number to reach her is [phone]

## 2025-02-05 ENCOUNTER — OFFICE VISIT (OUTPATIENT)
Age: 85
End: 2025-02-05
Payer: COMMERCIAL

## 2025-02-05 VITALS
HEART RATE: 84 BPM | WEIGHT: 158 LBS | DIASTOLIC BLOOD PRESSURE: 62 MMHG | HEIGHT: 59 IN | SYSTOLIC BLOOD PRESSURE: 141 MMHG | RESPIRATION RATE: 18 BRPM | TEMPERATURE: 97.5 F | OXYGEN SATURATION: 100 % | BODY MASS INDEX: 31.85 KG/M2

## 2025-02-05 DIAGNOSIS — E03.9 ACQUIRED HYPOTHYROIDISM: ICD-10-CM

## 2025-02-05 DIAGNOSIS — I10 ESSENTIAL (PRIMARY) HYPERTENSION: ICD-10-CM

## 2025-02-05 DIAGNOSIS — E11.21 TYPE 2 DIABETES MELLITUS WITH NEPHROPATHY (HCC): ICD-10-CM

## 2025-02-05 DIAGNOSIS — Z01.818 PRE-OP EVALUATION: ICD-10-CM

## 2025-02-05 DIAGNOSIS — Z79.2 NEED FOR ANTIBIOTIC PROPHYLAXIS FOR DENTAL PROCEDURE: ICD-10-CM

## 2025-02-05 DIAGNOSIS — Z01.818 PRE-OP EVALUATION: Primary | ICD-10-CM

## 2025-02-05 LAB
ALBUMIN SERPL-MCNC: 4 G/DL (ref 3.5–5)
ALBUMIN/GLOB SERPL: 1.2 (ref 1.1–2.2)
ALP SERPL-CCNC: 133 U/L (ref 45–117)
ALT SERPL-CCNC: 65 U/L (ref 12–78)
ANION GAP SERPL CALC-SCNC: 10 MMOL/L (ref 2–12)
AST SERPL-CCNC: 50 U/L (ref 15–37)
BILIRUB SERPL-MCNC: 0.9 MG/DL (ref 0.2–1)
BUN SERPL-MCNC: 26 MG/DL (ref 6–20)
BUN/CREAT SERPL: 25 (ref 12–20)
CALCIUM SERPL-MCNC: 10.1 MG/DL (ref 8.5–10.1)
CHLORIDE SERPL-SCNC: 103 MMOL/L (ref 97–108)
CO2 SERPL-SCNC: 27 MMOL/L (ref 21–32)
CREAT SERPL-MCNC: 1.05 MG/DL (ref 0.55–1.02)
GLOBULIN SER CALC-MCNC: 3.4 G/DL (ref 2–4)
GLUCOSE SERPL-MCNC: 98 MG/DL (ref 65–100)
POTASSIUM SERPL-SCNC: 4.1 MMOL/L (ref 3.5–5.1)
PROT SERPL-MCNC: 7.4 G/DL (ref 6.4–8.2)
SODIUM SERPL-SCNC: 140 MMOL/L (ref 136–145)
T4 FREE SERPL-MCNC: 1.6 NG/DL (ref 0.8–1.5)
TSH SERPL DL<=0.05 MIU/L-ACNC: 1.04 UIU/ML (ref 0.36–3.74)

## 2025-02-05 PROCEDURE — 99214 OFFICE O/P EST MOD 30 MIN: CPT | Performed by: INTERNAL MEDICINE

## 2025-02-05 PROCEDURE — 3077F SYST BP >= 140 MM HG: CPT | Performed by: INTERNAL MEDICINE

## 2025-02-05 PROCEDURE — 1123F ACP DISCUSS/DSCN MKR DOCD: CPT | Performed by: INTERNAL MEDICINE

## 2025-02-05 PROCEDURE — 3078F DIAST BP <80 MM HG: CPT | Performed by: INTERNAL MEDICINE

## 2025-02-05 RX ORDER — AMOXICILLIN 500 MG/1
CAPSULE ORAL
Qty: 4 CAPSULE | Refills: 0 | Status: SHIPPED | OUTPATIENT
Start: 2025-02-05

## 2025-02-05 NOTE — PROGRESS NOTES
Chief Complaint   Patient presents with    Pre-op Exam     Clearance for dental work. Fax to Dr. CLAUDIO James @ [phone].     HPI:  Lety Banerjee is a 84 y.o. AA female with diabetes type 2, hypertension, hypercholesterolemia, hypothyroidism, recent right knee replacement surgery presents accompanied by daughter for pre-op clearance. Patient has a deep dental cleaning work scheduled with Dr. CLAUDIO James on 2025.    Blood pressure reading is stable on current medications. Patient has c/o left shoulder pain; she follows orthopedics.  Patient's current medications have been reviewed. She is stable for scheduled procedure.  However, for precaution advise a prophylactic antibiotic proirtoprocedure     Review of Systems  As per hpi    Past Medical History:   Diagnosis Date    Arthritis     Asthma     Chronic kidney disease     RIGHT KIDNEY STONE    Chronic left shoulder pain     Chronic pain     Diabetes (HCC)     History of blood transfusion     History of pancreatitis 2018    Hypertension     Hypothyroidism     Mass of kidney     BEING WATCHED BY UROLOGY    Mixed hyperlipidemia 10/28/2011    Thyroid disease     s/p partial thyroidectomy     Past Surgical History:   Procedure Laterality Date    EGD INTRMURAL NEEDLE ASPIR/BIOP ALTERED ANATOMY  10/31/2011    PT DENIES    EYE SURGERY      L CATARACT REMOVED    GYN       x 2    JOINT REPLACEMENT      L HIP    OTHER SURGICAL HISTORY      thyroidectomy - partial    TOTAL HIP ARTHROPLASTY Right 2023    RIGHT HIP TOTAL ARTHROPLASTY ANTERIOR APPROACH NAVIGATION (VELYS) performed by Michael Harp MD at [facility] MAIN OR    TOTAL KNEE ARTHROPLASTY Right 10/16/2024    RIGHT TOTAL KNEE ARTHROPLASTY (VELYS) performed by Michael Harp MD at [facility] MAIN OR     Social History     Socioeconomic History    Marital status:      Spouse name: None    Number of children: None    Years of education: None    Highest education level: None

## 2025-02-05 NOTE — PROGRESS NOTES
Chief Complaint   Patient presents with    Pre-op Exam     Clearance for dental work. Fax to Dr. CLAUDIO James @ [phone].       \"Have you been to the ER, urgent care clinic since your last visit?  Hospitalized since your last visit?\"    NO    “Have you seen or consulted any other health care providers outside of [facility] since your last visit?”    NO            Click Here for Release of Records Request       There were no vitals filed for this visit.   Health Maintenance Due   Topic Date Due    DTaP/Tdap/Td vaccine (1 - Tdap) Never done    Shingles vaccine (1 of 2) Never done    Respiratory Syncytial Virus (RSV) Pregnant or age 60 yrs+ (1 - 1-dose 75+ series) Never done    Pneumococcal 65+ years Vaccine (2 of 2 - PCV) 2018    Diabetic retinal exam  2019    Diabetic foot exam  2024    Flu vaccine (1) 2024    COVID-19 Vaccine ( season) 2024        The patient, Lety Banerjee, identity was verified by name and .       Verbal order with read back for Ha1c re-ordered due too Lab Cancelling order d/t unable to get specimen from Dr. Connelly.

## 2025-02-07 ENCOUNTER — TELEPHONE (OUTPATIENT)
Age: 85
End: 2025-02-07

## 2025-02-07 DIAGNOSIS — Z01.818 PRE-OP EVALUATION: ICD-10-CM

## 2025-02-07 DIAGNOSIS — I10 ESSENTIAL (PRIMARY) HYPERTENSION: Primary | ICD-10-CM

## 2025-02-07 NOTE — TELEPHONE ENCOUNTER
Verbal order with read back for CBC d/t specimen being clotted. Patient aware orders sent to re-draw labs.

## 2025-02-07 NOTE — TELEPHONE ENCOUNTER
----- Message from Diann HAMMER sent at 2025 12:37 PM EST -----  Regarding: Lab Specimen Problem  Hello,    Please see information below for details regarding a problem with samples received at [facility]:    Patient Name: Lety Santos  Patient : 1940  Test(s) affected: CBC w/Diff  Description of Problem: Clotted specimen    Please place a new order and Client Services will contact the patient for recollection.     If you have any questions, please call [phone] and a representative will assist you.    Thank you,    aRmírez Hidalgo [facility] Client Services

## 2025-02-07 NOTE — TELEPHONE ENCOUNTER
Pt calling about repeat lab  She states the lab just called her and told her some needed to be redone       [facility] advise [phone]

## 2025-02-10 DIAGNOSIS — I10 ESSENTIAL (PRIMARY) HYPERTENSION: ICD-10-CM

## 2025-02-10 DIAGNOSIS — E11.21 TYPE 2 DIABETES MELLITUS WITH NEPHROPATHY (HCC): ICD-10-CM

## 2025-02-10 DIAGNOSIS — Z01.818 PRE-OP EVALUATION: ICD-10-CM

## 2025-02-10 LAB
BASOPHILS # BLD: 0.05 K/UL (ref 0–0.1)
BASOPHILS NFR BLD: 0.6 % (ref 0–1)
DIFFERENTIAL METHOD BLD: ABNORMAL
EOSINOPHIL # BLD: 0.07 K/UL (ref 0–0.4)
EOSINOPHIL NFR BLD: 0.9 % (ref 0–7)
ERYTHROCYTE [DISTWIDTH] IN BLOOD BY AUTOMATED COUNT: 14.3 % (ref 11.5–14.5)
EST. AVERAGE GLUCOSE BLD GHB EST-MCNC: 128 MG/DL
HBA1C MFR BLD: 6.1 % (ref 4–5.6)
HCT VFR BLD AUTO: 42.4 % (ref 35–47)
HGB BLD-MCNC: 14.4 G/DL (ref 11.5–16)
IMM GRANULOCYTES # BLD AUTO: 0.07 K/UL (ref 0–0.04)
IMM GRANULOCYTES NFR BLD AUTO: 0.9 % (ref 0–0.5)
LYMPHOCYTES # BLD: 2.88 K/UL (ref 0.8–3.5)
LYMPHOCYTES NFR BLD: 36.1 % (ref 12–49)
MCH RBC QN AUTO: 28.1 PG (ref 26–34)
MCHC RBC AUTO-ENTMCNC: 34 G/DL (ref 30–36.5)
MCV RBC AUTO: 82.8 FL (ref 80–99)
MONOCYTES # BLD: 0.67 K/UL (ref 0–1)
MONOCYTES NFR BLD: 8.4 % (ref 5–13)
NEUTS SEG # BLD: 4.24 K/UL (ref 1.8–8)
NEUTS SEG NFR BLD: 53.1 % (ref 32–75)
NRBC # BLD: 0 K/UL (ref 0–0.01)
NRBC BLD-RTO: 0 PER 100 WBC
PLATELET # BLD AUTO: 208 K/UL (ref 150–400)
PMV BLD AUTO: 10 FL (ref 8.9–12.9)
RBC # BLD AUTO: 5.12 M/UL (ref 3.8–5.2)
WBC # BLD AUTO: 8 K/UL (ref 3.6–11)

## 2025-02-26 RX ORDER — LANCETS 33 GAUGE
EACH MISCELLANEOUS
Qty: 100 EACH | Refills: 3 | Status: SHIPPED | OUTPATIENT
Start: 2025-02-26

## 2025-02-26 RX ORDER — BLOOD SUGAR DIAGNOSTIC
1 STRIP MISCELLANEOUS 2 TIMES DAILY
Qty: 100 EACH | Refills: 3 | Status: SHIPPED | OUTPATIENT
Start: 2025-02-26

## 2025-03-07 DIAGNOSIS — I10 ESSENTIAL (PRIMARY) HYPERTENSION: ICD-10-CM

## 2025-03-07 DIAGNOSIS — I10 PRIMARY HYPERTENSION: ICD-10-CM

## 2025-03-07 NOTE — TELEPHONE ENCOUNTER
Last appointment: 2/5/25  Next appointment: 5/8/25  Previous refill encounter(s): 5/24/24 #90 with 1 refill    Requested Prescriptions     Pending Prescriptions Disp Refills    amLODIPine (NORVASC) 10 MG tablet [Pharmacy Med Name: amLODIPine BESYLATE 10MG TAB] 90 tablet 1     Sig: TAKE 1 TABLET BY MOUTH DAILY         For Pharmacy Admin Tracking Only    Program: Medication Refill  CPA in place:    Recommendation Provided To:   Intervention Detail: New Rx: 1, reason: Patient Preference  Intervention Accepted By:   Gap Closed?:    Time Spent (min): 5

## 2025-03-08 RX ORDER — AMLODIPINE BESYLATE 10 MG/1
10 TABLET ORAL DAILY
Qty: 90 TABLET | Refills: 1 | Status: SHIPPED | OUTPATIENT
Start: 2025-03-08

## 2025-03-27 DIAGNOSIS — I10 ESSENTIAL (PRIMARY) HYPERTENSION: ICD-10-CM

## 2025-03-27 DIAGNOSIS — Z79.4 TYPE 2 DIABETES MELLITUS WITHOUT COMPLICATION, WITH LONG-TERM CURRENT USE OF INSULIN: ICD-10-CM

## 2025-03-27 DIAGNOSIS — E11.9 TYPE 2 DIABETES MELLITUS WITHOUT COMPLICATION, WITH LONG-TERM CURRENT USE OF INSULIN: ICD-10-CM

## 2025-03-27 RX ORDER — LEVOTHYROXINE SODIUM 75 UG/1
75 TABLET ORAL
Qty: 90 TABLET | Refills: 1 | Status: SHIPPED | OUTPATIENT
Start: 2025-03-27

## 2025-03-27 RX ORDER — GLIPIZIDE 5 MG/1
10 TABLET ORAL
Qty: 180 TABLET | Refills: 1 | Status: SHIPPED | OUTPATIENT
Start: 2025-03-27

## 2025-04-08 NOTE — TELEPHONE ENCOUNTER
Pt would like an inhaler for asthma   Hasn't asked for one since hasn't had the problem in about 4 years   Has not had symptoms but is having dental surgery and they told her she should probably bring an inhaler with her       [facility]             Best number to reach her is [phone]

## 2025-04-10 RX ORDER — ALBUTEROL SULFATE 90 UG/1
2 INHALANT RESPIRATORY (INHALATION) EVERY 6 HOURS PRN
Qty: 18 G | Refills: 3 | Status: SHIPPED | OUTPATIENT
Start: 2025-04-10

## 2025-04-27 DIAGNOSIS — I10 PRIMARY HYPERTENSION: ICD-10-CM

## 2025-04-27 DIAGNOSIS — I10 ESSENTIAL (PRIMARY) HYPERTENSION: ICD-10-CM

## 2025-04-28 DIAGNOSIS — E78.00 PURE HYPERCHOLESTEROLEMIA: ICD-10-CM

## 2025-04-28 RX ORDER — VALSARTAN 320 MG/1
320 TABLET ORAL DAILY
Qty: 90 TABLET | Refills: 1 | Status: SHIPPED | OUTPATIENT
Start: 2025-04-28

## 2025-04-28 RX ORDER — ATORVASTATIN CALCIUM 80 MG/1
80 TABLET, FILM COATED ORAL NIGHTLY
Qty: 90 TABLET | Refills: 1 | Status: SHIPPED | OUTPATIENT
Start: 2025-04-28

## 2025-05-08 ENCOUNTER — RESULTS FOLLOW-UP (OUTPATIENT)
Age: 85
End: 2025-05-08

## 2025-05-08 ENCOUNTER — OFFICE VISIT (OUTPATIENT)
Age: 85
End: 2025-05-08
Payer: COMMERCIAL

## 2025-05-08 VITALS
OXYGEN SATURATION: 100 % | HEIGHT: 59 IN | DIASTOLIC BLOOD PRESSURE: 54 MMHG | HEART RATE: 77 BPM | WEIGHT: 157.6 LBS | TEMPERATURE: 97.7 F | RESPIRATION RATE: 18 BRPM | BODY MASS INDEX: 31.77 KG/M2 | SYSTOLIC BLOOD PRESSURE: 148 MMHG

## 2025-05-08 DIAGNOSIS — E11.9 ENCOUNTER FOR DIABETIC FOOT EXAM (HCC): ICD-10-CM

## 2025-05-08 DIAGNOSIS — D36.7 DERMOID CYST OF LEFT UPPER EXTREMITY: Primary | ICD-10-CM

## 2025-05-08 DIAGNOSIS — I10 ESSENTIAL (PRIMARY) HYPERTENSION: ICD-10-CM

## 2025-05-08 DIAGNOSIS — E11.21 TYPE 2 DIABETES MELLITUS WITH NEPHROPATHY (HCC): ICD-10-CM

## 2025-05-08 PROCEDURE — 99214 OFFICE O/P EST MOD 30 MIN: CPT | Performed by: INTERNAL MEDICINE

## 2025-05-08 PROCEDURE — 3044F HG A1C LEVEL LT 7.0%: CPT | Performed by: INTERNAL MEDICINE

## 2025-05-08 PROCEDURE — 3077F SYST BP >= 140 MM HG: CPT | Performed by: INTERNAL MEDICINE

## 2025-05-08 PROCEDURE — 3078F DIAST BP <80 MM HG: CPT | Performed by: INTERNAL MEDICINE

## 2025-05-08 PROCEDURE — 1123F ACP DISCUSS/DSCN MKR DOCD: CPT | Performed by: INTERNAL MEDICINE

## 2025-05-08 RX ORDER — DOXYCYCLINE 100 MG/1
CAPSULE ORAL
COMMUNITY
Start: 2025-05-05 | End: 2025-05-14 | Stop reason: ALTCHOICE

## 2025-05-08 ASSESSMENT — PATIENT HEALTH QUESTIONNAIRE - PHQ9
SUM OF ALL RESPONSES TO PHQ QUESTIONS 1-9: 0
SUM OF ALL RESPONSES TO PHQ QUESTIONS 1-9: 0
2. FEELING DOWN, DEPRESSED OR HOPELESS: NOT AT ALL
1. LITTLE INTEREST OR PLEASURE IN DOING THINGS: NOT AT ALL
SUM OF ALL RESPONSES TO PHQ QUESTIONS 1-9: 0
SUM OF ALL RESPONSES TO PHQ QUESTIONS 1-9: 0

## 2025-05-08 NOTE — PROGRESS NOTES
Chief Complaint   Patient presents with    Follow-up    Cyst     Right shoulder, seen at [facility]       \"Have you been to the ER, urgent care clinic since your last visit?  Hospitalized since your last visit?\"    YES - When: approximately 4 days ago.  Where and Why: [facility].    “Have you seen or consulted any other health care providers outside of [facility] since your last visit?”    YES - When: approximately 1 months ago.  Where and Why: Urologist.            Click Here for Release of Records Request       There were no vitals filed for this visit.   Health Maintenance Due   Topic Date Due    DTaP/Tdap/Td vaccine (1 - Tdap) Never done    Shingles vaccine (1 of 2) Never done    Respiratory Syncytial Virus (RSV) Pregnant or age 60 yrs+ (1 - 1-dose 75+ series) Never done    Pneumococcal 50+ years Vaccine (2 of 2 - PCV) 2018    Diabetic retinal exam  2019    Diabetic foot exam  2024    COVID-19 Vaccine ( season) 2024    Lipids  2025        The patient, Lety Banerjee, identity was verified by name and .     
Take all 4 tabs 1 hour prior to procedure 4 capsule 0    Multiple Vitamins-Minerals (CENTRUM SILVER ULTRA WOMENS) TABS TAKE ONE TABLET BY MOUTH EVERY MORNING 90 tablet 1    hydroCHLOROthiazide 12.5 MG tablet Take 1 tablet by mouth daily 90 tablet 1    acetaminophen (TYLENOL) 650 MG extended release tablet Take 1 tab by mouth at night M-W-F as needed 60 tablet 1     No current facility-administered medications for this visit.     Allergies   Allergen Reactions    Aspirin Other (See Comments)     Other reaction(s): Unknown (comments)  Pt states she is asthmatic and \"knows not to take ASA\"  Unsure of reaction      Codeine Itching     Also nausea, diarrhea, dizziness    Iodine Hives    Shellfish Allergy Hives and Other (See Comments)     Asthma attack       Objective:  BP (!) 148/54   Pulse 77   Temp 97.7 °F (36.5 °C) (Temporal)   Resp 18   Ht 1.499 m (4' 11\")   Wt 71.5 kg (157 lb 9.6 oz)   SpO2 100%   BMI 31.83 kg/m²   Physical Exam:   General appearance - alert, well appearing in no distress  Mental status - alert, oriented to person, place, and time  Neck - supple, no significant adenopathy   Chest - no wheezes, rales or rhonchi, symmetric air entry   Heart - normal S1, S2, no murmurs, rubs  Abdomen - soft, nontender, nondistended, no organomegaly  Ext-peripheral pulses normal, no pedal edema  Skin- left shoulder lesion about 3 cm long, new dressing applied    Assessment/Plan:  Lety was seen today for follow-up and cyst.    Diagnoses and all orders for this visit:    Dermoid cyst of left upper extremity  -     [facility] - Abner Wilkins MD, General Surgery, [place]    Essential (primary) hypertension    Type 2 diabetes mellitus with nephropathy (HCC)    Encounter for diabetic foot exam (HCC)  -     External Referral To Podiatry      Return in about 3 months (around 8/8/2025) for routine follow up.

## 2025-05-14 ENCOUNTER — OFFICE VISIT (OUTPATIENT)
Age: 85
End: 2025-05-14
Payer: COMMERCIAL

## 2025-05-14 VITALS
BODY MASS INDEX: 32.3 KG/M2 | RESPIRATION RATE: 18 BRPM | OXYGEN SATURATION: 98 % | HEIGHT: 59 IN | DIASTOLIC BLOOD PRESSURE: 68 MMHG | SYSTOLIC BLOOD PRESSURE: 144 MMHG | WEIGHT: 160.2 LBS | TEMPERATURE: 98.1 F | HEART RATE: 73 BPM

## 2025-05-14 DIAGNOSIS — L72.0 EPIDERMAL CYST: Primary | ICD-10-CM

## 2025-05-14 PROCEDURE — 3078F DIAST BP <80 MM HG: CPT | Performed by: SURGERY

## 2025-05-14 PROCEDURE — 3077F SYST BP >= 140 MM HG: CPT | Performed by: SURGERY

## 2025-05-14 PROCEDURE — 1123F ACP DISCUSS/DSCN MKR DOCD: CPT | Performed by: SURGERY

## 2025-05-14 PROCEDURE — 99203 OFFICE O/P NEW LOW 30 MIN: CPT | Performed by: SURGERY

## 2025-05-14 RX ORDER — SULFAMETHOXAZOLE AND TRIMETHOPRIM 800; 160 MG/1; MG/1
TABLET ORAL
COMMUNITY
Start: 2025-05-09

## 2025-05-14 ASSESSMENT — PATIENT HEALTH QUESTIONNAIRE - PHQ9
2. FEELING DOWN, DEPRESSED OR HOPELESS: NOT AT ALL
1. LITTLE INTEREST OR PLEASURE IN DOING THINGS: NOT AT ALL
SUM OF ALL RESPONSES TO PHQ QUESTIONS 1-9: 0

## 2025-05-14 NOTE — PROGRESS NOTES
Subjective:       Lety Banerjee is a 84 y.o. female who presents for evaluation of cyst on the back of her left shoulder. She noticed it for about 2 weeks. She had one there about 5 years ago. She had it drained. [facility] She is currently on antibiotics.  Last A1c was 6.1.       Past Medical History:   Diagnosis Date    Arthritis     Asthma     Chronic kidney disease     RIGHT KIDNEY STONE    Chronic left shoulder pain     Chronic pain     Diabetes (HCC)     History of blood transfusion     History of pancreatitis 2018    Hypertension     Hypothyroidism     Mass of kidney     BEING WATCHED BY UROLOGY    Mixed hyperlipidemia 10/28/2011    Thyroid disease     s/p partial thyroidectomy     Past Surgical History:   Procedure Laterality Date    EGD INTRMURAL NEEDLE ASPIR/BIOP ALTERED ANATOMY  10/31/2011    PT DENIES    EYE SURGERY      L CATARACT REMOVED    GYN       x 2    JOINT REPLACEMENT      L HIP    OTHER SURGICAL HISTORY      thyroidectomy - partial    TOTAL HIP ARTHROPLASTY Right 2023    RIGHT HIP TOTAL ARTHROPLASTY ANTERIOR APPROACH NAVIGATION (VELYS) performed by Michael Harp MD at [facility] MAIN OR    TOTAL KNEE ARTHROPLASTY Right 10/16/2024    RIGHT TOTAL KNEE ARTHROPLASTY (VELYS) performed by Michael Harp MD at [facility] MAIN OR     Social History     Socioeconomic History    Marital status:      Spouse name: None    Number of children: None    Years of education: None    Highest education level: None   Tobacco Use    Smoking status: Never    Smokeless tobacco: Never   Vaping Use    Vaping status: Never Used   Substance and Sexual Activity    Alcohol use: Not Currently     Alcohol/week: 1.0 standard drink of alcohol    Drug use: No    Sexual activity: Not Currently   Social History Narrative    ** Merged History Encounter **         ** Merged History Encounter **          Social Drivers of Health     Financial Resource Strain: Low Risk

## 2025-05-14 NOTE — PROGRESS NOTES
Identified pt with two pt identifiers (name and ). Reviewed chart in preparation for visit and have obtained necessary documentation.    Lety Banerjee is a 84 y.o. female  Chief Complaint   Patient presents with    New Patient     Seen at the request of Dr. Ez mcdonnell left shoulder cyst.     BP (!) 144/68 (BP Site: Right Upper Arm, Patient Position: Sitting, BP Cuff Size: Large Adult)   Pulse 73   Temp 98.1 °F (36.7 °C) (Oral)   Resp 18   Ht 1.499 m (4' 11\")   Wt 72.7 kg (160 lb 3.2 oz)   SpO2 98%   BMI 32.36 kg/m²     1. Have you been to the ER, urgent care clinic since your last visit?  Hospitalized since your last visit?yes - [facility]    2. Have you seen or consulted any other health care providers outside of the [facility] since your last visit?  Include any pap smears or colon screening. No    Patient and provider made aware of elevated BP x2. Patient asymptomatic. Patient reminded to monitor BP, continue to take BP medications if prescribed, and follow up with PCP/Cardiologist.  Patient expressed understanding and agreement.

## 2025-05-21 ENCOUNTER — PROCEDURE VISIT (OUTPATIENT)
Age: 85
End: 2025-05-21

## 2025-05-21 ENCOUNTER — HOSPITAL ENCOUNTER (OUTPATIENT)
Facility: HOSPITAL | Age: 85
Setting detail: SPECIMEN
Discharge: HOME | End: 2025-05-24

## 2025-05-21 VITALS
HEIGHT: 59 IN | WEIGHT: 159 LBS | BODY MASS INDEX: 32.05 KG/M2 | HEART RATE: 93 BPM | TEMPERATURE: 98.3 F | SYSTOLIC BLOOD PRESSURE: 138 MMHG | RESPIRATION RATE: 18 BRPM | DIASTOLIC BLOOD PRESSURE: 78 MMHG | OXYGEN SATURATION: 98 %

## 2025-05-21 DIAGNOSIS — L72.0 EPIDERMAL CYST: Primary | ICD-10-CM

## 2025-05-21 RX ORDER — LIDOCAINE HYDROCHLORIDE AND EPINEPHRINE BITARTRATE 20; .01 MG/ML; MG/ML
10 INJECTION, SOLUTION SUBCUTANEOUS ONCE
Status: COMPLETED | OUTPATIENT
Start: 2025-05-21 | End: 2025-05-21

## 2025-05-21 RX ADMIN — LIDOCAINE HYDROCHLORIDE AND EPINEPHRINE BITARTRATE 10 ML: 20; .01 INJECTION, SOLUTION SUBCUTANEOUS at 10:00

## 2025-05-21 ASSESSMENT — PATIENT HEALTH QUESTIONNAIRE - PHQ9
SUM OF ALL RESPONSES TO PHQ QUESTIONS 1-9: 0
2. FEELING DOWN, DEPRESSED OR HOPELESS: NOT AT ALL
1. LITTLE INTEREST OR PLEASURE IN DOING THINGS: NOT AT ALL
SUM OF ALL RESPONSES TO PHQ QUESTIONS 1-9: 0

## 2025-05-21 NOTE — PROGRESS NOTES
PROCEDURE NOTE      Preperative Diagnosis: epidermal cyst  Post-operative Diagnosis: epidermal cyst    Procedure: Excision of epidermal  cyst and intermediate wound closure    Surgeon: Nikole Graf MD  Anesthesia: Local  Estimated Blood Loss: Minimal   Complications: None; patient tolerated the procedure well.    Lety Banerjee is a 84 y.o. female who has been brought to the procedure room for excision of a epidermal cyst cm sebaceous cyst located on the right shoulder.    The risks, benefits, and alternatives were explained and consent was obtained for the procedure. The area was sterile prepped and draped in the usual manner.  1% lidocaine with epinephrine was infiltrated into the skin and soft tissue surrounding the lesion.  An incision was made.  This was a thin walled cyst containing some white caseous material consistent with a sebaceous cyst.  It was sharply dissected free of surrounding tissues and excised in its entirety and sent to pathology. It measured 3 cm    Hemostasis was noted.  The wound was closed with interrupted 3-0 Vicryl, 4-0 Monocryl; followed by  Dermabond.  Wound care instructions were given.  She tolerated the procedure without difficulty.

## 2025-05-21 NOTE — PROGRESS NOTES
[unfilled]  OFFICE PROCEDURE PROGRESS NOTE        Chart reviewed for the following:   Miriam GOTTI LPN, have reviewed the History, Physical and updated the Allergic reactions for Lety Banerjee     TIME OUT performed immediately prior to start of procedure:   Miriam GOTTI LPN, have performed the following reviews on Lety Banerjee prior to the start of the procedure:            * Patient was identified by name and date of birth   * Agreement on procedure being performed and correct location was verified  * Risks and Benefits explained to the patient  * Procedure site verified and marked as necessary  * Patient was positioned for comfort  * Consent was signed and verified     Time: 10:00 am      Date of procedure: 5/21/2025    Procedure performed by:  Nikole Graf MD    Provider assisted by: Miriam Barber LPN    Patient assisted by: Magdiel    How tolerated by patient: tolerated the procedure well with no complications    Post Procedural Pain Scale: 0 - No Hurt    Comments: none    Specimen(s) sent to lab Yes    Name of local anesthetics/%/w w/o Epi: Lidocaine with Epi    Lot#: [id number]    Exp. Date:02/01/2026

## 2025-05-21 NOTE — PROGRESS NOTES
Identified pt with two pt identifiers (name and ). Reviewed chart in preparation for visit and have obtained necessary documentation.    Lety Banerjee is a 84 y.o. female Procedure (Excision of left shoulder cyst.)  .    Vitals:    25 0909   BP: 138/78   BP Site: Right Upper Arm   Patient Position: Sitting   BP Cuff Size: Large Adult   Pulse: 93   Resp: 18   Temp: 98.3 °F (36.8 °C)   TempSrc: Oral   SpO2: 98%   Weight: 72.1 kg (159 lb)   Height: 1.499 m (4' 11\")          1. Have you been to the ER, urgent care clinic since your last visit?  Hospitalized since your last visit?  no     2. Have you seen or consulted any other health care providers outside of the [facility] since your last visit?  Include any pap smears or colon screening.  no

## 2025-05-21 NOTE — PATIENT INSTRUCTIONS
Dr. Graf Discharge Instructions for:  Lety Banerjee    MRN: [id number] : 1940    Admitted: [unfilled]   Discharged: 2025       What to do at Home    Recommended diet: Resume your normal diet    Recommended activity: as tolerated    Follow-up with Dr. Graf in 1-2 weeks.  Call [phone] for an appointment.    Wound Care:  Keep wound clean and dry      How to Care for Your Wound After It’s Treated With DERMABOND* Topical Skin Adhesive    DERMABOND* Topical Skin Adhesive (2-octyl cyanoacrylate) is a sterile, liquid skin adhesivethat holds wound edges together. The film will usually remain in place for 5 to 10 days, thennaturally fall off your skin.  The following will answer some of your questions and provide instructions for proper care for yourwound while it is healing:    CHECK WOUND APPEARANCE   Some swelling, redness, and pain are common with all wounds and normally will go away as the wound heals. If swelling, redness, or pain increases or if the wound feels warm to the touch, contact a doctor. Also contact a doctor if the wound edges reopen or separate.    REPLACE BANDAGES   If your wound is bandaged, keep the bandage dry.   Replace the dressing daily until the adhesive film has fallen off or if the bandage should become wet, unless otherwise instructed by your physician.   When changing the dressing, do not place tape directly over the DERMABOND adhesive film, because removing the tape later may also remove the film.    AVOID TOPICAL MEDICATIONS   Do not apply liquid or ointment medications or any other product to your wound while the DERMABOND adhesive film is in place. These may loosen the film before your wound is healed.    KEEP WOUND DRY AND PROTECTED   You may occasionally and briefly wet your wound in the shower or bath. Do not soak or scrub your wound, do not swim, and avoid periods of heavy perspiration until the DERMABOND adhesive has naturally fallen off. After showering

## 2025-06-04 ENCOUNTER — OFFICE VISIT (OUTPATIENT)
Age: 85
End: 2025-06-04

## 2025-06-04 VITALS
DIASTOLIC BLOOD PRESSURE: 70 MMHG | SYSTOLIC BLOOD PRESSURE: 148 MMHG | HEART RATE: 76 BPM | OXYGEN SATURATION: 98 % | HEIGHT: 59 IN | WEIGHT: 160 LBS | TEMPERATURE: 98.1 F | RESPIRATION RATE: 18 BRPM | BODY MASS INDEX: 32.25 KG/M2

## 2025-06-04 DIAGNOSIS — L72.0 EPIDERMAL CYST: Primary | ICD-10-CM

## 2025-06-04 PROCEDURE — 99024 POSTOP FOLLOW-UP VISIT: CPT | Performed by: SURGERY

## 2025-06-04 ASSESSMENT — PATIENT HEALTH QUESTIONNAIRE - PHQ9
SUM OF ALL RESPONSES TO PHQ QUESTIONS 1-9: 0
1. LITTLE INTEREST OR PLEASURE IN DOING THINGS: NOT AT ALL
SUM OF ALL RESPONSES TO PHQ QUESTIONS 1-9: 0
2. FEELING DOWN, DEPRESSED OR HOPELESS: NOT AT ALL
SUM OF ALL RESPONSES TO PHQ QUESTIONS 1-9: 0
SUM OF ALL RESPONSES TO PHQ QUESTIONS 1-9: 0

## 2025-06-04 NOTE — PROGRESS NOTES
Identified pt with two pt identifiers (name and ). Reviewed chart in preparation for visit and have obtained necessary documentation.    Lety Banerjee is a 84 y.o. female Post-Op Check (Post Op Excision Left Shoulder Cyst)  .    Vitals:    25 1035 25 1047   BP: (!) 158/72 (!) 148/70   BP Site: Right Upper Arm Right Upper Arm   Patient Position: Sitting Sitting   BP Cuff Size: Large Adult Large Adult   Pulse: 76    Resp: 18    Temp: 98.1 °F (36.7 °C)    TempSrc: Oral    SpO2: 98%    Weight: 72.6 kg (160 lb)    Height: 1.499 m (4' 11\")           1. Have you been to the ER, urgent care clinic since your last visit?  Hospitalized since your last visit?  no     2. Have you seen or consulted any other health care providers outside of the [facility] since your last visit?  Include any pap smears or colon screening.  No  BP elevated. Patient advised to  follow up with PCP and check BP twice a day at home.

## 2025-06-04 NOTE — PROGRESS NOTES
SUBJECTIVE: Lety Banerjee is a 84 y.o. female is seen for a routine postop check s/p cyst excision. Reports no problems with the wound or other issues.  Activity, diet and bowels are normal. No pain. No fevers    OBJECTIVE:   Vitals:    06/04/25 1047   BP: (!) 148/70   Pulse:    Resp:    Temp:    SpO2:        General: Appears well.   Incision: healing well, no drainage, no erythema, no seroma, incision well approximated, no swelling    ASSESSMENT: normal postoperative course, doing well.    PLAN:   Incision healing well  May resume physical therapy  Follow up prn

## 2025-07-07 DIAGNOSIS — I10 PRIMARY HYPERTENSION: ICD-10-CM

## 2025-07-07 DIAGNOSIS — I10 ESSENTIAL (PRIMARY) HYPERTENSION: ICD-10-CM

## 2025-07-07 DIAGNOSIS — Z79.4 TYPE 2 DIABETES MELLITUS WITHOUT COMPLICATION, WITH LONG-TERM CURRENT USE OF INSULIN (HCC): ICD-10-CM

## 2025-07-07 DIAGNOSIS — E11.9 TYPE 2 DIABETES MELLITUS WITHOUT COMPLICATION, WITH LONG-TERM CURRENT USE OF INSULIN (HCC): ICD-10-CM

## 2025-07-09 RX ORDER — HYDROCHLOROTHIAZIDE 12.5 MG/1
12.5 TABLET ORAL DAILY
Qty: 90 TABLET | Refills: 1 | Status: SHIPPED | OUTPATIENT
Start: 2025-07-09

## 2025-07-09 RX ORDER — GLIPIZIDE 5 MG/1
TABLET ORAL
Qty: 180 TABLET | Refills: 1 | Status: SHIPPED | OUTPATIENT
Start: 2025-07-09

## 2025-07-29 RX ORDER — MULTIVIT-MIN/IRON/FA/VIT K/LUT 8MG-400MCG
1 TABLET ORAL EVERY MORNING
Qty: 30 TABLET | Refills: 5 | Status: SHIPPED | OUTPATIENT
Start: 2025-07-29

## 2025-08-04 ENCOUNTER — HOSPITAL ENCOUNTER (OUTPATIENT)
Facility: HOSPITAL | Age: 85
Setting detail: OBSERVATION
Discharge: HOME | End: 2025-08-05
Attending: EMERGENCY MEDICINE | Admitting: INTERNAL MEDICINE
Payer: COMMERCIAL

## 2025-08-04 ENCOUNTER — APPOINTMENT (OUTPATIENT)
Facility: HOSPITAL | Age: 85
End: 2025-08-04
Payer: COMMERCIAL

## 2025-08-04 ENCOUNTER — TELEPHONE (OUTPATIENT)
Age: 85
End: 2025-08-04

## 2025-08-04 DIAGNOSIS — I10 PRIMARY HYPERTENSION: ICD-10-CM

## 2025-08-04 DIAGNOSIS — E87.6 HYPOKALEMIA: ICD-10-CM

## 2025-08-04 DIAGNOSIS — I49.3 PVC'S (PREMATURE VENTRICULAR CONTRACTIONS): Primary | ICD-10-CM

## 2025-08-04 DIAGNOSIS — R79.89 ELEVATED TROPONIN: ICD-10-CM

## 2025-08-04 DIAGNOSIS — I49.3 PVC (PREMATURE VENTRICULAR CONTRACTION): ICD-10-CM

## 2025-08-04 DIAGNOSIS — I10 ESSENTIAL (PRIMARY) HYPERTENSION: ICD-10-CM

## 2025-08-04 LAB
ALBUMIN SERPL-MCNC: 3.5 G/DL (ref 3.5–5.2)
ALBUMIN/GLOB SERPL: 1.1 (ref 1.1–2.2)
ALP SERPL-CCNC: 101 U/L (ref 35–104)
ALT SERPL-CCNC: 16 U/L (ref 10–35)
ANION GAP SERPL CALC-SCNC: 13 MMOL/L (ref 2–14)
AST SERPL-CCNC: 21 U/L (ref 10–35)
BASOPHILS # BLD: 0.03 K/UL (ref 0–0.1)
BASOPHILS NFR BLD: 0.4 % (ref 0–1)
BILIRUB SERPL-MCNC: 1 MG/DL (ref 0–1.2)
BUN SERPL-MCNC: 29 MG/DL (ref 8–23)
BUN/CREAT SERPL: 23 (ref 12–20)
CALCIUM SERPL-MCNC: 10.3 MG/DL (ref 8.8–10.2)
CHLORIDE SERPL-SCNC: 99 MMOL/L (ref 98–107)
CO2 SERPL-SCNC: 30 MMOL/L (ref 20–29)
COMMENT:: NORMAL
CREAT SERPL-MCNC: 1.23 MG/DL (ref 0.6–1)
DIFFERENTIAL METHOD BLD: NORMAL
EKG ATRIAL RATE: 44 BPM
EKG ATRIAL RATE: 82 BPM
EKG DIAGNOSIS: NORMAL
EKG DIAGNOSIS: NORMAL
EKG P AXIS: 1 DEGREES
EKG P AXIS: 63 DEGREES
EKG P-R INTERVAL: 142 MS
EKG P-R INTERVAL: 456 MS
EKG Q-T INTERVAL: 362 MS
EKG Q-T INTERVAL: 374 MS
EKG QRS DURATION: 132 MS
EKG QRS DURATION: 76 MS
EKG QTC CALCULATION (BAZETT): 385 MS
EKG QTC CALCULATION (BAZETT): 422 MS
EKG R AXIS: -45 DEGREES
EKG R AXIS: 250 DEGREES
EKG T AXIS: 34 DEGREES
EKG T AXIS: 78 DEGREES
EKG VENTRICULAR RATE: 64 BPM
EKG VENTRICULAR RATE: 82 BPM
EOSINOPHIL # BLD: 0.02 K/UL (ref 0–0.4)
EOSINOPHIL NFR BLD: 0.3 % (ref 0–7)
ERYTHROCYTE [DISTWIDTH] IN BLOOD BY AUTOMATED COUNT: 13.6 % (ref 11.5–14.5)
GLOBULIN SER CALC-MCNC: 3.3 G/DL (ref 2–4)
GLUCOSE BLD STRIP.AUTO-MCNC: 143 MG/DL (ref 65–117)
GLUCOSE SERPL-MCNC: 225 MG/DL (ref 65–100)
HCT VFR BLD AUTO: 39.8 % (ref 35–47)
HGB BLD-MCNC: 13.6 G/DL (ref 11.5–16)
IMM GRANULOCYTES # BLD AUTO: 0.02 K/UL (ref 0–0.04)
IMM GRANULOCYTES NFR BLD AUTO: 0.3 % (ref 0–0.5)
LYMPHOCYTES # BLD: 2.34 K/UL (ref 0.8–3.5)
LYMPHOCYTES NFR BLD: 34.4 % (ref 12–49)
MAGNESIUM SERPL-MCNC: 1.9 MG/DL (ref 1.6–2.4)
MCH RBC QN AUTO: 29.2 PG (ref 26–34)
MCHC RBC AUTO-ENTMCNC: 34.2 G/DL (ref 30–36.5)
MCV RBC AUTO: 85.4 FL (ref 80–99)
MONOCYTES # BLD: 0.51 K/UL (ref 0–1)
MONOCYTES NFR BLD: 7.5 % (ref 5–13)
NEUTS SEG # BLD: 3.88 K/UL (ref 1.8–8)
NEUTS SEG NFR BLD: 57.1 % (ref 32–75)
NRBC # BLD: 0 K/UL (ref 0–0.01)
NRBC BLD-RTO: 0 PER 100 WBC
PLATELET # BLD AUTO: 276 K/UL (ref 150–400)
PMV BLD AUTO: 9.5 FL (ref 8.9–12.9)
POTASSIUM SERPL-SCNC: 3.3 MMOL/L (ref 3.5–5.1)
PROT SERPL-MCNC: 6.9 G/DL (ref 6.4–8.3)
RBC # BLD AUTO: 4.66 M/UL (ref 3.8–5.2)
SERVICE CMNT-IMP: ABNORMAL
SODIUM SERPL-SCNC: 141 MMOL/L (ref 136–145)
SPECIMEN HOLD: NORMAL
T4 FREE SERPL-MCNC: 1.6 NG/DL (ref 0.9–1.6)
TROPONIN T SERPL HS-MCNC: 95.5 NG/L (ref 0–14)
TROPONIN T SERPL HS-MCNC: 97.3 NG/L (ref 0–14)
TROPONIN T SERPL HS-MCNC: 99.4 NG/L (ref 0–14)
TSH, 3RD GENERATION: 0.3 UIU/ML (ref 0.27–4.2)
WBC # BLD AUTO: 6.8 K/UL (ref 3.6–11)

## 2025-08-04 PROCEDURE — 84439 ASSAY OF FREE THYROXINE: CPT

## 2025-08-04 PROCEDURE — 71045 X-RAY EXAM CHEST 1 VIEW: CPT

## 2025-08-04 PROCEDURE — 93005 ELECTROCARDIOGRAM TRACING: CPT | Performed by: EMERGENCY MEDICINE

## 2025-08-04 PROCEDURE — 85025 COMPLETE CBC W/AUTO DIFF WBC: CPT

## 2025-08-04 PROCEDURE — 6370000000 HC RX 637 (ALT 250 FOR IP): Performed by: INTERNAL MEDICINE

## 2025-08-04 PROCEDURE — 82962 GLUCOSE BLOOD TEST: CPT

## 2025-08-04 PROCEDURE — 2500000003 HC RX 250 WO HCPCS: Performed by: INTERNAL MEDICINE

## 2025-08-04 PROCEDURE — 93010 ELECTROCARDIOGRAM REPORT: CPT | Performed by: SPECIALIST

## 2025-08-04 PROCEDURE — 2060000000 HC ICU INTERMEDIATE R&B

## 2025-08-04 PROCEDURE — 84484 ASSAY OF TROPONIN QUANT: CPT

## 2025-08-04 PROCEDURE — 99285 EMERGENCY DEPT VISIT HI MDM: CPT

## 2025-08-04 PROCEDURE — 6370000000 HC RX 637 (ALT 250 FOR IP): Performed by: EMERGENCY MEDICINE

## 2025-08-04 PROCEDURE — 83735 ASSAY OF MAGNESIUM: CPT

## 2025-08-04 PROCEDURE — 84443 ASSAY THYROID STIM HORMONE: CPT

## 2025-08-04 PROCEDURE — 80053 COMPREHEN METABOLIC PANEL: CPT

## 2025-08-04 PROCEDURE — 6360000002 HC RX W HCPCS: Performed by: INTERNAL MEDICINE

## 2025-08-04 RX ORDER — SODIUM CHLORIDE 9 MG/ML
INJECTION, SOLUTION INTRAVENOUS PRN
Status: DISCONTINUED | OUTPATIENT
Start: 2025-08-04 | End: 2025-08-05 | Stop reason: HOSPADM

## 2025-08-04 RX ORDER — POTASSIUM CHLORIDE 750 MG/1
40 TABLET, EXTENDED RELEASE ORAL
Status: COMPLETED | OUTPATIENT
Start: 2025-08-04 | End: 2025-08-04

## 2025-08-04 RX ORDER — AMLODIPINE BESYLATE 5 MG/1
10 TABLET ORAL DAILY
Status: DISCONTINUED | OUTPATIENT
Start: 2025-08-05 | End: 2025-08-05 | Stop reason: HOSPADM

## 2025-08-04 RX ORDER — SODIUM CHLORIDE 0.9 % (FLUSH) 0.9 %
5-40 SYRINGE (ML) INJECTION EVERY 12 HOURS SCHEDULED
Status: DISCONTINUED | OUTPATIENT
Start: 2025-08-04 | End: 2025-08-05 | Stop reason: HOSPADM

## 2025-08-04 RX ORDER — ENOXAPARIN SODIUM 100 MG/ML
40 INJECTION SUBCUTANEOUS DAILY
Status: DISCONTINUED | OUTPATIENT
Start: 2025-08-04 | End: 2025-08-05 | Stop reason: HOSPADM

## 2025-08-04 RX ORDER — ALBUTEROL SULFATE 90 UG/1
2 INHALANT RESPIRATORY (INHALATION) EVERY 6 HOURS PRN
Status: DISCONTINUED | OUTPATIENT
Start: 2025-08-04 | End: 2025-08-04 | Stop reason: CLARIF

## 2025-08-04 RX ORDER — LEVOTHYROXINE SODIUM 150 UG/1
75 TABLET ORAL
Status: DISCONTINUED | OUTPATIENT
Start: 2025-08-05 | End: 2025-08-05 | Stop reason: HOSPADM

## 2025-08-04 RX ORDER — POLYETHYLENE GLYCOL 3350 17 G/17G
17 POWDER, FOR SOLUTION ORAL DAILY PRN
Status: DISCONTINUED | OUTPATIENT
Start: 2025-08-04 | End: 2025-08-05 | Stop reason: HOSPADM

## 2025-08-04 RX ORDER — ALBUTEROL SULFATE 0.83 MG/ML
2.5 SOLUTION RESPIRATORY (INHALATION) EVERY 6 HOURS PRN
Status: DISCONTINUED | OUTPATIENT
Start: 2025-08-04 | End: 2025-08-05 | Stop reason: HOSPADM

## 2025-08-04 RX ORDER — ACETAMINOPHEN 325 MG/1
650 TABLET ORAL EVERY 6 HOURS PRN
Status: DISCONTINUED | OUTPATIENT
Start: 2025-08-04 | End: 2025-08-05 | Stop reason: HOSPADM

## 2025-08-04 RX ORDER — INSULIN LISPRO 100 [IU]/ML
0-4 INJECTION, SOLUTION INTRAVENOUS; SUBCUTANEOUS
Status: DISCONTINUED | OUTPATIENT
Start: 2025-08-04 | End: 2025-08-04 | Stop reason: SDUPTHER

## 2025-08-04 RX ORDER — INSULIN LISPRO 100 [IU]/ML
0-4 INJECTION, SOLUTION INTRAVENOUS; SUBCUTANEOUS
Status: DISCONTINUED | OUTPATIENT
Start: 2025-08-04 | End: 2025-08-05 | Stop reason: HOSPADM

## 2025-08-04 RX ORDER — VALSARTAN 160 MG/1
320 TABLET ORAL DAILY
Status: DISCONTINUED | OUTPATIENT
Start: 2025-08-05 | End: 2025-08-05 | Stop reason: HOSPADM

## 2025-08-04 RX ORDER — DEXTROSE MONOHYDRATE 100 MG/ML
INJECTION, SOLUTION INTRAVENOUS CONTINUOUS PRN
Status: DISCONTINUED | OUTPATIENT
Start: 2025-08-04 | End: 2025-08-05 | Stop reason: HOSPADM

## 2025-08-04 RX ORDER — SODIUM CHLORIDE 0.9 % (FLUSH) 0.9 %
5-40 SYRINGE (ML) INJECTION PRN
Status: DISCONTINUED | OUTPATIENT
Start: 2025-08-04 | End: 2025-08-05 | Stop reason: HOSPADM

## 2025-08-04 RX ORDER — ATORVASTATIN CALCIUM 40 MG/1
80 TABLET, FILM COATED ORAL NIGHTLY
Status: DISCONTINUED | OUTPATIENT
Start: 2025-08-04 | End: 2025-08-05 | Stop reason: HOSPADM

## 2025-08-04 RX ORDER — HYDRALAZINE HYDROCHLORIDE 20 MG/ML
10 INJECTION INTRAMUSCULAR; INTRAVENOUS EVERY 6 HOURS PRN
Status: DISCONTINUED | OUTPATIENT
Start: 2025-08-04 | End: 2025-08-05 | Stop reason: HOSPADM

## 2025-08-04 RX ORDER — ONDANSETRON 2 MG/ML
4 INJECTION INTRAMUSCULAR; INTRAVENOUS EVERY 6 HOURS PRN
Status: DISCONTINUED | OUTPATIENT
Start: 2025-08-04 | End: 2025-08-05 | Stop reason: HOSPADM

## 2025-08-04 RX ADMIN — ATORVASTATIN CALCIUM 80 MG: 40 TABLET, FILM COATED ORAL at 22:11

## 2025-08-04 RX ADMIN — POTASSIUM CHLORIDE 40 MEQ: 750 TABLET, FILM COATED, EXTENDED RELEASE ORAL at 15:05

## 2025-08-04 RX ADMIN — SODIUM CHLORIDE, PRESERVATIVE FREE 10 ML: 5 INJECTION INTRAVENOUS at 22:11

## 2025-08-04 RX ADMIN — ENOXAPARIN SODIUM 40 MG: 100 INJECTION SUBCUTANEOUS at 18:34

## 2025-08-04 ASSESSMENT — LIFESTYLE VARIABLES
HOW MANY STANDARD DRINKS CONTAINING ALCOHOL DO YOU HAVE ON A TYPICAL DAY: PATIENT DOES NOT DRINK
HOW OFTEN DO YOU HAVE A DRINK CONTAINING ALCOHOL: NEVER

## 2025-08-04 ASSESSMENT — PAIN - FUNCTIONAL ASSESSMENT: PAIN_FUNCTIONAL_ASSESSMENT: NONE - DENIES PAIN

## 2025-08-05 ENCOUNTER — APPOINTMENT (OUTPATIENT)
Facility: HOSPITAL | Age: 85
End: 2025-08-05
Attending: INTERNAL MEDICINE
Payer: COMMERCIAL

## 2025-08-05 VITALS
RESPIRATION RATE: 18 BRPM | HEIGHT: 59 IN | HEART RATE: 84 BPM | DIASTOLIC BLOOD PRESSURE: 58 MMHG | OXYGEN SATURATION: 98 % | WEIGHT: 153 LBS | SYSTOLIC BLOOD PRESSURE: 125 MMHG | TEMPERATURE: 98.2 F | BODY MASS INDEX: 30.84 KG/M2

## 2025-08-05 PROBLEM — R00.1 BRADYCARDIA: Status: ACTIVE | Noted: 2025-08-05

## 2025-08-05 LAB
ALBUMIN SERPL-MCNC: 3.3 G/DL (ref 3.5–5.2)
ANION GAP SERPL CALC-SCNC: 9 MMOL/L (ref 2–14)
BASOPHILS # BLD: 0.04 K/UL (ref 0–0.1)
BASOPHILS NFR BLD: 0.6 % (ref 0–1)
BUN SERPL-MCNC: 24 MG/DL (ref 8–23)
BUN/CREAT SERPL: 20 (ref 12–20)
CALCIUM SERPL-MCNC: 9.6 MG/DL (ref 8.8–10.2)
CHLORIDE SERPL-SCNC: 103 MMOL/L (ref 98–107)
CO2 SERPL-SCNC: 30 MMOL/L (ref 20–29)
CREAT SERPL-MCNC: 1.23 MG/DL (ref 0.6–1)
DIFFERENTIAL METHOD BLD: NORMAL
ECHO AV AREA PEAK VELOCITY: 1.4 CM2
ECHO AV AREA/BSA PEAK VELOCITY: 0.8 CM2/M2
ECHO AV PEAK GRADIENT: 7 MMHG
ECHO AV PEAK VELOCITY: 1.3 M/S
ECHO AV VELOCITY RATIO: 0.77
ECHO BSA: 1.7 M2
ECHO EST RA PRESSURE: 3 MMHG
ECHO LV E' LATERAL VELOCITY: 6.8 CM/S
ECHO LV E' SEPTAL VELOCITY: 6.53 CM/S
ECHO LV EF PHYSICIAN: 55 %
ECHO LV FRACTIONAL SHORTENING: 33 % (ref 28–44)
ECHO LV INTERNAL DIMENSION DIASTOLE INDEX: 2.55 CM/M2
ECHO LV INTERNAL DIMENSION DIASTOLIC: 4.2 CM (ref 3.9–5.3)
ECHO LV INTERNAL DIMENSION SYSTOLIC INDEX: 1.7 CM/M2
ECHO LV INTERNAL DIMENSION SYSTOLIC: 2.8 CM
ECHO LV IVSD: 0.9 CM (ref 0.6–0.9)
ECHO LV MASS 2D: 127.8 G (ref 67–162)
ECHO LV MASS INDEX 2D: 77.5 G/M2 (ref 43–95)
ECHO LV POSTERIOR WALL DIASTOLIC: 1 CM (ref 0.6–0.9)
ECHO LV RELATIVE WALL THICKNESS RATIO: 0.48
ECHO LVOT AREA: 2 CM2
ECHO LVOT DIAM: 1.6 CM
ECHO LVOT PEAK GRADIENT: 4 MMHG
ECHO LVOT PEAK VELOCITY: 1 M/S
ECHO MV A VELOCITY: 1.26 M/S
ECHO MV E DECELERATION TIME (DT): 194.7 MS
ECHO MV E VELOCITY: 0.72 M/S
ECHO MV E/A RATIO: 0.57
ECHO MV E/E' LATERAL: 10.59
ECHO MV E/E' RATIO (AVERAGED): 10.81
ECHO MV E/E' SEPTAL: 11.03
ECHO MV MAX VELOCITY: 1.4 M/S
ECHO MV MEAN GRADIENT: 2 MMHG
ECHO MV MEAN VELOCITY: 0.6 M/S
ECHO MV PEAK GRADIENT: 8 MMHG
ECHO MV PRESSURE HALF TIME (PHT): 56.5 MS
ECHO MV PRESSURE HALF TIME (PHT): 64.7 MS
ECHO MV VTI: 27.1 CM
ECHO PV MAX VELOCITY: 0.5 M/S
ECHO PV PEAK GRADIENT: 1 MMHG
ECHO RV FREE WALL PEAK S': 12.4 CM/S
ECHO RV TAPSE: 1.4 CM (ref 1.7–?)
EOSINOPHIL # BLD: 0.05 K/UL (ref 0–0.4)
EOSINOPHIL NFR BLD: 0.8 % (ref 0–7)
ERYTHROCYTE [DISTWIDTH] IN BLOOD BY AUTOMATED COUNT: 13.8 % (ref 11.5–14.5)
EST. AVERAGE GLUCOSE BLD GHB EST-MCNC: 142 MG/DL
GLUCOSE BLD STRIP.AUTO-MCNC: 119 MG/DL (ref 65–117)
GLUCOSE BLD STRIP.AUTO-MCNC: 189 MG/DL (ref 65–117)
GLUCOSE SERPL-MCNC: 121 MG/DL (ref 65–100)
HBA1C MFR BLD: 6.6 % (ref 4–5.6)
HCT VFR BLD AUTO: 40 % (ref 35–47)
HGB BLD-MCNC: 13.4 G/DL (ref 11.5–16)
IMM GRANULOCYTES # BLD AUTO: 0.02 K/UL (ref 0–0.04)
IMM GRANULOCYTES NFR BLD AUTO: 0.3 % (ref 0–0.5)
LYMPHOCYTES # BLD: 3.1 K/UL (ref 0.8–3.5)
LYMPHOCYTES NFR BLD: 47.3 % (ref 12–49)
MAGNESIUM SERPL-MCNC: 1.9 MG/DL (ref 1.6–2.4)
MCH RBC QN AUTO: 29.1 PG (ref 26–34)
MCHC RBC AUTO-ENTMCNC: 33.5 G/DL (ref 30–36.5)
MCV RBC AUTO: 86.8 FL (ref 80–99)
MONOCYTES # BLD: 0.51 K/UL (ref 0–1)
MONOCYTES NFR BLD: 7.8 % (ref 5–13)
NEUTS SEG # BLD: 2.83 K/UL (ref 1.8–8)
NEUTS SEG NFR BLD: 43.2 % (ref 32–75)
NRBC # BLD: 0 K/UL (ref 0–0.01)
NRBC BLD-RTO: 0 PER 100 WBC
PHOSPHATE SERPL-MCNC: 3 MG/DL (ref 2.4–4.5)
PLATELET # BLD AUTO: 261 K/UL (ref 150–400)
PMV BLD AUTO: 9.4 FL (ref 8.9–12.9)
POTASSIUM SERPL-SCNC: 3.6 MMOL/L (ref 3.5–5.1)
RBC # BLD AUTO: 4.61 M/UL (ref 3.8–5.2)
SERVICE CMNT-IMP: ABNORMAL
SERVICE CMNT-IMP: ABNORMAL
SODIUM SERPL-SCNC: 142 MMOL/L (ref 136–145)
WBC # BLD AUTO: 6.6 K/UL (ref 3.6–11)

## 2025-08-05 PROCEDURE — 85025 COMPLETE CBC W/AUTO DIFF WBC: CPT

## 2025-08-05 PROCEDURE — 83735 ASSAY OF MAGNESIUM: CPT

## 2025-08-05 PROCEDURE — 97161 PT EVAL LOW COMPLEX 20 MIN: CPT

## 2025-08-05 PROCEDURE — 2500000003 HC RX 250 WO HCPCS: Performed by: INTERNAL MEDICINE

## 2025-08-05 PROCEDURE — 97165 OT EVAL LOW COMPLEX 30 MIN: CPT

## 2025-08-05 PROCEDURE — 97535 SELF CARE MNGMENT TRAINING: CPT

## 2025-08-05 PROCEDURE — 6370000000 HC RX 637 (ALT 250 FOR IP): Performed by: HOSPITALIST

## 2025-08-05 PROCEDURE — 80069 RENAL FUNCTION PANEL: CPT

## 2025-08-05 PROCEDURE — G0378 HOSPITAL OBSERVATION PER HR: HCPCS

## 2025-08-05 PROCEDURE — 97116 GAIT TRAINING THERAPY: CPT

## 2025-08-05 PROCEDURE — 93321 DOPPLER ECHO F-UP/LMTD STD: CPT | Performed by: SPECIALIST

## 2025-08-05 PROCEDURE — 6370000000 HC RX 637 (ALT 250 FOR IP): Performed by: INTERNAL MEDICINE

## 2025-08-05 PROCEDURE — 82962 GLUCOSE BLOOD TEST: CPT

## 2025-08-05 PROCEDURE — 93306 TTE W/DOPPLER COMPLETE: CPT

## 2025-08-05 PROCEDURE — 93325 DOPPLER ECHO COLOR FLOW MAPG: CPT | Performed by: SPECIALIST

## 2025-08-05 PROCEDURE — 97530 THERAPEUTIC ACTIVITIES: CPT

## 2025-08-05 PROCEDURE — 94760 N-INVAS EAR/PLS OXIMETRY 1: CPT

## 2025-08-05 PROCEDURE — 83036 HEMOGLOBIN GLYCOSYLATED A1C: CPT

## 2025-08-05 PROCEDURE — 93308 TTE F-UP OR LMTD: CPT | Performed by: SPECIALIST

## 2025-08-05 RX ORDER — AMLODIPINE BESYLATE 10 MG/1
5 TABLET ORAL DAILY
Qty: 90 TABLET | Refills: 1 | Status: SHIPPED | OUTPATIENT
Start: 2025-08-05

## 2025-08-05 RX ADMIN — AMLODIPINE BESYLATE 10 MG: 5 TABLET ORAL at 08:35

## 2025-08-05 RX ADMIN — INSULIN LISPRO 1 UNITS: 100 INJECTION, SOLUTION INTRAVENOUS; SUBCUTANEOUS at 12:55

## 2025-08-05 RX ADMIN — SODIUM CHLORIDE, PRESERVATIVE FREE 10 ML: 5 INJECTION INTRAVENOUS at 08:45

## 2025-08-05 RX ADMIN — VALSARTAN 320 MG: 160 TABLET, FILM COATED ORAL at 08:35

## 2025-08-05 RX ADMIN — POTASSIUM BICARBONATE 40 MEQ: 782 TABLET, EFFERVESCENT ORAL at 09:42

## 2025-08-05 RX ADMIN — LEVOTHYROXINE SODIUM 75 MCG: 0.15 TABLET ORAL at 06:46

## 2025-08-07 ENCOUNTER — TELEPHONE (OUTPATIENT)
Age: 85
End: 2025-08-07

## 2025-08-11 ENCOUNTER — OFFICE VISIT (OUTPATIENT)
Age: 85
End: 2025-08-11
Payer: COMMERCIAL

## 2025-08-11 VITALS
DIASTOLIC BLOOD PRESSURE: 58 MMHG | HEART RATE: 78 BPM | RESPIRATION RATE: 18 BRPM | BODY MASS INDEX: 32.38 KG/M2 | HEIGHT: 59 IN | TEMPERATURE: 98.8 F | WEIGHT: 160.6 LBS | SYSTOLIC BLOOD PRESSURE: 108 MMHG | OXYGEN SATURATION: 98 %

## 2025-08-11 DIAGNOSIS — E87.6 HYPOKALEMIA: ICD-10-CM

## 2025-08-11 DIAGNOSIS — R35.0 FREQUENCY OF URINATION: ICD-10-CM

## 2025-08-11 DIAGNOSIS — E55.9 VITAMIN D DEFICIENCY, UNSPECIFIED: ICD-10-CM

## 2025-08-11 DIAGNOSIS — E78.5 DYSLIPIDEMIA (HIGH LDL; LOW HDL): ICD-10-CM

## 2025-08-11 DIAGNOSIS — M16.11 OSTEOARTHRITIS OF RIGHT HIP, UNSPECIFIED OSTEOARTHRITIS TYPE: ICD-10-CM

## 2025-08-11 DIAGNOSIS — M19.012 ARTHRITIS OF LEFT SHOULDER REGION: ICD-10-CM

## 2025-08-11 DIAGNOSIS — E11.21 TYPE 2 DIABETES MELLITUS WITH NEPHROPATHY (HCC): ICD-10-CM

## 2025-08-11 DIAGNOSIS — E03.9 ACQUIRED HYPOTHYROIDISM: ICD-10-CM

## 2025-08-11 DIAGNOSIS — I10 ESSENTIAL (PRIMARY) HYPERTENSION: Primary | ICD-10-CM

## 2025-08-11 DIAGNOSIS — I10 ESSENTIAL (PRIMARY) HYPERTENSION: ICD-10-CM

## 2025-08-11 LAB
25(OH)D3 SERPL-MCNC: 52.4 NG/ML (ref 30–100)
ALBUMIN SERPL-MCNC: 3.8 G/DL (ref 3.5–5.2)
ALBUMIN/GLOB SERPL: 1.4 (ref 1.1–2.2)
ALP SERPL-CCNC: 94 U/L (ref 35–104)
ALT SERPL-CCNC: 23 U/L (ref 10–35)
ANION GAP SERPL CALC-SCNC: 18 MMOL/L (ref 2–14)
APPEARANCE UR: CLEAR
AST SERPL-CCNC: 29 U/L (ref 10–35)
BACTERIA URNS QL MICRO: NEGATIVE /HPF
BASOPHILS # BLD: 0.03 K/UL (ref 0–0.1)
BASOPHILS NFR BLD: 0.4 % (ref 0–1)
BILIRUB SERPL-MCNC: 0.9 MG/DL (ref 0–1.2)
BILIRUB UR QL: NEGATIVE
BUN SERPL-MCNC: 22 MG/DL (ref 8–23)
CALCIUM SERPL-MCNC: 9.9 MG/DL (ref 8.8–10.2)
CHLORIDE SERPL-SCNC: 100 MMOL/L (ref 98–107)
CHOLEST SERPL-MCNC: 174 MG/DL (ref 0–200)
CO2 SERPL-SCNC: 22 MMOL/L (ref 20–29)
COLOR UR: ABNORMAL
CREAT SERPL-MCNC: 1.32 MG/DL (ref 0.6–1)
DIFFERENTIAL METHOD BLD: NORMAL
EOSINOPHIL # BLD: 0.02 K/UL (ref 0–0.4)
EOSINOPHIL NFR BLD: 0.3 % (ref 0–7)
EPITH CASTS URNS QL MICRO: ABNORMAL /LPF
ERYTHROCYTE [DISTWIDTH] IN BLOOD BY AUTOMATED COUNT: 13.9 % (ref 11.5–14.5)
GLOBULIN SER CALC-MCNC: 2.8 G/DL (ref 2–4)
GLUCOSE SERPL-MCNC: 234 MG/DL (ref 65–100)
GLUCOSE UR STRIP.AUTO-MCNC: 100 MG/DL
HCT VFR BLD AUTO: 41 % (ref 35–47)
HDLC SERPL-MCNC: 54 MG/DL (ref 40–60)
HDLC SERPL: 3.2 (ref 0–5)
HGB BLD-MCNC: 13.6 G/DL (ref 11.5–16)
HGB UR QL STRIP: NEGATIVE
HYALINE CASTS URNS QL MICRO: ABNORMAL /LPF (ref 0–5)
IMM GRANULOCYTES # BLD AUTO: 0.02 K/UL (ref 0–0.04)
IMM GRANULOCYTES NFR BLD AUTO: 0.3 % (ref 0–0.5)
KETONES UR QL STRIP.AUTO: NEGATIVE MG/DL
LDLC SERPL CALC-MCNC: 102 MG/DL (ref 0–100)
LEUKOCYTE ESTERASE UR QL STRIP.AUTO: ABNORMAL
LYMPHOCYTES # BLD: 2.54 K/UL (ref 0.8–3.5)
LYMPHOCYTES NFR BLD: 35.2 % (ref 12–49)
MCH RBC QN AUTO: 29.1 PG (ref 26–34)
MCHC RBC AUTO-ENTMCNC: 33.2 G/DL (ref 30–36.5)
MCV RBC AUTO: 87.6 FL (ref 80–99)
MONOCYTES # BLD: 0.55 K/UL (ref 0–1)
MONOCYTES NFR BLD: 7.6 % (ref 5–13)
NEUTS SEG # BLD: 4.06 K/UL (ref 1.8–8)
NEUTS SEG NFR BLD: 56.2 % (ref 32–75)
NITRITE UR QL STRIP.AUTO: NEGATIVE
NRBC # BLD: 0 K/UL (ref 0–0.01)
NRBC BLD-RTO: 0 PER 100 WBC
PH UR STRIP: 7 (ref 5–8)
PLATELET # BLD AUTO: 239 K/UL (ref 150–400)
PMV BLD AUTO: 10.4 FL (ref 8.9–12.9)
POTASSIUM SERPL-SCNC: 4.1 MMOL/L (ref 3.5–5.1)
PROT SERPL-MCNC: 6.6 G/DL (ref 6.4–8.3)
PROT UR STRIP-MCNC: NEGATIVE MG/DL
RBC # BLD AUTO: 4.68 M/UL (ref 3.8–5.2)
RBC #/AREA URNS HPF: ABNORMAL /HPF (ref 0–5)
SODIUM SERPL-SCNC: 140 MMOL/L (ref 136–145)
SP GR UR REFRACTOMETRY: 1.01 (ref 1–1.03)
TRIGL SERPL-MCNC: 89 MG/DL (ref 0–150)
TSH, 3RD GENERATION: 0.59 UIU/ML (ref 0.27–4.2)
URINE CULTURE IF INDICATED: ABNORMAL
UROBILINOGEN UR QL STRIP.AUTO: 0.2 EU/DL (ref 0.2–1)
VLDLC SERPL CALC-MCNC: 18 MG/DL
WBC # BLD AUTO: 7.2 K/UL (ref 3.6–11)
WBC URNS QL MICRO: ABNORMAL /HPF (ref 0–4)

## 2025-08-11 PROCEDURE — 99214 OFFICE O/P EST MOD 30 MIN: CPT | Performed by: INTERNAL MEDICINE

## 2025-08-11 PROCEDURE — 3078F DIAST BP <80 MM HG: CPT | Performed by: INTERNAL MEDICINE

## 2025-08-11 PROCEDURE — 3074F SYST BP LT 130 MM HG: CPT | Performed by: INTERNAL MEDICINE

## 2025-08-11 PROCEDURE — 3044F HG A1C LEVEL LT 7.0%: CPT | Performed by: INTERNAL MEDICINE

## 2025-08-11 PROCEDURE — 1123F ACP DISCUSS/DSCN MKR DOCD: CPT | Performed by: INTERNAL MEDICINE

## 2025-08-11 RX ORDER — AMLODIPINE BESYLATE 5 MG/1
5 TABLET ORAL DAILY
Qty: 90 TABLET | Refills: 0 | Status: SHIPPED | OUTPATIENT
Start: 2025-08-11

## 2025-08-11 RX ORDER — SPIRONOLACTONE 25 MG/1
12.5 TABLET ORAL DAILY
Qty: 45 TABLET | Refills: 1 | Status: SHIPPED | OUTPATIENT
Start: 2025-08-11

## (undated) DEVICE — SUTURE MCRYL SZ 3-0 L27IN ABSRB UD L24MM PS-1 3/8 CIR PRIM Y936H

## (undated) DEVICE — ELECTRODE PT RET AD L9FT HI MOIST COND ADH HYDRGEL CORDED

## (undated) DEVICE — APPLICATOR MEDICATED 26 CC SOLUTION HI LT ORNG CHLORAPREP

## (undated) DEVICE — BLADE SAW W11.2XL77.5MM THK0.76MM CUT THK1.17MM REPL RECIP

## (undated) DEVICE — BLADE ELECTRODE: Brand: EDGE

## (undated) DEVICE — SUTURE VCRL SZ 2-0 L36IN ABSRB UD L40MM CT 1/2 CIR J957H

## (undated) DEVICE — PIN DRL 4X125 MM ROBOTIC-ASSISTED SOLUTION ARRY VELYS

## (undated) DEVICE — 6619 2 PTNT ISO SYS INCISE AREA&LT;(&GT;&&LT;)&GT;P: Brand: STERI-DRAPE™ IOBAN™ 2

## (undated) DEVICE — PADDING CAST W6INXL4YD ST COT RAYON MICROPLEATED HIGHLY

## (undated) DEVICE — HYPODERMIC SAFETY NEEDLE: Brand: MAGELLAN

## (undated) DEVICE — DRSG POSTOP PRMSL AG 3.5X14IN

## (undated) DEVICE — ZIMMER® STERILE DISPOSABLE TOURNIQUET CUFF WITH PLC, DUAL PORT, SINGLE BLADDER, 34 IN. (86 CM)

## (undated) DEVICE — SUTURE STRATAFIX SYMMETRIC PDS + SZ 1 L18IN ABSRB VLT L48MM SXPP1A400

## (undated) DEVICE — SUTURE ETHBND EXCEL SZ 2 L30IN NONABSORBABLE GRN L40MM V-37 MX69G

## (undated) DEVICE — SOLUTION SURG PREP 26 CC PURPREP

## (undated) DEVICE — COVER,MAYO STAND,STERILE: Brand: MEDLINE

## (undated) DEVICE — DERMABOND SKIN ADH 0.7ML -- DERMABOND ADVANCED 12/BX

## (undated) DEVICE — DRAPE EQUIP ROBOT STRL VELYS 20/PK ORDER IN MULTIIPLES OF 20 EACH

## (undated) DEVICE — GOWN,SIRUS,NONRNF,SETINSLV,2XL,18/CS: Brand: MEDLINE

## (undated) DEVICE — PADDING CAST W6INXL4YD NONSTERILE COT RAYON MICROPLEATED

## (undated) DEVICE — SPONGE GZ W4XL4IN COT 12 PLY TYP VII WVN C FLD DSGN STERILE

## (undated) DEVICE — BLADE SAW W0.49XL3.15IN THK0.047IN CUT THK0.047IN REPL SAG

## (undated) DEVICE — SUTURE ABS MF 2-0 CT1 27IN STRATAFIX PDS+ SXPP1B412

## (undated) DEVICE — SUTURE MONOCRYL STRATAFIX SPRL + 3 0 SGL ARMED PS 1 24 IN LEN SXMP1B103

## (undated) DEVICE — CONTAINER,SPECIMEN,3OZ,OR STRL: Brand: MEDLINE

## (undated) DEVICE — SUTURE VICRYL 1 L27IN ABSRB CT BRAID COAT UD J281H

## (undated) DEVICE — Device

## (undated) DEVICE — GLOVE SURG SZ 65 L12IN FNGR THK94MIL STD WHT LTX FREE

## (undated) DEVICE — GLOVE SURG SZ 8 L12IN FNGR THK94MIL STD WHT LTX FREE

## (undated) DEVICE — SOLUTION IRRIG 3000ML 0.9% SOD CHL USP UROMATIC PLAS CONT

## (undated) DEVICE — 3M™ STERI-DRAPE™ U-DRAPE 1015: Brand: STERI-DRAPE™

## (undated) DEVICE — SUTURE VCRL 1 L27IN ABSRB CT BRAID COAT UD J281H

## (undated) DEVICE — TOTAL JOINT - SMH: Brand: MEDLINE INDUSTRIES, INC.

## (undated) DEVICE — BANDAGE COMPR M W6INXL10YD WHT BGE VELC E MTRX HK AND LOOP

## (undated) DEVICE — PADDING CAST SPEC 6INX4YD COT --

## (undated) DEVICE — DRAPE,EXTREMITY,89X128,STERILE: Brand: MEDLINE

## (undated) DEVICE — GLOVE ORTHO 8   MSG9480

## (undated) DEVICE — SYRINGE 20ML LL S/C 50

## (undated) DEVICE — HOOD, PEEL-AWAY: Brand: FLYTE

## (undated) DEVICE — YANKAUER,FLEXIBLE HANDLE,REGLR CAPACITY: Brand: MEDLINE INDUSTRIES, INC.

## (undated) DEVICE — DRAPE EQUIP SATELLITE STN STRL VELYS

## (undated) DEVICE — BLADE SAW OSCILLATING 85X19X2 MM ROBOTIC-ASSISTED VELYS

## (undated) DEVICE — CONTAINER,SPECIMEN,4OZ,OR STRL: Brand: MEDLINE

## (undated) DEVICE — TRANSFER SET 3": Brand: MEDLINE INDUSTRIES, INC.

## (undated) DEVICE — DRESSING FOAM 4X8IN DISP POSTOP MEPILEX BORD AG

## (undated) DEVICE — PREP SKN CHLRAPRP APL 26ML STR --

## (undated) DEVICE — REM POLYHESIVE ADULT PATIENT RETURN ELECTRODE: Brand: VALLEYLAB

## (undated) DEVICE — SCRUB DRY SURG EZ SCRUB BRUSH PREOPERATIVE GRN

## (undated) DEVICE — BLADE ES L6IN ELASTOMERIC COAT EXT DURABLE BEND UPTO 90DEG

## (undated) DEVICE — DRESSING HYDROCOLLOID BORDER 35X10 IN ALUM PRIMASEAL

## (undated) DEVICE — GLOVE SURG SZ 65 L12IN FNGR THK79MIL GRN LTX FREE

## (undated) DEVICE — 2108 SERIES SAGITTAL BLADE, NO OFFSET  (12.4 X 1.19 X 82.1MM)

## (undated) DEVICE — HANDPIECE SET WITH BONE CLEANING TIP AND SUCTION TUBE: Brand: INTERPULSE

## (undated) DEVICE — 4-PORT MANIFOLD: Brand: NEPTUNE 2

## (undated) DEVICE — T4 HOOD

## (undated) DEVICE — LIQUIBAND RAPID ADHESIVE 36/CS 0.8ML: Brand: MEDLINE

## (undated) DEVICE — C-ARM: Brand: UNBRANDED

## (undated) DEVICE — TUBING SUCT 12FR MAL ALUM SHFT FN CAP VENT UNIV CONN W/ OBT

## (undated) DEVICE — BANDAGE COMPR W6INXL12FT SMOOTH FOR LIMB EXSANG ESMARCH

## (undated) DEVICE — Z INACTIVE USE 2854097 SPONGE GZ W4XL4IN COT 12 PLY TYP VII WVN C FLD DSGN

## (undated) DEVICE — SCRUBIN SCRUB BRUSH DRY STER: Brand: MEDLINE INDUSTRIES, INC.

## (undated) DEVICE — SOLUTION WND IRRIGATION 450 ML 0.5 PVP-I 0.9 NACL